# Patient Record
Sex: FEMALE | Race: WHITE | Employment: UNEMPLOYED | URBAN - METROPOLITAN AREA
[De-identification: names, ages, dates, MRNs, and addresses within clinical notes are randomized per-mention and may not be internally consistent; named-entity substitution may affect disease eponyms.]

---

## 2018-10-22 ENCOUNTER — APPOINTMENT (OUTPATIENT)
Dept: CT IMAGING | Age: 33
End: 2018-10-22
Payer: COMMERCIAL

## 2018-10-22 ENCOUNTER — HOSPITAL ENCOUNTER (EMERGENCY)
Age: 33
Discharge: HOME | End: 2018-10-22
Attending: EMERGENCY MEDICINE
Payer: COMMERCIAL

## 2018-10-22 VITALS
HEIGHT: 68 IN | BODY MASS INDEX: 27.28 KG/M2 | TEMPERATURE: 97.8 F | WEIGHT: 180 LBS | DIASTOLIC BLOOD PRESSURE: 88 MMHG | OXYGEN SATURATION: 97 % | HEART RATE: 78 BPM | SYSTOLIC BLOOD PRESSURE: 145 MMHG | RESPIRATION RATE: 20 BRPM

## 2018-10-22 DIAGNOSIS — S00.33XA HEMATOMA OF NASAL SEPTUM, INITIAL ENCOUNTER: ICD-10-CM

## 2018-10-22 DIAGNOSIS — S09.90XA CLOSED HEAD INJURY, INITIAL ENCOUNTER: Primary | ICD-10-CM

## 2018-10-22 PROCEDURE — 6370000000 HC RX 637 (ALT 250 FOR IP): Performed by: EMERGENCY MEDICINE

## 2018-10-22 PROCEDURE — 6370000000 HC RX 637 (ALT 250 FOR IP)

## 2018-10-22 PROCEDURE — 72125 CT NECK SPINE W/O DYE: CPT

## 2018-10-22 PROCEDURE — 99284 EMERGENCY DEPT VISIT MOD MDM: CPT

## 2018-10-22 PROCEDURE — 70486 CT MAXILLOFACIAL W/O DYE: CPT

## 2018-10-22 PROCEDURE — 70450 CT HEAD/BRAIN W/O DYE: CPT

## 2018-10-22 RX ORDER — OXYCODONE HYDROCHLORIDE AND ACETAMINOPHEN 5; 325 MG/1; MG/1
1 TABLET ORAL ONCE
Status: COMPLETED | OUTPATIENT
Start: 2018-10-22 | End: 2018-10-22

## 2018-10-22 RX ORDER — HYDROCODONE BITARTRATE AND ACETAMINOPHEN 5; 325 MG/1; MG/1
1 TABLET ORAL ONCE
Status: COMPLETED | OUTPATIENT
Start: 2018-10-22 | End: 2018-10-22

## 2018-10-22 RX ORDER — HYDROCODONE BITARTRATE AND ACETAMINOPHEN 5; 325 MG/1; MG/1
TABLET ORAL
Status: COMPLETED
Start: 2018-10-22 | End: 2018-10-22

## 2018-10-22 RX ORDER — HYDROCODONE BITARTRATE AND ACETAMINOPHEN 5; 325 MG/1; MG/1
1 TABLET ORAL EVERY 6 HOURS PRN
Qty: 8 TABLET | Refills: 0 | Status: SHIPPED | OUTPATIENT
Start: 2018-10-22 | End: 2018-10-24

## 2018-10-22 RX ADMIN — HYDROCODONE BITARTRATE AND ACETAMINOPHEN 1 TABLET: 5; 325 TABLET ORAL at 21:00

## 2018-10-22 RX ADMIN — OXYCODONE HYDROCHLORIDE AND ACETAMINOPHEN 1 TABLET: 5; 325 TABLET ORAL at 18:14

## 2018-10-22 ASSESSMENT — PAIN SCALES - GENERAL
PAINLEVEL_OUTOF10: 8
PAINLEVEL_OUTOF10: 8

## 2018-10-22 ASSESSMENT — PAIN DESCRIPTION - PAIN TYPE: TYPE: ACUTE PAIN

## 2018-10-22 ASSESSMENT — PAIN DESCRIPTION - LOCATION: LOCATION: FACE;HEAD

## 2018-10-22 NOTE — ED PROVIDER NOTES
Straightening of the normal cervical lordosis which may be   secondary to patient positioning versus muscle spasm. Recommend   clinical correlation. CT FACIAL BONES WO CONTRAST   Final Result      1. No definite evidence of acute facial fracture or related   complication. 2. Fluid levels and mucosal thickening in the maxillary sinuses and   less prominent mucosal thickening in ethmoid air cells may be related   to acute sinusitis. 3. There is a left-sided nasal septal spur in the mid nasal cavity. 4. The patient is edentulous      CT Head WO Contrast   Final Result   Negative for evidence of acute intracranial pathology or calvarial   fracture. There is soft tissue swelling over the nasal bones and midline for   head externally, and there is mucosal thickening or dependent fluid in   the maxillary sinuses. ------------------------- NURSING NOTES AND VITALS REVIEWED ---------------------------   The nursing notes within the ED encounter and vital signs as below have been reviewed. BP (!) 145/88   Pulse 78   Temp 97.8 °F (36.6 °C)   Resp 20   Ht 5' 8\" (1.727 m)   Wt 180 lb (81.6 kg)   SpO2 97%   BMI 27.37 kg/m²   Oxygen Saturation Interpretation: Normal      ---------------------------------------------------PHYSICAL EXAM--------------------------------------      Constitutional/General: Alert and oriented x3, well appearing, non toxic in NAD  Head: Normocephalic and atraumatic. No midface instability. Eyes: PERRL, EOMI  Mouth: Oropharynx clear, handling secretions, no trismus  Nose:  No nasal septal hematoma. Neck: Supple, midline ttp. Collar in place. Pulmonary: Lungs clear to auscultation bilaterally, no wheezes, rales, or rhonchi. Not in respiratory distress  Cardiovascular:  Regular rate and rhythm, no murmurs, gallops, or rubs. 2+ distal pulses  Abdomen: Soft, non tender, non distended,   Extremities: Moves all extremities x 4. Warm and well perfused.  Nontender  Skin: warm

## 2019-06-17 ENCOUNTER — APPOINTMENT (OUTPATIENT)
Dept: CT IMAGING | Age: 34
End: 2019-06-17
Payer: COMMERCIAL

## 2019-06-17 ENCOUNTER — HOSPITAL ENCOUNTER (EMERGENCY)
Age: 34
Discharge: HOME | End: 2019-06-18
Attending: EMERGENCY MEDICINE
Payer: COMMERCIAL

## 2019-06-17 DIAGNOSIS — K52.9 GASTROENTERITIS: Primary | ICD-10-CM

## 2019-06-17 DIAGNOSIS — K76.0 FATTY LIVER: ICD-10-CM

## 2019-06-17 DIAGNOSIS — K80.20 CALCULUS OF GALLBLADDER WITHOUT CHOLECYSTITIS WITHOUT OBSTRUCTION: ICD-10-CM

## 2019-06-17 LAB
ALBUMIN SERPL-MCNC: 4.5 G/DL (ref 3.5–5.2)
ALP BLD-CCNC: 73 U/L (ref 35–104)
ALT SERPL-CCNC: 13 U/L (ref 0–32)
ANION GAP SERPL CALCULATED.3IONS-SCNC: 15 MMOL/L (ref 7–16)
AST SERPL-CCNC: 11 U/L (ref 0–31)
BACTERIA: ABNORMAL /HPF
BASOPHILS ABSOLUTE: 0.04 E9/L (ref 0–0.2)
BASOPHILS RELATIVE PERCENT: 0.2 % (ref 0–2)
BILIRUB SERPL-MCNC: 0.4 MG/DL (ref 0–1.2)
BILIRUBIN URINE: ABNORMAL
BLOOD, URINE: ABNORMAL
BUN BLDV-MCNC: 12 MG/DL (ref 6–20)
CALCIUM SERPL-MCNC: 9.6 MG/DL (ref 8.6–10.2)
CHLORIDE BLD-SCNC: 111 MMOL/L (ref 98–107)
CLARITY: ABNORMAL
CO2: 17 MMOL/L (ref 22–29)
COLOR: ABNORMAL
CREAT SERPL-MCNC: 0.7 MG/DL (ref 0.5–1)
EOSINOPHILS ABSOLUTE: 0 E9/L (ref 0.05–0.5)
EOSINOPHILS RELATIVE PERCENT: 0 % (ref 0–6)
GFR AFRICAN AMERICAN: >60
GFR NON-AFRICAN AMERICAN: >60 ML/MIN/1.73
GLUCOSE BLD-MCNC: 116 MG/DL (ref 74–99)
GLUCOSE URINE: NEGATIVE MG/DL
HCG, URINE, POC: NEGATIVE
HCT VFR BLD CALC: 45 % (ref 34–48)
HEMOGLOBIN: 15.1 G/DL (ref 11.5–15.5)
IMMATURE GRANULOCYTES #: 0.13 E9/L
IMMATURE GRANULOCYTES %: 0.8 % (ref 0–5)
KETONES, URINE: 40 MG/DL
LACTIC ACID: 2.8 MMOL/L (ref 0.5–2.2)
LEUKOCYTE ESTERASE, URINE: NEGATIVE
LYMPHOCYTES ABSOLUTE: 1.54 E9/L (ref 1.5–4)
LYMPHOCYTES RELATIVE PERCENT: 9 % (ref 20–42)
Lab: NORMAL
MCH RBC QN AUTO: 30.3 PG (ref 26–35)
MCHC RBC AUTO-ENTMCNC: 33.6 % (ref 32–34.5)
MCV RBC AUTO: 90.4 FL (ref 80–99.9)
MONOCYTES ABSOLUTE: 0.73 E9/L (ref 0.1–0.95)
MONOCYTES RELATIVE PERCENT: 4.3 % (ref 2–12)
MUCUS: PRESENT
NEGATIVE QC PASS/FAIL: NORMAL
NEUTROPHILS ABSOLUTE: 14.68 E9/L (ref 1.8–7.3)
NEUTROPHILS RELATIVE PERCENT: 85.7 % (ref 43–80)
NITRITE, URINE: NEGATIVE
PDW BLD-RTO: 13.5 FL (ref 11.5–15)
PH UA: 8 (ref 5–9)
PLATELET # BLD: 238 E9/L (ref 130–450)
PMV BLD AUTO: 11.3 FL (ref 7–12)
POSITIVE QC PASS/FAIL: NORMAL
POTASSIUM SERPL-SCNC: 3.8 MMOL/L (ref 3.5–5)
PROTEIN UA: 100 MG/DL
RBC # BLD: 4.98 E12/L (ref 3.5–5.5)
RBC UA: ABNORMAL /HPF (ref 0–2)
REASON FOR REJECTION: NORMAL
REJECTED TEST: NORMAL
SODIUM BLD-SCNC: 143 MMOL/L (ref 132–146)
SPECIFIC GRAVITY UA: 1.01 (ref 1–1.03)
TOTAL PROTEIN: 7.2 G/DL (ref 6.4–8.3)
UROBILINOGEN, URINE: 2 E.U./DL
WBC # BLD: 17.1 E9/L (ref 4.5–11.5)
WBC UA: ABNORMAL /HPF (ref 0–5)

## 2019-06-17 PROCEDURE — 96372 THER/PROPH/DIAG INJ SC/IM: CPT

## 2019-06-17 PROCEDURE — 85025 COMPLETE CBC W/AUTO DIFF WBC: CPT

## 2019-06-17 PROCEDURE — 99284 EMERGENCY DEPT VISIT MOD MDM: CPT

## 2019-06-17 PROCEDURE — 6360000002 HC RX W HCPCS: Performed by: NURSE PRACTITIONER

## 2019-06-17 PROCEDURE — 93005 ELECTROCARDIOGRAM TRACING: CPT | Performed by: EMERGENCY MEDICINE

## 2019-06-17 PROCEDURE — 83605 ASSAY OF LACTIC ACID: CPT

## 2019-06-17 PROCEDURE — 96375 TX/PRO/DX INJ NEW DRUG ADDON: CPT

## 2019-06-17 PROCEDURE — 81001 URINALYSIS AUTO W/SCOPE: CPT

## 2019-06-17 PROCEDURE — 2580000003 HC RX 258: Performed by: RADIOLOGY

## 2019-06-17 PROCEDURE — 2580000003 HC RX 258: Performed by: NURSE PRACTITIONER

## 2019-06-17 PROCEDURE — 36415 COLL VENOUS BLD VENIPUNCTURE: CPT

## 2019-06-17 PROCEDURE — 74177 CT ABD & PELVIS W/CONTRAST: CPT

## 2019-06-17 PROCEDURE — 6360000004 HC RX CONTRAST MEDICATION: Performed by: RADIOLOGY

## 2019-06-17 PROCEDURE — 96374 THER/PROPH/DIAG INJ IV PUSH: CPT

## 2019-06-17 PROCEDURE — 80053 COMPREHEN METABOLIC PANEL: CPT

## 2019-06-17 RX ORDER — ONDANSETRON 2 MG/ML
4 INJECTION INTRAMUSCULAR; INTRAVENOUS ONCE
Status: COMPLETED | OUTPATIENT
Start: 2019-06-17 | End: 2019-06-17

## 2019-06-17 RX ORDER — 0.9 % SODIUM CHLORIDE 0.9 %
1000 INTRAVENOUS SOLUTION INTRAVENOUS ONCE
Status: COMPLETED | OUTPATIENT
Start: 2019-06-17 | End: 2019-06-18

## 2019-06-17 RX ORDER — PROMETHAZINE HYDROCHLORIDE 25 MG/ML
12.5 INJECTION, SOLUTION INTRAMUSCULAR; INTRAVENOUS ONCE
Status: COMPLETED | OUTPATIENT
Start: 2019-06-17 | End: 2019-06-17

## 2019-06-17 RX ORDER — SODIUM CHLORIDE 0.9 % (FLUSH) 0.9 %
10 SYRINGE (ML) INJECTION ONCE
Status: COMPLETED | OUTPATIENT
Start: 2019-06-17 | End: 2019-06-17

## 2019-06-17 RX ORDER — MORPHINE SULFATE 2 MG/ML
2 INJECTION, SOLUTION INTRAMUSCULAR; INTRAVENOUS ONCE
Status: COMPLETED | OUTPATIENT
Start: 2019-06-17 | End: 2019-06-17

## 2019-06-17 RX ORDER — KETOROLAC TROMETHAMINE 30 MG/ML
15 INJECTION, SOLUTION INTRAMUSCULAR; INTRAVENOUS ONCE
Status: COMPLETED | OUTPATIENT
Start: 2019-06-17 | End: 2019-06-17

## 2019-06-17 RX ORDER — 0.9 % SODIUM CHLORIDE 0.9 %
1000 INTRAVENOUS SOLUTION INTRAVENOUS ONCE
Status: COMPLETED | OUTPATIENT
Start: 2019-06-17 | End: 2019-06-17

## 2019-06-17 RX ORDER — PROCHLORPERAZINE EDISYLATE 5 MG/ML
10 INJECTION INTRAMUSCULAR; INTRAVENOUS ONCE
Status: COMPLETED | OUTPATIENT
Start: 2019-06-17 | End: 2019-06-17

## 2019-06-17 RX ADMIN — SODIUM CHLORIDE 1000 ML: 9 INJECTION, SOLUTION INTRAVENOUS at 23:00

## 2019-06-17 RX ADMIN — Medication 10 ML: at 21:52

## 2019-06-17 RX ADMIN — PROCHLORPERAZINE EDISYLATE 10 MG: 5 INJECTION INTRAMUSCULAR; INTRAVENOUS at 23:00

## 2019-06-17 RX ADMIN — ONDANSETRON 4 MG: 2 INJECTION INTRAMUSCULAR; INTRAVENOUS at 19:29

## 2019-06-17 RX ADMIN — IOPAMIDOL 110 ML: 755 INJECTION, SOLUTION INTRAVENOUS at 21:52

## 2019-06-17 RX ADMIN — KETOROLAC TROMETHAMINE 15 MG: 30 INJECTION, SOLUTION INTRAMUSCULAR at 19:29

## 2019-06-17 RX ADMIN — MORPHINE SULFATE 2 MG: 2 INJECTION, SOLUTION INTRAMUSCULAR; INTRAVENOUS at 20:16

## 2019-06-17 RX ADMIN — SODIUM CHLORIDE 1000 ML: 9 INJECTION, SOLUTION INTRAVENOUS at 19:29

## 2019-06-17 RX ADMIN — PROMETHAZINE HYDROCHLORIDE 12.5 MG: 25 INJECTION INTRAMUSCULAR; INTRAVENOUS at 21:10

## 2019-06-17 ASSESSMENT — PAIN SCALES - GENERAL
PAINLEVEL_OUTOF10: 8
PAINLEVEL_OUTOF10: 10
PAINLEVEL_OUTOF10: 8

## 2019-06-17 ASSESSMENT — PAIN DESCRIPTION - PAIN TYPE: TYPE: ACUTE PAIN

## 2019-06-17 ASSESSMENT — PAIN DESCRIPTION - LOCATION: LOCATION: ABDOMEN

## 2019-06-17 NOTE — ED PROVIDER NOTES
Color, UA SILVESTRE (A) Straw/Yellow    Clarity, UA SL CLOUDY Clear    Glucose, Ur Negative Negative mg/dL    Bilirubin Urine SMALL (A) Negative    Ketones, Urine 40 (A) Negative mg/dL    Specific Gravity, UA 1.015 1.005 - 1.030    Blood, Urine LARGE (A) Negative    pH, UA 8.0 5.0 - 9.0    Protein,  (A) Negative mg/dL    Urobilinogen, Urine 2.0 (A) <2.0 E.U./dL    Nitrite, Urine Negative Negative    Leukocyte Esterase, Urine Negative Negative   Lactic Acid, Plasma   Result Value Ref Range    Lactic Acid 2.8 (H) 0.5 - 2.2 mmol/L   Microscopic Urinalysis   Result Value Ref Range    Mucus, UA Present     WBC, UA 1-3 0 - 5 /HPF    RBC, UA 2-5 0 - 2 /HPF    Bacteria, UA MODERATE (A) /HPF   SPECIMEN REJECTION   Result Value Ref Range    Rejected Test CMP     Reason for Rejection see below    Comprehensive Metabolic Panel   Result Value Ref Range    Sodium 143 132 - 146 mmol/L    Potassium 3.8 3.5 - 5.0 mmol/L    Chloride 111 (H) 98 - 107 mmol/L    CO2 17 (L) 22 - 29 mmol/L    Anion Gap 15 7 - 16 mmol/L    Glucose 116 (H) 74 - 99 mg/dL    BUN 12 6 - 20 mg/dL    CREATININE 0.7 0.5 - 1.0 mg/dL    GFR Non-African American >60 >=60 mL/min/1.73    GFR African American >60     Calcium 9.6 8.6 - 10.2 mg/dL    Total Protein 7.2 6.4 - 8.3 g/dL    Alb 4.5 3.5 - 5.2 g/dL    Total Bilirubin 0.4 0.0 - 1.2 mg/dL    Alkaline Phosphatase 73 35 - 104 U/L    ALT 13 0 - 32 U/L    AST 11 0 - 31 U/L   POC Pregnancy Urine   Result Value Ref Range    HCG, Urine, POC Negative Negative    Lot Number [id number]     Positive QC Pass/Fail Pass     Negative QC Pass/Fail Pass    EKG 12 Lead   Result Value Ref Range    Ventricular Rate 51 BPM    Atrial Rate 51 BPM    P-R Interval 138 ms    QRS Duration 98 ms    Q-T Interval 498 ms    QTc Calculation (Bazett) 458 ms    P Axis 55 degrees    R Axis 80 degrees    T Axis 90 degrees     Imaging: All Radiology results interpreted by Radiologist unless otherwise noted.   CT ABDOMEN PELVIS W IV CONTRAST Additional Contrast? None   Final Result   Mild fatty liver. Cholelithiasis. See above. ED Course / Medical Decision Making     Medications   [facility]ansetron [facility]) injection 4 mg (4 mg Intravenous Given 6/17/19 1929)   ketorolac (TORADOL) injection 15 mg (15 mg Intravenous Given 6/17/19 1929)   0.9 % sodium chloride bolus (0 mLs Intravenous Stopped 6/17/19 2300)   morphine (PF) injection 2 mg (2 mg Intravenous Given 6/17/19 2016)   iopamidol (ISOVUE-370) 76 % injection 110 mL (110 mLs Intravenous Given 6/17/19 2152)   sodium chloride flush 0.9 % injection 10 mL (10 mLs Intravenous Given 6/17/19 2152)   promethazine (PHENERGAN) injection 12.5 mg (12.5 mg Intramuscular Given 6/17/19 2110)   0.9 % sodium chloride bolus (0 mLs Intravenous Stopped 6/18/19 0127)   prochlorperazine (COMPAZINE) injection 10 mg (10 mg Intravenous Given 6/17/19 2300)     ED Course as of Jun 18 1937   Mon Jun 17, 2019   2239 Patient appears uncomfortable but nontoxic. Mild diffuse abdominal tenderness but no rebound or guarding. No focal tenderness. [JA]   Tue Jun 18, 2019   0103 On re-evaluation, patient has benign abdomen without focal tenderness. She is now tolerating po. leukocytosis and mild lactic acidosis likely reactive from gastroenteritis. Discussed incidental findings of gallstones and fatty liver. Advised outpatient follow-up. Patient has no right upper quadrant tenderness to suggest acute cholecystitis. LFTs normal.  Will be treated symptomatically with Zofran. ED return precautions discussed. [JA]      ED Course User Index  [JA] Esther Lucas MD     Re-examination:  6/17/19       Time:     Patients symptoms are improving. Consults:   None    Procedures:   none    MDM:   Patient is a 36year old female who came to the ED with complaints of lower abdominal pain, and emesis since this am. Her WBC were 17.1, and lactic acid was 2.8.  The CT of her abdomen and pelvis showed a mildly fatty

## 2019-06-18 VITALS
TEMPERATURE: 97.2 F | HEIGHT: 65 IN | RESPIRATION RATE: 16 BRPM | OXYGEN SATURATION: 100 % | DIASTOLIC BLOOD PRESSURE: 80 MMHG | BODY MASS INDEX: 29.99 KG/M2 | WEIGHT: 180 LBS | HEART RATE: 70 BPM | SYSTOLIC BLOOD PRESSURE: 128 MMHG

## 2019-06-18 LAB
EKG ATRIAL RATE: 51 BPM
EKG P AXIS: 55 DEGREES
EKG P-R INTERVAL: 138 MS
EKG Q-T INTERVAL: 498 MS
EKG QRS DURATION: 98 MS
EKG QTC CALCULATION (BAZETT): 458 MS
EKG R AXIS: 80 DEGREES
EKG T AXIS: 90 DEGREES
EKG VENTRICULAR RATE: 51 BPM

## 2019-06-18 PROCEDURE — 93010 ELECTROCARDIOGRAM REPORT: CPT | Performed by: INTERNAL MEDICINE

## 2019-06-18 RX ORDER — ONDANSETRON 4 MG/1
4 TABLET, ORALLY DISINTEGRATING ORAL EVERY 8 HOURS PRN
Qty: 12 TABLET | Refills: 0 | Status: SHIPPED | OUTPATIENT
Start: 2019-06-18

## 2019-06-18 NOTE — ED NOTES
Pt alert/oriented x3. resp easy/nonlabored. skin warm/dry. Pt given discharge instructions. Pt verbalized understanding understanding of discharge instruciotns.       [id number] [id number], RN  06/18/19 7733

## 2019-08-16 ENCOUNTER — HOSPITAL ENCOUNTER (EMERGENCY)
Age: 34
Discharge: HOME | End: 2019-08-16
Payer: COMMERCIAL

## 2019-08-16 VITALS
OXYGEN SATURATION: 97 % | TEMPERATURE: 97 F | RESPIRATION RATE: 16 BRPM | HEART RATE: 87 BPM | DIASTOLIC BLOOD PRESSURE: 91 MMHG | SYSTOLIC BLOOD PRESSURE: 142 MMHG

## 2019-08-16 DIAGNOSIS — B07.0 PLANTAR WART: Primary | ICD-10-CM

## 2019-08-16 PROCEDURE — 99282 EMERGENCY DEPT VISIT SF MDM: CPT

## 2019-08-16 RX ORDER — IBUPROFEN 800 MG/1
800 TABLET ORAL EVERY 8 HOURS PRN
Qty: 21 TABLET | Refills: 0 | Status: SHIPPED | OUTPATIENT
Start: 2019-08-16

## 2019-08-16 RX ORDER — CEPHALEXIN 500 MG/1
500 CAPSULE ORAL 3 TIMES DAILY
Qty: 21 CAPSULE | Refills: 0 | Status: SHIPPED | OUTPATIENT
Start: 2019-08-16 | End: 2019-08-23

## 2019-08-19 NOTE — ED PROVIDER NOTES
Electronically signed by NAY Maddox CNP   DD: 8/19/19  **This report was transcribed using voice recognition software. Every effort was made to ensure accuracy; however, inadvertent computerized transcription errors may be present.   END OF ED PROVIDER NOTE     ANY Casillas CNP  08/19/19 [id number]

## 2019-09-03 ENCOUNTER — HOSPITAL ENCOUNTER (EMERGENCY)
Age: 34
Discharge: LEFT BEFORE DISCHARGE | End: 2019-09-03
Payer: COMMERCIAL

## 2019-09-03 VITALS — HEART RATE: 80 BPM | OXYGEN SATURATION: 94 %

## 2021-04-24 ENCOUNTER — APPOINTMENT (OUTPATIENT)
Dept: CT IMAGING | Age: 36
End: 2021-04-24
Payer: COMMERCIAL

## 2021-04-24 ENCOUNTER — HOSPITAL ENCOUNTER (EMERGENCY)
Age: 36
Discharge: LEFT BEFORE DISCHARGE | End: 2021-04-24
Attending: EMERGENCY MEDICINE
Payer: COMMERCIAL

## 2021-04-24 ENCOUNTER — APPOINTMENT (OUTPATIENT)
Dept: ULTRASOUND IMAGING | Age: 36
End: 2021-04-24
Payer: COMMERCIAL

## 2021-04-24 VITALS
OXYGEN SATURATION: 100 % | SYSTOLIC BLOOD PRESSURE: 144 MMHG | HEART RATE: 62 BPM | TEMPERATURE: 96.2 F | DIASTOLIC BLOOD PRESSURE: 91 MMHG | BODY MASS INDEX: 29.33 KG/M2 | RESPIRATION RATE: 16 BRPM | HEIGHT: 69 IN | WEIGHT: 198 LBS

## 2021-04-24 DIAGNOSIS — R10.9 ABDOMINAL PAIN, UNSPECIFIED ABDOMINAL LOCATION: Primary | ICD-10-CM

## 2021-04-24 LAB
ALBUMIN SERPL-MCNC: 4.6 G/DL (ref 3.5–5.2)
ALP BLD-CCNC: 73 U/L (ref 35–104)
ALT SERPL-CCNC: 19 U/L (ref 0–32)
ANION GAP SERPL CALCULATED.3IONS-SCNC: 14 MMOL/L (ref 7–16)
AST SERPL-CCNC: 14 U/L (ref 0–31)
BACTERIA: ABNORMAL /HPF
BASOPHILS ABSOLUTE: 0.04 E9/L (ref 0–0.2)
BASOPHILS RELATIVE PERCENT: 0.2 % (ref 0–2)
BILIRUB SERPL-MCNC: 0.4 MG/DL (ref 0–1.2)
BILIRUBIN URINE: NEGATIVE
BLOOD, URINE: ABNORMAL
BUN BLDV-MCNC: 14 MG/DL (ref 6–20)
CALCIUM SERPL-MCNC: 10 MG/DL (ref 8.6–10.2)
CHLORIDE BLD-SCNC: 102 MMOL/L (ref 98–107)
CLARITY: ABNORMAL
CO2: 21 MMOL/L (ref 22–29)
COLOR: YELLOW
CREAT SERPL-MCNC: 0.8 MG/DL (ref 0.5–1)
EOSINOPHILS ABSOLUTE: 0.02 E9/L (ref 0.05–0.5)
EOSINOPHILS RELATIVE PERCENT: 0.1 % (ref 0–6)
EPITHELIAL CELLS, UA: ABNORMAL /HPF
GFR AFRICAN AMERICAN: >60
GFR NON-AFRICAN AMERICAN: >60 ML/MIN/1.73
GLUCOSE BLD-MCNC: 132 MG/DL (ref 74–99)
GLUCOSE URINE: NEGATIVE MG/DL
HCG, URINE, POC: NEGATIVE
HCT VFR BLD CALC: 41.1 % (ref 34–48)
HEMOGLOBIN: 13.8 G/DL (ref 11.5–15.5)
IMMATURE GRANULOCYTES #: 0.09 E9/L
IMMATURE GRANULOCYTES %: 0.5 % (ref 0–5)
KETONES, URINE: 15 MG/DL
LACTIC ACID: 1.9 MMOL/L (ref 0.5–2.2)
LEUKOCYTE ESTERASE, URINE: ABNORMAL
LYMPHOCYTES ABSOLUTE: 1.32 E9/L (ref 1.5–4)
LYMPHOCYTES RELATIVE PERCENT: 7.5 % (ref 20–42)
Lab: NORMAL
MCH RBC QN AUTO: 30.2 PG (ref 26–35)
MCHC RBC AUTO-ENTMCNC: 33.6 % (ref 32–34.5)
MCV RBC AUTO: 89.9 FL (ref 80–99.9)
MONOCYTES ABSOLUTE: 0.49 E9/L (ref 0.1–0.95)
MONOCYTES RELATIVE PERCENT: 2.8 % (ref 2–12)
NEGATIVE QC PASS/FAIL: NORMAL
NEUTROPHILS ABSOLUTE: 15.73 E9/L (ref 1.8–7.3)
NEUTROPHILS RELATIVE PERCENT: 88.9 % (ref 43–80)
NITRITE, URINE: NEGATIVE
PDW BLD-RTO: 14 FL (ref 11.5–15)
PH UA: 6 (ref 5–9)
PLATELET # BLD: 217 E9/L (ref 130–450)
PMV BLD AUTO: 11 FL (ref 7–12)
POSITIVE QC PASS/FAIL: NORMAL
POTASSIUM SERPL-SCNC: 3.3 MMOL/L (ref 3.5–5)
PROTEIN UA: 30 MG/DL
RBC # BLD: 4.57 E12/L (ref 3.5–5.5)
RBC UA: ABNORMAL /HPF (ref 0–2)
REASON FOR REJECTION: NORMAL
REJECTED TEST: NORMAL
SODIUM BLD-SCNC: 137 MMOL/L (ref 132–146)
SPECIFIC GRAVITY UA: 1.02 (ref 1–1.03)
TOTAL PROTEIN: 7.5 G/DL (ref 6.4–8.3)
UROBILINOGEN, URINE: 0.2 E.U./DL
WBC # BLD: 17.7 E9/L (ref 4.5–11.5)
WBC UA: ABNORMAL /HPF (ref 0–5)

## 2021-04-24 PROCEDURE — 6360000002 HC RX W HCPCS: Performed by: EMERGENCY MEDICINE

## 2021-04-24 PROCEDURE — 80053 COMPREHEN METABOLIC PANEL: CPT

## 2021-04-24 PROCEDURE — 96375 TX/PRO/DX INJ NEW DRUG ADDON: CPT

## 2021-04-24 PROCEDURE — 2580000003 HC RX 258: Performed by: EMERGENCY MEDICINE

## 2021-04-24 PROCEDURE — 85025 COMPLETE CBC W/AUTO DIFF WBC: CPT

## 2021-04-24 PROCEDURE — 6360000004 HC RX CONTRAST MEDICATION: Performed by: RADIOLOGY

## 2021-04-24 PROCEDURE — 99284 EMERGENCY DEPT VISIT MOD MDM: CPT

## 2021-04-24 PROCEDURE — 76856 US EXAM PELVIC COMPLETE: CPT

## 2021-04-24 PROCEDURE — 96374 THER/PROPH/DIAG INJ IV PUSH: CPT

## 2021-04-24 PROCEDURE — 93975 VASCULAR STUDY: CPT

## 2021-04-24 PROCEDURE — 81001 URINALYSIS AUTO W/SCOPE: CPT

## 2021-04-24 PROCEDURE — 83605 ASSAY OF LACTIC ACID: CPT

## 2021-04-24 PROCEDURE — 36415 COLL VENOUS BLD VENIPUNCTURE: CPT

## 2021-04-24 PROCEDURE — 74177 CT ABD & PELVIS W/CONTRAST: CPT

## 2021-04-24 RX ORDER — ONDANSETRON 2 MG/ML
4 INJECTION INTRAMUSCULAR; INTRAVENOUS ONCE
Status: COMPLETED | OUTPATIENT
Start: 2021-04-24 | End: 2021-04-24

## 2021-04-24 RX ORDER — SODIUM CHLORIDE 9 MG/ML
INJECTION, SOLUTION INTRAVENOUS CONTINUOUS
Status: DISCONTINUED | OUTPATIENT
Start: 2021-04-24 | End: 2021-04-24 | Stop reason: HOSPADM

## 2021-04-24 RX ORDER — KETOROLAC TROMETHAMINE 30 MG/ML
15 INJECTION, SOLUTION INTRAMUSCULAR; INTRAVENOUS ONCE
Status: COMPLETED | OUTPATIENT
Start: 2021-04-24 | End: 2021-04-24

## 2021-04-24 RX ORDER — FENTANYL CITRATE 50 UG/ML
50 INJECTION, SOLUTION INTRAMUSCULAR; INTRAVENOUS ONCE
Status: COMPLETED | OUTPATIENT
Start: 2021-04-24 | End: 2021-04-24

## 2021-04-24 RX ORDER — MORPHINE SULFATE 4 MG/ML
4 INJECTION, SOLUTION INTRAMUSCULAR; INTRAVENOUS ONCE
Status: COMPLETED | OUTPATIENT
Start: 2021-04-24 | End: 2021-04-24

## 2021-04-24 RX ORDER — 0.9 % SODIUM CHLORIDE 0.9 %
1000 INTRAVENOUS SOLUTION INTRAVENOUS ONCE
Status: COMPLETED | OUTPATIENT
Start: 2021-04-24 | End: 2021-04-24

## 2021-04-24 RX ORDER — METOCLOPRAMIDE HYDROCHLORIDE 5 MG/ML
10 INJECTION INTRAMUSCULAR; INTRAVENOUS ONCE
Status: DISCONTINUED | OUTPATIENT
Start: 2021-04-24 | End: 2021-04-24 | Stop reason: HOSPADM

## 2021-04-24 RX ADMIN — IOPAMIDOL 90 ML: 755 INJECTION, SOLUTION INTRAVENOUS at 13:23

## 2021-04-24 RX ADMIN — ONDANSETRON 4 MG: 2 INJECTION INTRAMUSCULAR; INTRAVENOUS at 12:36

## 2021-04-24 RX ADMIN — FENTANYL CITRATE 50 MCG: 50 INJECTION, SOLUTION INTRAMUSCULAR; INTRAVENOUS at 12:36

## 2021-04-24 RX ADMIN — SODIUM CHLORIDE 1000 ML: 9 INJECTION, SOLUTION INTRAVENOUS at 10:51

## 2021-04-24 RX ADMIN — KETOROLAC TROMETHAMINE 15 MG: 30 INJECTION, SOLUTION INTRAMUSCULAR; INTRAVENOUS at 10:54

## 2021-04-24 RX ADMIN — MORPHINE SULFATE 4 MG: 4 INJECTION, SOLUTION INTRAMUSCULAR; INTRAVENOUS at 13:40

## 2021-04-24 ASSESSMENT — PAIN SCALES - GENERAL
PAINLEVEL_OUTOF10: 10
PAINLEVEL_OUTOF10: 8

## 2021-04-24 NOTE — ED PROVIDER NOTES
HPI:  21, Time: 12:25 PM EDT         Deanna Milligan is a 29 y.o. female presenting to the ED for abdominal cramping. Patient states she had severe abdominal cramps since last night. Came on suddenly, nothing makes it better or worse, suprapubic area, does not rating her. She says she is passing clots, but this is not abnormal for her. She says the pain is new, no new vaginal bleeding. She did started her period yesterday as well. She says she does have a history of of a tubal ligation. Denies any fever, chills, nausea, vomiting, change in bowel or bladder, neck pain or stiffness, lethargy, cough, sputum, or any other symptoms or complaints. Review of Systems:   A complete review of systems was performed and pertinent positives and negatives are stated within HPI, all other systems reviewed and are negative.          --------------------------------------------- PAST HISTORY ---------------------------------------------  Past Medical History:  has no past medical history on file. Past Surgical History:  has a past surgical history that includes  section and Tubal ligation. Social History:  reports that she has been smoking cigarettes. She has been smoking about 1.00 pack per day. She has never used smokeless tobacco. She reports that she does not drink alcohol or use drugs. Family History: family history is not on file. The patients home medications have been reviewed. Allergies: Patient has no known allergies.     -------------------------------------------------- RESULTS -------------------------------------------------  All laboratory and radiology results have been personally reviewed by myself   LABS:  Results for orders placed or performed during the hospital encounter of 21   CBC Auto Differential   Result Value Ref Range    WBC 17.7 (H) 4.5 - 11.5 E9/L    RBC 4.57 3.50 - 5.50 E12/L    Hemoglobin 13.8 11.5 - 15.5 g/dL    Hematocrit 41.1 34.0 - 48.0 %    MCV 89.9 80.0 - 99.9 fL    MCH 30.2 26.0 - 35.0 pg    MCHC 33.6 32.0 - 34.5 %    RDW 14.0 11.5 - 15.0 fL    Platelets 807 859 - 840 E9/L    MPV 11.0 7.0 - 12.0 fL    Neutrophils % 88.9 (H) 43.0 - 80.0 %    Immature Granulocytes % 0.5 0.0 - 5.0 %    Lymphocytes % 7.5 (L) 20.0 - 42.0 %    Monocytes % 2.8 2.0 - 12.0 %    Eosinophils % 0.1 0.0 - 6.0 %    Basophils % 0.2 0.0 - 2.0 %    Neutrophils Absolute 15.73 (H) 1.80 - 7.30 E9/L    Immature Granulocytes # 0.09 E9/L    Lymphocytes Absolute 1.32 (L) 1.50 - 4.00 E9/L    Monocytes Absolute 0.49 0.10 - 0.95 E9/L    Eosinophils Absolute 0.02 (L) 0.05 - 0.50 E9/L    Basophils Absolute 0.04 0.00 - 0.20 E9/L   Urinalysis   Result Value Ref Range    Color, UA Yellow Straw/Yellow    Clarity, UA SL CLOUDY Clear    Glucose, Ur Negative Negative mg/dL    Bilirubin Urine Negative Negative    Ketones, Urine 15 (A) Negative mg/dL    Specific Gravity, UA 1.025 1.005 - 1.030    Blood, Urine LARGE (A) Negative    pH, UA 6.0 5.0 - 9.0    Protein, UA 30 (A) Negative mg/dL    Urobilinogen, Urine 0.2 <2.0 E.U./dL    Nitrite, Urine Negative Negative    Leukocyte Esterase, Urine TRACE (A) Negative   Lactic Acid, Plasma   Result Value Ref Range    Lactic Acid 1.9 0.5 - 2.2 mmol/L   Microscopic Urinalysis   Result Value Ref Range    WBC, UA NONE 0 - 5 /HPF    RBC, UA 10-20 (A) 0 - 2 /HPF    Epithelial Cells, UA RARE /HPF    Bacteria, UA RARE (A) None Seen /HPF   Comprehensive metabolic panel   Result Value Ref Range    Sodium 137 132 - 146 mmol/L    Potassium 3.3 (L) 3.5 - 5.0 mmol/L    Chloride 102 98 - 107 mmol/L    CO2 21 (L) 22 - 29 mmol/L    Anion Gap 14 7 - 16 mmol/L    Glucose 132 (H) 74 - 99 mg/dL    BUN 14 6 - 20 mg/dL    CREATININE 0.8 0.5 - 1.0 mg/dL    GFR Non-African American >60 >=60 mL/min/1.73    GFR African American >60     Calcium 10.0 8.6 - 10.2 mg/dL    Total Protein 7.5 6.4 - 8.3 g/dL    Albumin 4.6 3.5 - 5.2 g/dL    Total Bilirubin 0.4 0.0 - 1.2 mg/dL    Alkaline Phosphatase 73 35 - 104 U/L    ALT 19 0 - 32 U/L    AST 14 0 - 31 U/L   SPECIMEN REJECTION   Result Value Ref Range    Rejected Test CMP     Reason for Rejection see below    POC Pregnancy Urine Qual   Result Value Ref Range    HCG, Urine, POC Negative Negative    Lot Number [id number]     Positive QC Pass/Fail Pass     Negative QC Pass/Fail Pass        RADIOLOGY:  Interpreted by Radiologist.  US PELVIS COMPLETE   Final Result   Unremarkable pelvic ultrasound. Nonvisualized right ovary. US DUP ABD PEL RETRO SCROT COMPLETE   Final Result   Nonvisualized right ovary. Normal Doppler flow signal to the left ovary with no evidence of left ovarian   torsion. CT ABDOMEN PELVIS W IV CONTRAST Additional Contrast? None    (Results Pending)       ------------------------- NURSING NOTES AND VITALS REVIEWED ---------------------------   The nursing notes within the ED encounter and vital signs as below have been reviewed. BP (!) 144/91   Pulse 62   Temp 96.2 °F (35.7 °C) (Infrared)   Resp 16   Ht 5' 9\" (1.753 m)   Wt 198 lb (89.8 kg)   LMP 04/24/2021 (Exact Date)   SpO2 100%   BMI 29.24 kg/m²   Oxygen Saturation Interpretation: Normal      ---------------------------------------------------PHYSICAL EXAM--------------------------------------      Constitutional/General: Alert and oriented x3, well appearing, non toxic in NAD  Head: Normocephalic and atraumatic  Eyes: PERRL, EOMI  Mouth: Oropharynx clear, handling secretions, no trismus  Neck: Supple, full ROM,   Pulmonary: Lungs clear to auscultation bilaterally, no wheezes, rales, or rhonchi. Not in respiratory distress  Cardiovascular:  Regular rate and rhythm, no murmurs, gallops, or rubs.  2+ distal pulses  Abdomen: Soft, with mild suprapubic tenderness, and left lower quadrant tenderness, no guarding or rebound, bowel sounds normal, no pain over the right lower quadrant, no pain of the right upper quadrant, no pain over McBurney's point, Rovsing sign negative,

## 2021-04-24 NOTE — ED NOTES
Bed: HB  Expected date:   Expected time:   Means of arrival:   Comments:  CHING Pierson, XANDER  04/24/21 [id number]

## 2022-01-01 ENCOUNTER — APPOINTMENT (OUTPATIENT)
Dept: GENERAL RADIOLOGY | Age: 37
DRG: 812 | End: 2022-01-01
Payer: COMMERCIAL

## 2022-01-01 ENCOUNTER — APPOINTMENT (OUTPATIENT)
Dept: CT IMAGING | Age: 37
DRG: 812 | End: 2022-01-01
Payer: COMMERCIAL

## 2022-01-01 ENCOUNTER — ANESTHESIA EVENT (OUTPATIENT)
Dept: OPERATING ROOM | Age: 37
DRG: 812 | End: 2022-01-01
Payer: COMMERCIAL

## 2022-01-01 ENCOUNTER — HOSPITAL ENCOUNTER (INPATIENT)
Age: 37
LOS: 7 days | DRG: 812 | End: 2022-05-19
Attending: EMERGENCY MEDICINE | Admitting: INTERNAL MEDICINE
Payer: COMMERCIAL

## 2022-01-01 ENCOUNTER — ANESTHESIA (OUTPATIENT)
Dept: OPERATING ROOM | Age: 37
DRG: 812 | End: 2022-01-01
Payer: COMMERCIAL

## 2022-01-01 ENCOUNTER — APPOINTMENT (OUTPATIENT)
Dept: MRI IMAGING | Age: 37
DRG: 812 | End: 2022-01-01
Payer: COMMERCIAL

## 2022-01-01 VITALS
RESPIRATION RATE: 6 BRPM | HEIGHT: 69 IN | BODY MASS INDEX: 24.73 KG/M2 | DIASTOLIC BLOOD PRESSURE: 73 MMHG | TEMPERATURE: 95.8 F | WEIGHT: 167 LBS | SYSTOLIC BLOOD PRESSURE: 150 MMHG | HEART RATE: 101 BPM | OXYGEN SATURATION: 98 %

## 2022-01-01 DIAGNOSIS — I46.9 CARDIAC ARREST (HCC): Primary | ICD-10-CM

## 2022-01-01 DIAGNOSIS — F19.10 SUBSTANCE ABUSE (HCC): ICD-10-CM

## 2022-01-01 LAB
AADO2: 100.3 MMHG
AADO2: 130.5 MMHG
AADO2: 136.9 MMHG
AADO2: 143.2 MMHG
AADO2: 147.2 MMHG
AADO2: 156.7 MMHG
AADO2: 156.7 MMHG
AADO2: 157.3 MMHG
AADO2: 163.5 MMHG
AADO2: 165.1 MMHG
AADO2: 165.9 MMHG
AADO2: 230.4 MMHG
AADO2: 240.1 MMHG
AADO2: 343 MMHG
AADO2: 375.9 MMHG
AADO2: 379.1 MMHG
AADO2: 40.6 MMHG
AADO2: 402.5 MMHG
AADO2: 43.7 MMHG
AADO2: 487.1 MMHG
AADO2: 489.3 MMHG
AADO2: 49 MMHG
AADO2: 55.7 MMHG
AADO2: 83.8 MMHG
AADO2: 96.4 MMHG
AADO2: 99.4 MMHG
ABO/RH: NORMAL
ACETAMINOPHEN LEVEL: <5 MCG/ML (ref 10–30)
ACETAMINOPHEN LEVEL: <5 MCG/ML (ref 10–30)
ACINETOBACTER CALCOAC BAUMANNII COMPLEX BY PCR: NOT DETECTED
ALBUMIN SERPL-MCNC: 2.8 G/DL (ref 3.5–5.2)
ALBUMIN SERPL-MCNC: 2.8 G/DL (ref 3.5–5.2)
ALBUMIN SERPL-MCNC: 2.9 G/DL (ref 3.5–5.2)
ALBUMIN SERPL-MCNC: 2.9 G/DL (ref 3.5–5.2)
ALBUMIN SERPL-MCNC: 3 G/DL (ref 3.5–5.2)
ALBUMIN SERPL-MCNC: 3.1 G/DL (ref 3.5–5.2)
ALBUMIN SERPL-MCNC: 3.2 G/DL (ref 3.5–5.2)
ALP BLD-CCNC: 69 U/L (ref 35–104)
ALP BLD-CCNC: 77 U/L (ref 35–104)
ALP BLD-CCNC: 79 U/L (ref 35–104)
ALP BLD-CCNC: 82 U/L (ref 35–104)
ALP BLD-CCNC: 84 U/L (ref 35–104)
ALP BLD-CCNC: 86 U/L (ref 35–104)
ALP BLD-CCNC: 88 U/L (ref 35–104)
ALP BLD-CCNC: 91 U/L (ref 35–104)
ALP BLD-CCNC: 92 U/L (ref 35–104)
ALP BLD-CCNC: 92 U/L (ref 35–104)
ALP BLD-CCNC: 94 U/L (ref 35–104)
ALT SERPL-CCNC: 104 U/L (ref 0–32)
ALT SERPL-CCNC: 31 U/L (ref 0–32)
ALT SERPL-CCNC: 31 U/L (ref 0–32)
ALT SERPL-CCNC: 32 U/L (ref 0–32)
ALT SERPL-CCNC: 37 U/L (ref 0–32)
ALT SERPL-CCNC: 41 U/L (ref 0–32)
ALT SERPL-CCNC: 41 U/L (ref 0–32)
ALT SERPL-CCNC: 49 U/L (ref 0–32)
ALT SERPL-CCNC: 50 U/L (ref 0–32)
ALT SERPL-CCNC: 51 U/L (ref 0–32)
ALT SERPL-CCNC: 52 U/L (ref 0–32)
AMORPHOUS: ABNORMAL
AMPHETAMINE SCREEN, URINE: NOT DETECTED
AMYLASE: 288 U/L (ref 20–100)
ANION GAP SERPL CALCULATED.3IONS-SCNC: 10 MMOL/L (ref 7–16)
ANION GAP SERPL CALCULATED.3IONS-SCNC: 11 MMOL/L (ref 7–16)
ANION GAP SERPL CALCULATED.3IONS-SCNC: 12 MMOL/L (ref 7–16)
ANION GAP SERPL CALCULATED.3IONS-SCNC: 13 MMOL/L (ref 7–16)
ANION GAP SERPL CALCULATED.3IONS-SCNC: 14 MMOL/L (ref 7–16)
ANION GAP SERPL CALCULATED.3IONS-SCNC: 15 MMOL/L (ref 7–16)
ANION GAP SERPL CALCULATED.3IONS-SCNC: 19 MMOL/L (ref 7–16)
ANION GAP SERPL CALCULATED.3IONS-SCNC: 20 MMOL/L (ref 7–16)
ANION GAP SERPL CALCULATED.3IONS-SCNC: 6 MMOL/L (ref 7–16)
ANION GAP SERPL CALCULATED.3IONS-SCNC: 7 MMOL/L (ref 7–16)
ANION GAP SERPL CALCULATED.3IONS-SCNC: 7 MMOL/L (ref 7–16)
ANION GAP SERPL CALCULATED.3IONS-SCNC: 8 MMOL/L (ref 7–16)
ANION GAP SERPL CALCULATED.3IONS-SCNC: 9 MMOL/L (ref 7–16)
ANION GAP: 12 MMOL/L (ref 7–16)
ANISOCYTOSIS: ABNORMAL
ANTIBODY SCREEN: NORMAL
APTT: 26.4 SEC (ref 24.5–35.1)
APTT: 26.4 SEC (ref 24.5–35.1)
APTT: 26.7 SEC (ref 24.5–35.1)
APTT: 26.8 SEC (ref 24.5–35.1)
APTT: 26.9 SEC (ref 24.5–35.1)
APTT: 26.9 SEC (ref 24.5–35.1)
APTT: 27 SEC (ref 24.5–35.1)
APTT: 27.2 SEC (ref 24.5–35.1)
APTT: 27.6 SEC (ref 24.5–35.1)
AST SERPL-CCNC: 11 U/L (ref 0–31)
AST SERPL-CCNC: 11 U/L (ref 0–31)
AST SERPL-CCNC: 117 U/L (ref 0–31)
AST SERPL-CCNC: 12 U/L (ref 0–31)
AST SERPL-CCNC: 13 U/L (ref 0–31)
AST SERPL-CCNC: 16 U/L (ref 0–31)
AST SERPL-CCNC: 17 U/L (ref 0–31)
AST SERPL-CCNC: 23 U/L (ref 0–31)
AST SERPL-CCNC: 30 U/L (ref 0–31)
AST SERPL-CCNC: 42 U/L (ref 0–31)
AST SERPL-CCNC: 44 U/L (ref 0–31)
B.E.: -0.1 MMOL/L (ref -3–3)
B.E.: -0.3 MMOL/L (ref -3–3)
B.E.: -0.4 MMOL/L (ref -3–3)
B.E.: -0.6 MMOL/L (ref -3–3)
B.E.: -0.6 MMOL/L (ref -3–3)
B.E.: -0.9 MMOL/L (ref -3–3)
B.E.: -0.9 MMOL/L (ref -3–3)
B.E.: -1.4 MMOL/L (ref -3–3)
B.E.: -1.9 MMOL/L (ref -3–3)
B.E.: -15.9 MMOL/L (ref -3–3)
B.E.: -2.1 MMOL/L (ref -3–3)
B.E.: -2.1 MMOL/L (ref -3–3)
B.E.: -2.5 MMOL/L (ref -3–3)
B.E.: -3.3 MMOL/L (ref -3–3)
B.E.: -3.6 MMOL/L (ref -3–3)
B.E.: -3.7 MMOL/L (ref -3–3)
B.E.: -4.1 MMOL/L (ref -3–3)
B.E.: 0 MMOL/L (ref -3–3)
B.E.: 0.2 MMOL/L (ref -3–3)
B.E.: 0.3 MMOL/L (ref -3–3)
B.E.: 0.8 MMOL/L (ref -3–3)
B.E.: 0.9 MMOL/L (ref -3–3)
B.E.: 1 MMOL/L (ref -3–3)
B.E.: 1.1 MMOL/L (ref -3–3)
B.E.: 1.3 MMOL/L (ref -3–3)
B.E.: 1.7 MMOL/L (ref -3–3)
B.E.: 2.4 MMOL/L (ref -3–3)
B.E.: 3.2 MMOL/L (ref -3–3)
BACTERIA: ABNORMAL /HPF
BACTERIA: NORMAL /HPF
BACTEROIDES FRAGILIS BY PCR: NOT DETECTED
BARBITURATE SCREEN URINE: NOT DETECTED
BASOPHILIC STIPPLING: ABNORMAL
BASOPHILS ABSOLUTE: 0 E9/L (ref 0–0.2)
BASOPHILS ABSOLUTE: 0.02 E9/L (ref 0–0.2)
BASOPHILS ABSOLUTE: 0.04 E9/L (ref 0–0.2)
BASOPHILS RELATIVE PERCENT: 0 % (ref 0–2)
BASOPHILS RELATIVE PERCENT: 0.1 % (ref 0–2)
BASOPHILS RELATIVE PERCENT: 0.3 % (ref 0–2)
BENZODIAZEPINE SCREEN, URINE: NOT DETECTED
BILIRUB SERPL-MCNC: 0.3 MG/DL (ref 0–1.2)
BILIRUB SERPL-MCNC: 0.5 MG/DL (ref 0–1.2)
BILIRUB SERPL-MCNC: 0.6 MG/DL (ref 0–1.2)
BILIRUB SERPL-MCNC: 0.7 MG/DL (ref 0–1.2)
BILIRUB SERPL-MCNC: 0.9 MG/DL (ref 0–1.2)
BILIRUB SERPL-MCNC: 1.1 MG/DL (ref 0–1.2)
BILIRUBIN DIRECT: 0.3 MG/DL (ref 0–0.3)
BILIRUBIN DIRECT: 0.4 MG/DL (ref 0–0.3)
BILIRUBIN DIRECT: 0.6 MG/DL (ref 0–0.3)
BILIRUBIN DIRECT: 0.9 MG/DL (ref 0–0.3)
BILIRUBIN URINE: ABNORMAL
BILIRUBIN URINE: NEGATIVE
BILIRUBIN, INDIRECT: 0.2 MG/DL (ref 0–1)
BILIRUBIN, INDIRECT: 0.3 MG/DL (ref 0–1)
BLOOD CULTURE, ROUTINE: ABNORMAL
BLOOD CULTURE, ROUTINE: NORMAL
BLOOD, URINE: ABNORMAL
BLOOD, URINE: NORMAL
BOTTLE TYPE: ABNORMAL
BUN BLDV-MCNC: 10 MG/DL (ref 6–20)
BUN BLDV-MCNC: 11 MG/DL (ref 6–20)
BUN BLDV-MCNC: 12 MG/DL (ref 6–20)
BUN BLDV-MCNC: 13 MG/DL (ref 6–20)
BUN BLDV-MCNC: 14 MG/DL (ref 6–20)
BUN BLDV-MCNC: 15 MG/DL (ref 6–20)
BUN BLDV-MCNC: 16 MG/DL (ref 6–20)
BUN BLDV-MCNC: 17 MG/DL (ref 6–20)
BUN BLDV-MCNC: 19 MG/DL (ref 6–20)
BUN BLDV-MCNC: 20 MG/DL (ref 6–20)
BUN BLDV-MCNC: 20 MG/DL (ref 6–20)
BUN BLDV-MCNC: 24 MG/DL (ref 6–20)
BUN BLDV-MCNC: 6 MG/DL (ref 6–20)
BUN BLDV-MCNC: 9 MG/DL (ref 6–20)
BURR CELLS: ABNORMAL
BURR CELLS: ABNORMAL
CALCIUM IONIZED: 1.18 MMOL/L (ref 1.15–1.33)
CALCIUM IONIZED: 1.18 MMOL/L (ref 1.15–1.33)
CALCIUM IONIZED: 1.21 MMOL/L (ref 1.15–1.33)
CALCIUM IONIZED: 1.22 MMOL/L (ref 1.15–1.33)
CALCIUM IONIZED: 1.24 MMOL/L (ref 1.15–1.33)
CALCIUM IONIZED: 1.28 MMOL/L (ref 1.15–1.33)
CALCIUM IONIZED: 1.28 MMOL/L (ref 1.15–1.33)
CALCIUM IONIZED: 1.29 MMOL/L (ref 1.15–1.33)
CALCIUM IONIZED: 1.3 MMOL/L (ref 1.15–1.33)
CALCIUM IONIZED: 1.33 MMOL/L (ref 1.15–1.33)
CALCIUM IONIZED: 1.34 MMOL/L (ref 1.15–1.33)
CALCIUM IONIZED: 1.38 MMOL/L (ref 1.15–1.33)
CALCIUM IONIZED: 1.4 MMOL/L (ref 1.15–1.33)
CALCIUM SERPL-MCNC: 7.3 MG/DL (ref 8.6–10.2)
CALCIUM SERPL-MCNC: 8.2 MG/DL (ref 8.6–10.2)
CALCIUM SERPL-MCNC: 8.3 MG/DL (ref 8.6–10.2)
CALCIUM SERPL-MCNC: 8.4 MG/DL (ref 8.6–10.2)
CALCIUM SERPL-MCNC: 8.5 MG/DL (ref 8.6–10.2)
CALCIUM SERPL-MCNC: 8.6 MG/DL (ref 8.6–10.2)
CALCIUM SERPL-MCNC: 8.8 MG/DL (ref 8.6–10.2)
CALCIUM SERPL-MCNC: 8.9 MG/DL (ref 8.6–10.2)
CALCIUM SERPL-MCNC: 9 MG/DL (ref 8.6–10.2)
CALCIUM SERPL-MCNC: 9.1 MG/DL (ref 8.6–10.2)
CALCIUM SERPL-MCNC: 9.1 MG/DL (ref 8.6–10.2)
CALCIUM SERPL-MCNC: 9.2 MG/DL (ref 8.6–10.2)
CALCIUM SERPL-MCNC: 9.3 MG/DL (ref 8.6–10.2)
CALCIUM SERPL-MCNC: 9.6 MG/DL (ref 8.6–10.2)
CALCIUM SERPL-MCNC: 9.7 MG/DL (ref 8.6–10.2)
CALCIUM SERPL-MCNC: 9.8 MG/DL (ref 8.6–10.2)
CANDIDA ALBICANS BY PCR: NOT DETECTED
CANDIDA AURIS BY PCR: NOT DETECTED
CANDIDA GLABRATA BY PCR: NOT DETECTED
CANDIDA KRUSEI BY PCR: NOT DETECTED
CANDIDA PARAPSILOSIS BY PCR: NOT DETECTED
CANDIDA TROPICALIS BY PCR: NOT DETECTED
CANNABINOID SCREEN URINE: POSITIVE
CARDIOPULMONARY BYPASS: NO
CHLORIDE BLD-SCNC: 108 MMOL/L (ref 98–107)
CHLORIDE BLD-SCNC: 108 MMOL/L (ref 98–107)
CHLORIDE BLD-SCNC: 109 MMOL/L (ref 98–107)
CHLORIDE BLD-SCNC: 109 MMOL/L (ref 98–107)
CHLORIDE BLD-SCNC: 111 MMOL/L (ref 98–107)
CHLORIDE BLD-SCNC: 112 MMOL/L (ref 98–107)
CHLORIDE BLD-SCNC: 113 MMOL/L (ref 98–107)
CHLORIDE BLD-SCNC: 113 MMOL/L (ref 98–107)
CHLORIDE BLD-SCNC: 114 MMOL/L (ref 98–107)
CHLORIDE BLD-SCNC: 115 MMOL/L (ref 98–107)
CHLORIDE BLD-SCNC: 117 MMOL/L (ref 98–107)
CHLORIDE BLD-SCNC: 118 MMOL/L (ref 98–107)
CHLORIDE BLD-SCNC: 119 MMOL/L (ref 98–107)
CHLORIDE BLD-SCNC: 119 MMOL/L (ref 98–107)
CHLORIDE BLD-SCNC: 120 MMOL/L (ref 98–107)
CHLORIDE BLD-SCNC: 121 MMOL/L (ref 98–107)
CHLORIDE BLD-SCNC: 122 MMOL/L (ref 98–107)
CHLORIDE BLD-SCNC: 123 MMOL/L (ref 98–107)
CHLORIDE BLD-SCNC: 123 MMOL/L (ref 98–107)
CHLORIDE BLD-SCNC: 124 MMOL/L (ref 98–107)
CHLORIDE BLD-SCNC: 126 MMOL/L (ref 98–107)
CHLORIDE BLD-SCNC: 126 MMOL/L (ref 98–107)
CHLORIDE BLD-SCNC: 128 MMOL/L (ref 98–107)
CHLORIDE BLD-SCNC: 129 MMOL/L (ref 98–107)
CHLORIDE BLD-SCNC: 135 MMOL/L (ref 98–107)
CHLORIDE URINE RANDOM: 25 MMOL/L
CHLORIDE URINE RANDOM: 90 MMOL/L
CLARITY: ABNORMAL
CLARITY: CLEAR
CLARITY: CLEAR
CO2: 17 MMOL/L (ref 22–29)
CO2: 18 MMOL/L (ref 22–29)
CO2: 19 MMOL/L (ref 22–29)
CO2: 20 MMOL/L (ref 22–29)
CO2: 20 MMOL/L (ref 22–29)
CO2: 21 MMOL/L (ref 22–29)
CO2: 22 MMOL/L (ref 22–29)
CO2: 23 MMOL/L (ref 22–29)
CO2: 24 MMOL/L (ref 22–29)
CO2: 25 MMOL/L (ref 22–29)
CO2: 26 MMOL/L (ref 22–29)
CO2: 27 MMOL/L (ref 22–29)
CO2: 28 MMOL/L (ref 22–29)
CO2: 29 MMOL/L (ref 22–29)
COCAINE METABOLITE SCREEN URINE: POSITIVE
COHB: 0.3 % (ref 0–1.5)
COHB: 0.5 % (ref 0–1.5)
COHB: 0.5 % (ref 0–1.5)
COHB: 0.6 % (ref 0–1.5)
COHB: 0.7 % (ref 0–1.5)
COHB: 0.7 % (ref 0–1.5)
COHB: 0.9 % (ref 0–1.5)
COHB: 5.1 % (ref 0–1.5)
COLOR: YELLOW
COMMENT: ABNORMAL
CREAT SERPL-MCNC: 0.8 MG/DL (ref 0.5–1)
CREAT SERPL-MCNC: 0.9 MG/DL (ref 0.5–1)
CREAT SERPL-MCNC: 1 MG/DL (ref 0.5–1)
CREAT SERPL-MCNC: 1.1 MG/DL (ref 0.5–1)
CREAT SERPL-MCNC: 1.2 MG/DL (ref 0.5–1)
CREAT SERPL-MCNC: 1.3 MG/DL (ref 0.5–1)
CREAT SERPL-MCNC: 1.4 MG/DL (ref 0.5–1)
CREATININE URINE: 84 MG/DL (ref 29–226)
CRITICAL: ABNORMAL
CRITICAL: NORMAL
CRYPTOCOCCUS NEOFORMANS/GATTII BY PCR: NOT DETECTED
CULTURE, BLOOD 2: NORMAL
CULTURE, BLOOD 2: NORMAL
CULTURE, RESPIRATORY: ABNORMAL
CULTURE, RESPIRATORY: ABNORMAL
DATE ANALYZED: ABNORMAL
DATE ANALYZED: NORMAL
DATE OF COLLECTION: ABNORMAL
DATE OF COLLECTION: NORMAL
DEVICE: ABNORMAL
EKG ATRIAL RATE: 101 BPM
EKG ATRIAL RATE: 120 BPM
EKG ATRIAL RATE: 86 BPM
EKG ATRIAL RATE: 99 BPM
EKG P AXIS: 11 DEGREES
EKG P AXIS: 14 DEGREES
EKG P AXIS: 55 DEGREES
EKG P AXIS: 6 DEGREES
EKG P-R INTERVAL: 112 MS
EKG P-R INTERVAL: 120 MS
EKG P-R INTERVAL: 136 MS
EKG P-R INTERVAL: 154 MS
EKG Q-T INTERVAL: 290 MS
EKG Q-T INTERVAL: 304 MS
EKG Q-T INTERVAL: 336 MS
EKG Q-T INTERVAL: 420 MS
EKG QRS DURATION: 102 MS
EKG QRS DURATION: 88 MS
EKG QRS DURATION: 92 MS
EKG QRS DURATION: 94 MS
EKG QTC CALCULATION (BAZETT): 363 MS
EKG QTC CALCULATION (BAZETT): 409 MS
EKG QTC CALCULATION (BAZETT): 431 MS
EKG QTC CALCULATION (BAZETT): 544 MS
EKG R AXIS: 70 DEGREES
EKG R AXIS: 70 DEGREES
EKG R AXIS: 76 DEGREES
EKG R AXIS: 90 DEGREES
EKG T AXIS: 124 DEGREES
EKG T AXIS: 139 DEGREES
EKG T AXIS: 61 DEGREES
EKG T AXIS: 67 DEGREES
EKG VENTRICULAR RATE: 101 BPM
EKG VENTRICULAR RATE: 120 BPM
EKG VENTRICULAR RATE: 86 BPM
EKG VENTRICULAR RATE: 99 BPM
ENTEROBACTER CLOACAE COMPLEX BY PCR: NOT DETECTED
ENTEROBACTERALES BY PCR: NOT DETECTED
ENTEROCOCCUS FAECALIS BY PCR: NOT DETECTED
ENTEROCOCCUS FAECIUM BY PCR: NOT DETECTED
EOSINOPHILS ABSOLUTE: 0 E9/L (ref 0.05–0.5)
EOSINOPHILS ABSOLUTE: 0.01 E9/L (ref 0.05–0.5)
EOSINOPHILS ABSOLUTE: 0.1 E9/L (ref 0.05–0.5)
EOSINOPHILS ABSOLUTE: 0.13 E9/L (ref 0.05–0.5)
EOSINOPHILS RELATIVE PERCENT: 0 % (ref 0–6)
EOSINOPHILS RELATIVE PERCENT: 0.1 % (ref 0–6)
EOSINOPHILS RELATIVE PERCENT: 0.1 % (ref 0–6)
EOSINOPHILS RELATIVE PERCENT: 0.8 % (ref 0–6)
EOSINOPHILS RELATIVE PERCENT: 0.9 % (ref 0–6)
ESCHERICHIA COLI BY PCR: NOT DETECTED
ETHANOL: <10 MG/DL (ref 0–0.08)
ETHANOL: <10 MG/DL (ref 0–0.08)
FENTANYL SCREEN, URINE: POSITIVE
FIO2: 100 %
FIO2: 30 %
FIO2: 40 %
FIO2: 50 %
FIO2: 50 %
GFR AFRICAN AMERICAN: 51
GFR AFRICAN AMERICAN: 56
GFR AFRICAN AMERICAN: >60
GFR NON-AFRICAN AMERICAN: 42 ML/MIN/1.73
GFR NON-AFRICAN AMERICAN: 46 ML/MIN/1.73
GFR NON-AFRICAN AMERICAN: 51 ML/MIN/1.73
GFR NON-AFRICAN AMERICAN: 56 ML/MIN/1.73
GFR NON-AFRICAN AMERICAN: >60 ML/MIN/1.73
GFR, ESTIMATED: >60 ML/MIN/1.73
GLUCOSE BLD-MCNC: 125 MG/DL (ref 74–99)
GLUCOSE BLD-MCNC: 133 MG/DL (ref 74–99)
GLUCOSE BLD-MCNC: 133 MG/DL (ref 74–99)
GLUCOSE BLD-MCNC: 137 MG/DL (ref 74–99)
GLUCOSE BLD-MCNC: 137 MG/DL (ref 74–99)
GLUCOSE BLD-MCNC: 138 MG/DL (ref 74–99)
GLUCOSE BLD-MCNC: 140 MG/DL (ref 74–99)
GLUCOSE BLD-MCNC: 141 MG/DL (ref 74–99)
GLUCOSE BLD-MCNC: 144 MG/DL (ref 74–99)
GLUCOSE BLD-MCNC: 145 MG/DL (ref 74–99)
GLUCOSE BLD-MCNC: 146 MG/DL (ref 74–99)
GLUCOSE BLD-MCNC: 150 MG/DL (ref 74–99)
GLUCOSE BLD-MCNC: 151 MG/DL (ref 74–99)
GLUCOSE BLD-MCNC: 151 MG/DL (ref 74–99)
GLUCOSE BLD-MCNC: 152 MG/DL (ref 74–99)
GLUCOSE BLD-MCNC: 154 MG/DL (ref 74–99)
GLUCOSE BLD-MCNC: 154 MG/DL (ref 74–99)
GLUCOSE BLD-MCNC: 156 MG/DL (ref 74–99)
GLUCOSE BLD-MCNC: 157 MG/DL (ref 74–99)
GLUCOSE BLD-MCNC: 158 MG/DL (ref 74–99)
GLUCOSE BLD-MCNC: 160 MG/DL (ref 74–99)
GLUCOSE BLD-MCNC: 161 MG/DL (ref 74–99)
GLUCOSE BLD-MCNC: 162 MG/DL (ref 74–99)
GLUCOSE BLD-MCNC: 163 MG/DL (ref 74–99)
GLUCOSE BLD-MCNC: 166 MG/DL (ref 74–99)
GLUCOSE BLD-MCNC: 168 MG/DL (ref 74–99)
GLUCOSE BLD-MCNC: 169 MG/DL (ref 74–99)
GLUCOSE BLD-MCNC: 172 MG/DL (ref 74–99)
GLUCOSE BLD-MCNC: 174 MG/DL (ref 74–99)
GLUCOSE BLD-MCNC: 177 MG/DL (ref 74–99)
GLUCOSE BLD-MCNC: 184 MG/DL (ref 74–99)
GLUCOSE BLD-MCNC: 186 MG/DL (ref 74–99)
GLUCOSE BLD-MCNC: 193 MG/DL (ref 74–99)
GLUCOSE BLD-MCNC: 194 MG/DL (ref 74–99)
GLUCOSE BLD-MCNC: 211 MG/DL (ref 74–99)
GLUCOSE BLD-MCNC: 213 MG/DL (ref 74–99)
GLUCOSE BLD-MCNC: 227 MG/DL (ref 74–99)
GLUCOSE BLD-MCNC: 269 MG/DL (ref 74–99)
GLUCOSE BLD-MCNC: 295 MG/DL (ref 74–99)
GLUCOSE URINE: 250 MG/DL
GLUCOSE URINE: NEGATIVE MG/DL
GRAM STAIN ORDERABLE: NORMAL
HAEMOPHILUS INFLUENZAE BY PCR: NOT DETECTED
HAV IGM SER IA-ACNC: NORMAL
HBA1C MFR BLD: 5.3 % (ref 4–5.6)
HBA1C MFR BLD: 5.7 % (ref 4–5.6)
HBV SURFACE AB TITR SER: NORMAL {TITER}
HCG QUALITATIVE: NEGATIVE
HCG, URINE, POC: NEGATIVE
HCO3: 14.7 MMOL/L (ref 22–26)
HCO3: 20.4 MMOL/L (ref 22–26)
HCO3: 21 MMOL/L (ref 22–26)
HCO3: 21.2 MMOL/L (ref 22–26)
HCO3: 21.4 MMOL/L (ref 22–26)
HCO3: 21.5 MMOL/L (ref 22–26)
HCO3: 21.6 MMOL/L (ref 22–26)
HCO3: 21.8 MMOL/L (ref 22–26)
HCO3: 22.7 MMOL/L (ref 22–26)
HCO3: 22.8 MMOL/L (ref 22–26)
HCO3: 22.9 MMOL/L (ref 22–26)
HCO3: 22.9 MMOL/L (ref 22–26)
HCO3: 23.1 MMOL/L (ref 22–26)
HCO3: 23.3 MMOL/L (ref 22–26)
HCO3: 23.7 MMOL/L (ref 22–26)
HCO3: 23.9 MMOL/L (ref 22–26)
HCO3: 24.3 MMOL/L (ref 22–26)
HCO3: 24.4 MMOL/L (ref 22–26)
HCO3: 24.5 MMOL/L (ref 22–26)
HCO3: 24.6 MMOL/L (ref 22–26)
HCO3: 24.6 MMOL/L (ref 22–26)
HCO3: 24.8 MMOL/L (ref 22–26)
HCO3: 24.8 MMOL/L (ref 22–26)
HCO3: 25.2 MMOL/L (ref 22–26)
HCO3: 26.3 MMOL/L (ref 22–26)
HCO3: 26.4 MMOL/L (ref 22–26)
HCO3: 27.2 MMOL/L (ref 22–26)
HCO3: 28.4 MMOL/L (ref 22–26)
HCT VFR BLD CALC: 23.6 % (ref 34–48)
HCT VFR BLD CALC: 24 % (ref 34–48)
HCT VFR BLD CALC: 24.6 % (ref 34–48)
HCT VFR BLD CALC: 25.9 % (ref 34–48)
HCT VFR BLD CALC: 26 % (ref 34–48)
HCT VFR BLD CALC: 26.3 % (ref 34–48)
HCT VFR BLD CALC: 26.7 % (ref 34–48)
HCT VFR BLD CALC: 27.7 % (ref 34–48)
HCT VFR BLD CALC: 28.6 % (ref 34–48)
HCT VFR BLD CALC: 33.8 % (ref 34–48)
HCT VFR BLD CALC: 33.9 % (ref 34–48)
HCT VFR BLD CALC: 34.5 % (ref 34–48)
HCT VFR BLD CALC: 34.7 % (ref 34–48)
HCT VFR BLD CALC: 38.5 % (ref 34–48)
HCT VFR BLD CALC: 38.7 % (ref 34–48)
HCT VFR BLD CALC: 40.5 % (ref 34–48)
HCT VFR BLD CALC: 41.9 % (ref 34–48)
HEMATOCRIT: 21 % (ref 34–48)
HEMOGLOBIN: 10.5 G/DL (ref 11.5–15.5)
HEMOGLOBIN: 10.8 G/DL (ref 11.5–15.5)
HEMOGLOBIN: 10.8 G/DL (ref 11.5–15.5)
HEMOGLOBIN: 10.9 G/DL (ref 11.5–15.5)
HEMOGLOBIN: 12.7 G/DL (ref 11.5–15.5)
HEMOGLOBIN: 12.8 G/DL (ref 11.5–15.5)
HEMOGLOBIN: 13.8 G/DL (ref 11.5–15.5)
HEMOGLOBIN: 13.9 G/DL (ref 11.5–15.5)
HEMOGLOBIN: 7.2 G/DL (ref 11.5–15.5)
HEMOGLOBIN: 7.8 G/DL (ref 11.5–15.5)
HEMOGLOBIN: 7.9 G/DL (ref 11.5–15.5)
HEMOGLOBIN: 8.1 G/DL (ref 11.5–15.5)
HEMOGLOBIN: 8.4 G/DL (ref 11.5–15.5)
HEMOGLOBIN: 8.4 G/DL (ref 11.5–15.5)
HEMOGLOBIN: 8.6 G/DL (ref 11.5–15.5)
HEMOGLOBIN: 8.7 G/DL (ref 11.5–15.5)
HEMOGLOBIN: 8.7 G/DL (ref 11.5–15.5)
HEMOGLOBIN: 9.1 G/DL (ref 11.5–15.5)
HEPATITIS B CORE IGM ANTIBODY: NORMAL
HEPATITIS B SURFACE ANTIGEN INTERPRETATION: NORMAL
HEPATITIS C ANTIBODY INTERPRETATION: NORMAL
HHB: 0.8 % (ref 0–5)
HHB: 0.9 % (ref 0–5)
HHB: 1 % (ref 0–5)
HHB: 1.1 % (ref 0–5)
HHB: 1.3 % (ref 0–5)
HHB: 1.3 % (ref 0–5)
HHB: 1.5 % (ref 0–5)
HHB: 1.7 % (ref 0–5)
HHB: 1.8 % (ref 0–5)
HHB: 2.1 % (ref 0–5)
HHB: 2.1 % (ref 0–5)
HHB: 2.2 % (ref 0–5)
HHB: 2.2 % (ref 0–5)
HHB: 2.5 % (ref 0–5)
HHB: 3.3 % (ref 0–5)
HHB: 4.3 % (ref 0–5)
HHB: 4.8 % (ref 0–5)
HHB: 4.9 % (ref 0–5)
HHB: 5.1 % (ref 0–5)
HHB: 5.5 % (ref 0–5)
HHB: 6.1 % (ref 0–5)
HHB: 6.3 % (ref 0–5)
HHB: 6.4 % (ref 0–5)
HHB: 7.2 % (ref 0–5)
HHB: 8 % (ref 0–5)
HIV-1 AND HIV-2 ANTIBODIES: NORMAL
HYPOCHROMIA: ABNORMAL
IMMATURE GRANULOCYTES #: 0.08 E9/L
IMMATURE GRANULOCYTES #: 0.08 E9/L
IMMATURE GRANULOCYTES #: 0.15 E9/L
IMMATURE GRANULOCYTES #: 0.18 E9/L
IMMATURE GRANULOCYTES #: 0.19 E9/L
IMMATURE GRANULOCYTES #: 0.23 E9/L
IMMATURE GRANULOCYTES %: 0.5 % (ref 0–5)
IMMATURE GRANULOCYTES %: 0.5 % (ref 0–5)
IMMATURE GRANULOCYTES %: 0.6 % (ref 0–5)
IMMATURE GRANULOCYTES %: 0.9 % (ref 0–5)
IMMATURE GRANULOCYTES %: 1.1 % (ref 0–5)
IMMATURE GRANULOCYTES %: 1.1 % (ref 0–5)
INR BLD: 1.1
INR BLD: 1.4
INR BLD: 1.5
INR BLD: 1.6
KETONES, URINE: 15 MG/DL
KETONES, URINE: 40 MG/DL
KETONES, URINE: ABNORMAL MG/DL
KETONES, URINE: NEGATIVE MG/DL
KETONES, URINE: NEGATIVE MG/DL
KLEBSIELLA AEROGENES BY PCR: NOT DETECTED
KLEBSIELLA OXYTOCA BY PCR: NOT DETECTED
KLEBSIELLA PNEUMONIAE GROUP BY PCR: NOT DETECTED
LAB: ABNORMAL
LAB: NORMAL
LACTIC ACID: 0.8 MMOL/L (ref 0.5–2.2)
LACTIC ACID: 0.8 MMOL/L (ref 0.5–2.2)
LACTIC ACID: 0.9 MMOL/L (ref 0.5–2.2)
LACTIC ACID: 1.1 MMOL/L (ref 0.5–2.2)
LACTIC ACID: 1.2 MMOL/L (ref 0.5–2.2)
LACTIC ACID: 1.3 MMOL/L (ref 0.5–2.2)
LACTIC ACID: 1.3 MMOL/L (ref 0.5–2.2)
LACTIC ACID: 1.4 MMOL/L (ref 0.5–2.2)
LACTIC ACID: 1.5 MMOL/L (ref 0.5–2.2)
LACTIC ACID: 1.6 MMOL/L (ref 0.5–2.2)
LACTIC ACID: 1.7 MMOL/L (ref 0.5–2.2)
LACTIC ACID: 2 MMOL/L (ref 0.5–2.2)
LACTIC ACID: 2 MMOL/L (ref 0.5–2.2)
LACTIC ACID: 2.1 MMOL/L (ref 0.5–2.2)
LACTIC ACID: 2.2 MMOL/L (ref 0.5–2.2)
LACTIC ACID: 2.2 MMOL/L (ref 0.5–2.2)
LACTIC ACID: 3 MMOL/L (ref 0.5–2.2)
LACTIC ACID: 3.4 MMOL/L (ref 0.5–2.2)
LACTIC ACID: 4.4 MMOL/L (ref 0.5–2.2)
LACTIC ACID: 5.2 MMOL/L (ref 0.5–2.2)
LACTIC ACID: 8.5 MMOL/L (ref 0.5–2.2)
LEUKOCYTE ESTERASE, URINE: NEGATIVE
LIPASE: 104 U/L (ref 13–60)
LIPASE: 127 U/L (ref 13–60)
LIPASE: 281 U/L (ref 13–60)
LIPASE: 455 U/L (ref 13–60)
LIPASE: 588 U/L (ref 13–60)
LISTERIA MONOCYTOGENES BY PCR: NOT DETECTED
LV EF: 63 %
LV EF: 63 %
LVEF MODALITY: NORMAL
LVEF MODALITY: NORMAL
LYMPHOCYTES ABSOLUTE: 0.25 E9/L (ref 1.5–4)
LYMPHOCYTES ABSOLUTE: 0.28 E9/L (ref 1.5–4)
LYMPHOCYTES ABSOLUTE: 0.34 E9/L (ref 1.5–4)
LYMPHOCYTES ABSOLUTE: 0.44 E9/L (ref 1.5–4)
LYMPHOCYTES ABSOLUTE: 0.45 E9/L (ref 1.5–4)
LYMPHOCYTES ABSOLUTE: 0.47 E9/L (ref 1.5–4)
LYMPHOCYTES ABSOLUTE: 0.6 E9/L (ref 1.5–4)
LYMPHOCYTES ABSOLUTE: 0.66 E9/L (ref 1.5–4)
LYMPHOCYTES ABSOLUTE: 0.8 E9/L (ref 1.5–4)
LYMPHOCYTES ABSOLUTE: 0.83 E9/L (ref 1.5–4)
LYMPHOCYTES ABSOLUTE: 0.86 E9/L (ref 1.5–4)
LYMPHOCYTES ABSOLUTE: 1.44 E9/L (ref 1.5–4)
LYMPHOCYTES ABSOLUTE: 1.5 E9/L (ref 1.5–4)
LYMPHOCYTES ABSOLUTE: 2.13 E9/L (ref 1.5–4)
LYMPHOCYTES ABSOLUTE: 4.2 E9/L (ref 1.5–4)
LYMPHOCYTES RELATIVE PERCENT: 0.9 % (ref 20–42)
LYMPHOCYTES RELATIVE PERCENT: 1.7 % (ref 20–42)
LYMPHOCYTES RELATIVE PERCENT: 1.7 % (ref 20–42)
LYMPHOCYTES RELATIVE PERCENT: 1.8 % (ref 20–42)
LYMPHOCYTES RELATIVE PERCENT: 14.4 % (ref 20–42)
LYMPHOCYTES RELATIVE PERCENT: 2 % (ref 20–42)
LYMPHOCYTES RELATIVE PERCENT: 2.6 % (ref 20–42)
LYMPHOCYTES RELATIVE PERCENT: 2.6 % (ref 20–42)
LYMPHOCYTES RELATIVE PERCENT: 2.9 % (ref 20–42)
LYMPHOCYTES RELATIVE PERCENT: 2.9 % (ref 20–42)
LYMPHOCYTES RELATIVE PERCENT: 3 % (ref 20–42)
LYMPHOCYTES RELATIVE PERCENT: 3.5 % (ref 20–42)
LYMPHOCYTES RELATIVE PERCENT: 34.8 % (ref 20–42)
LYMPHOCYTES RELATIVE PERCENT: 6.9 % (ref 20–42)
LYMPHOCYTES RELATIVE PERCENT: 8.7 % (ref 20–42)
Lab: ABNORMAL
Lab: NORMAL
Lab: NORMAL
MAGNESIUM: 1.4 MG/DL (ref 1.6–2.6)
MAGNESIUM: 1.7 MG/DL (ref 1.6–2.6)
MAGNESIUM: 1.9 MG/DL (ref 1.6–2.6)
MAGNESIUM: 1.9 MG/DL (ref 1.6–2.6)
MAGNESIUM: 2 MG/DL (ref 1.6–2.6)
MAGNESIUM: 2.1 MG/DL (ref 1.6–2.6)
MAGNESIUM: 2.2 MG/DL (ref 1.6–2.6)
MAGNESIUM: 2.2 MG/DL (ref 1.6–2.6)
MAGNESIUM: 2.3 MG/DL (ref 1.6–2.6)
MAGNESIUM: 2.4 MG/DL (ref 1.6–2.6)
MAGNESIUM: 2.4 MG/DL (ref 1.6–2.6)
MAGNESIUM: 2.5 MG/DL (ref 1.6–2.6)
MCH RBC QN AUTO: 29.7 PG (ref 26–35)
MCH RBC QN AUTO: 29.8 PG (ref 26–35)
MCH RBC QN AUTO: 29.8 PG (ref 26–35)
MCH RBC QN AUTO: 29.9 PG (ref 26–35)
MCH RBC QN AUTO: 30 PG (ref 26–35)
MCH RBC QN AUTO: 30.1 PG (ref 26–35)
MCH RBC QN AUTO: 30.2 PG (ref 26–35)
MCH RBC QN AUTO: 30.3 PG (ref 26–35)
MCH RBC QN AUTO: 30.5 PG (ref 26–35)
MCHC RBC AUTO-ENTMCNC: 31.1 % (ref 32–34.5)
MCHC RBC AUTO-ENTMCNC: 31.1 % (ref 32–34.5)
MCHC RBC AUTO-ENTMCNC: 31.3 % (ref 32–34.5)
MCHC RBC AUTO-ENTMCNC: 31.4 % (ref 32–34.5)
MCHC RBC AUTO-ENTMCNC: 31.8 % (ref 32–34.5)
MCHC RBC AUTO-ENTMCNC: 31.9 % (ref 32–34.5)
MCHC RBC AUTO-ENTMCNC: 32.4 % (ref 32–34.5)
MCHC RBC AUTO-ENTMCNC: 32.5 % (ref 32–34.5)
MCHC RBC AUTO-ENTMCNC: 32.6 % (ref 32–34.5)
MCHC RBC AUTO-ENTMCNC: 32.9 % (ref 32–34.5)
MCHC RBC AUTO-ENTMCNC: 33 % (ref 32–34.5)
MCHC RBC AUTO-ENTMCNC: 33.1 % (ref 32–34.5)
MCHC RBC AUTO-ENTMCNC: 33.1 % (ref 32–34.5)
MCHC RBC AUTO-ENTMCNC: 33.2 % (ref 32–34.5)
MCHC RBC AUTO-ENTMCNC: 33.5 % (ref 32–34.5)
MCV RBC AUTO: 89.7 FL (ref 80–99.9)
MCV RBC AUTO: 90.6 FL (ref 80–99.9)
MCV RBC AUTO: 90.8 FL (ref 80–99.9)
MCV RBC AUTO: 90.8 FL (ref 80–99.9)
MCV RBC AUTO: 90.9 FL (ref 80–99.9)
MCV RBC AUTO: 91.3 FL (ref 80–99.9)
MCV RBC AUTO: 91.6 FL (ref 80–99.9)
MCV RBC AUTO: 91.8 FL (ref 80–99.9)
MCV RBC AUTO: 92.7 FL (ref 80–99.9)
MCV RBC AUTO: 93.9 FL (ref 80–99.9)
MCV RBC AUTO: 94.5 FL (ref 80–99.9)
MCV RBC AUTO: 96 FL (ref 80–99.9)
MCV RBC AUTO: 96.4 FL (ref 80–99.9)
MCV RBC AUTO: 96.7 FL (ref 80–99.9)
MCV RBC AUTO: 97.4 FL (ref 80–99.9)
METAMYELOCYTES RELATIVE PERCENT: 0.9 % (ref 0–1)
METER GLUCOSE: 133 MG/DL (ref 74–99)
METER GLUCOSE: 134 MG/DL (ref 74–99)
METER GLUCOSE: 135 MG/DL (ref 74–99)
METER GLUCOSE: 136 MG/DL (ref 74–99)
METER GLUCOSE: 136 MG/DL (ref 74–99)
METER GLUCOSE: 137 MG/DL (ref 74–99)
METER GLUCOSE: 139 MG/DL (ref 74–99)
METER GLUCOSE: 140 MG/DL (ref 74–99)
METER GLUCOSE: 145 MG/DL (ref 74–99)
METER GLUCOSE: 146 MG/DL (ref 74–99)
METER GLUCOSE: 148 MG/DL (ref 74–99)
METER GLUCOSE: 149 MG/DL (ref 74–99)
METER GLUCOSE: 149 MG/DL (ref 74–99)
METER GLUCOSE: 150 MG/DL (ref 74–99)
METER GLUCOSE: 151 MG/DL (ref 74–99)
METER GLUCOSE: 151 MG/DL (ref 74–99)
METER GLUCOSE: 153 MG/DL (ref 74–99)
METER GLUCOSE: 153 MG/DL (ref 74–99)
METER GLUCOSE: 154 MG/DL (ref 74–99)
METER GLUCOSE: 157 MG/DL (ref 74–99)
METER GLUCOSE: 158 MG/DL (ref 74–99)
METER GLUCOSE: 160 MG/DL (ref 74–99)
METER GLUCOSE: 160 MG/DL (ref 74–99)
METER GLUCOSE: 161 MG/DL (ref 74–99)
METER GLUCOSE: 162 MG/DL (ref 74–99)
METER GLUCOSE: 164 MG/DL (ref 74–99)
METER GLUCOSE: 167 MG/DL (ref 74–99)
METER GLUCOSE: 168 MG/DL (ref 74–99)
METER GLUCOSE: 169 MG/DL (ref 74–99)
METER GLUCOSE: 170 MG/DL (ref 74–99)
METER GLUCOSE: 173 MG/DL (ref 74–99)
METER GLUCOSE: 173 MG/DL (ref 74–99)
METER GLUCOSE: 174 MG/DL (ref 74–99)
METER GLUCOSE: 175 MG/DL (ref 74–99)
METER GLUCOSE: 177 MG/DL (ref 74–99)
METER GLUCOSE: 188 MG/DL (ref 74–99)
METER GLUCOSE: 189 MG/DL (ref 74–99)
METER GLUCOSE: 191 MG/DL (ref 74–99)
METER GLUCOSE: 194 MG/DL (ref 74–99)
METER GLUCOSE: 199 MG/DL (ref 74–99)
METER GLUCOSE: 203 MG/DL (ref 74–99)
METER GLUCOSE: 207 MG/DL (ref 74–99)
METER GLUCOSE: 218 MG/DL (ref 74–99)
METER GLUCOSE: 231 MG/DL (ref 74–99)
METER GLUCOSE: 240 MG/DL (ref 74–99)
METER GLUCOSE: 247 MG/DL (ref 74–99)
METHADONE SCREEN, URINE: NOT DETECTED
METHB: 0.2 % (ref 0–1.5)
METHB: 0.3 % (ref 0–1.5)
METHB: 0.4 % (ref 0–1.5)
METHB: 0.5 % (ref 0–1.5)
METHB: 0.6 % (ref 0–1.5)
MODE: ABNORMAL
MODE: AC
MONOCYTES ABSOLUTE: 0.17 E9/L (ref 0.1–0.95)
MONOCYTES ABSOLUTE: 0.22 E9/L (ref 0.1–0.95)
MONOCYTES ABSOLUTE: 0.28 E9/L (ref 0.1–0.95)
MONOCYTES ABSOLUTE: 0.3 E9/L (ref 0.1–0.95)
MONOCYTES ABSOLUTE: 0.47 E9/L (ref 0.1–0.95)
MONOCYTES ABSOLUTE: 0.48 E9/L (ref 0.1–0.95)
MONOCYTES ABSOLUTE: 0.5 E9/L (ref 0.1–0.95)
MONOCYTES ABSOLUTE: 0.66 E9/L (ref 0.1–0.95)
MONOCYTES ABSOLUTE: 0.68 E9/L (ref 0.1–0.95)
MONOCYTES ABSOLUTE: 0.79 E9/L (ref 0.1–0.95)
MONOCYTES ABSOLUTE: 0.82 E9/L (ref 0.1–0.95)
MONOCYTES ABSOLUTE: 1.2 E9/L (ref 0.1–0.95)
MONOCYTES ABSOLUTE: 1.24 E9/L (ref 0.1–0.95)
MONOCYTES ABSOLUTE: 1.34 E9/L (ref 0.1–0.95)
MONOCYTES ABSOLUTE: 1.67 E9/L (ref 0.1–0.95)
MONOCYTES RELATIVE PERCENT: 0.9 % (ref 2–12)
MONOCYTES RELATIVE PERCENT: 0.9 % (ref 2–12)
MONOCYTES RELATIVE PERCENT: 1.7 % (ref 2–12)
MONOCYTES RELATIVE PERCENT: 1.7 % (ref 2–12)
MONOCYTES RELATIVE PERCENT: 2 % (ref 2–12)
MONOCYTES RELATIVE PERCENT: 2 % (ref 2–12)
MONOCYTES RELATIVE PERCENT: 2.6 % (ref 2–12)
MONOCYTES RELATIVE PERCENT: 2.6 % (ref 2–12)
MONOCYTES RELATIVE PERCENT: 2.7 % (ref 2–12)
MONOCYTES RELATIVE PERCENT: 3.5 % (ref 2–12)
MONOCYTES RELATIVE PERCENT: 3.8 % (ref 2–12)
MONOCYTES RELATIVE PERCENT: 5.9 % (ref 2–12)
MONOCYTES RELATIVE PERCENT: 6.3 % (ref 2–12)
MONOCYTES RELATIVE PERCENT: 6.9 % (ref 2–12)
MONOCYTES RELATIVE PERCENT: 9 % (ref 2–12)
MUCUS: PRESENT /LPF
MUCUS: PRESENT /LPF
NEGATIVE QC PASS/FAIL: NORMAL
NEISSERIA MENINGITIDIS BY PCR: NOT DETECTED
NEUTROPHILS ABSOLUTE: 11.14 E9/L (ref 1.8–7.3)
NEUTROPHILS ABSOLUTE: 13.63 E9/L (ref 1.8–7.3)
NEUTROPHILS ABSOLUTE: 14.35 E9/L (ref 1.8–7.3)
NEUTROPHILS ABSOLUTE: 14.44 E9/L (ref 1.8–7.3)
NEUTROPHILS ABSOLUTE: 16.59 E9/L (ref 1.8–7.3)
NEUTROPHILS ABSOLUTE: 18.01 E9/L (ref 1.8–7.3)
NEUTROPHILS ABSOLUTE: 19.28 E9/L (ref 1.8–7.3)
NEUTROPHILS ABSOLUTE: 20.74 E9/L (ref 1.8–7.3)
NEUTROPHILS ABSOLUTE: 20.9 E9/L (ref 1.8–7.3)
NEUTROPHILS ABSOLUTE: 21.6 E9/L (ref 1.8–7.3)
NEUTROPHILS ABSOLUTE: 22.6 E9/L (ref 1.8–7.3)
NEUTROPHILS ABSOLUTE: 24.04 E9/L (ref 1.8–7.3)
NEUTROPHILS ABSOLUTE: 24.35 E9/L (ref 1.8–7.3)
NEUTROPHILS ABSOLUTE: 26.13 E9/L (ref 1.8–7.3)
NEUTROPHILS ABSOLUTE: 7.32 E9/L (ref 1.8–7.3)
NEUTROPHILS RELATIVE PERCENT: 60 % (ref 43–80)
NEUTROPHILS RELATIVE PERCENT: 75.1 % (ref 43–80)
NEUTROPHILS RELATIVE PERCENT: 83.1 % (ref 43–80)
NEUTROPHILS RELATIVE PERCENT: 86 % (ref 43–80)
NEUTROPHILS RELATIVE PERCENT: 90 % (ref 43–80)
NEUTROPHILS RELATIVE PERCENT: 92.3 % (ref 43–80)
NEUTROPHILS RELATIVE PERCENT: 94.4 % (ref 43–80)
NEUTROPHILS RELATIVE PERCENT: 94.7 % (ref 43–80)
NEUTROPHILS RELATIVE PERCENT: 94.8 % (ref 43–80)
NEUTROPHILS RELATIVE PERCENT: 95.6 % (ref 43–80)
NEUTROPHILS RELATIVE PERCENT: 95.7 % (ref 43–80)
NEUTROPHILS RELATIVE PERCENT: 96 % (ref 43–80)
NEUTROPHILS RELATIVE PERCENT: 96.5 % (ref 43–80)
NEUTROPHILS RELATIVE PERCENT: 97.4 % (ref 43–80)
NEUTROPHILS RELATIVE PERCENT: 97.4 % (ref 43–80)
NITRITE, URINE: NEGATIVE
NITRITE, URINE: POSITIVE
NUCLEATED RED BLOOD CELLS: 1.8 /100 WBC
O2 CONTENT: 15.8 ML/DL
O2 SATURATION: 100 % (ref 92–98.5)
O2 SATURATION: 92.7 % (ref 92–98.5)
O2 SATURATION: 93.2 % (ref 92–98.5)
O2 SATURATION: 93.5 % (ref 92–98.5)
O2 SATURATION: 93.8 % (ref 92–98.5)
O2 SATURATION: 94.1 % (ref 92–98.5)
O2 SATURATION: 94.7 % (ref 92–98.5)
O2 SATURATION: 94.9 % (ref 92–98.5)
O2 SATURATION: 95.4 % (ref 92–98.5)
O2 SATURATION: 95.7 % (ref 92–98.5)
O2 SATURATION: 96 % (ref 92–98.5)
O2 SATURATION: 96.8 % (ref 92–98.5)
O2 SATURATION: 97.7 % (ref 92–98.5)
O2 SATURATION: 97.9 % (ref 92–98.5)
O2 SATURATION: 97.9 % (ref 92–98.5)
O2 SATURATION: 98.5 % (ref 92–98.5)
O2 SATURATION: 98.6 % (ref 92–98.5)
O2 SATURATION: 98.7 % (ref 92–98.5)
O2 SATURATION: 98.7 % (ref 92–98.5)
O2 SATURATION: 98.8 % (ref 92–98.5)
O2 SATURATION: 98.9 % (ref 92–98.5)
O2 SATURATION: 98.9 % (ref 92–98.5)
O2 SATURATION: 99 % (ref 92–98.5)
O2 SATURATION: 99 % (ref 92–98.5)
O2 SATURATION: 99.2 % (ref 92–98.5)
O2 SATURATION: 99.6 % (ref 92–98.5)
O2 SATURATION: 99.6 % (ref 92–98.5)
O2 SATURATION: 99.7 % (ref 92–98.5)
O2HB: 91.4 % (ref 94–97)
O2HB: 92.1 % (ref 94–97)
O2HB: 92.4 % (ref 94–97)
O2HB: 92.6 % (ref 94–97)
O2HB: 92.9 % (ref 94–97)
O2HB: 93.1 % (ref 94–97)
O2HB: 94 % (ref 94–97)
O2HB: 94.3 % (ref 94–97)
O2HB: 94.3 % (ref 94–97)
O2HB: 94.6 % (ref 94–97)
O2HB: 95.2 % (ref 94–97)
O2HB: 95.9 % (ref 94–97)
O2HB: 96.7 % (ref 94–97)
O2HB: 96.9 % (ref 94–97)
O2HB: 97.1 % (ref 94–97)
O2HB: 97.3 % (ref 94–97)
O2HB: 97.5 % (ref 94–97)
O2HB: 97.7 % (ref 94–97)
O2HB: 97.8 % (ref 94–97)
O2HB: 97.9 % (ref 94–97)
O2HB: 97.9 % (ref 94–97)
O2HB: 98.2 % (ref 94–97)
O2HB: 98.2 % (ref 94–97)
O2HB: 98.5 % (ref 94–97)
OPERATOR ID: 1210
OPERATOR ID: 1721
OPERATOR ID: 1721
OPERATOR ID: 2067
OPERATOR ID: 2067
OPERATOR ID: 2593
OPERATOR ID: 2856
OPERATOR ID: 2863
OPERATOR ID: 2863
OPERATOR ID: 359
OPERATOR ID: 359
OPERATOR ID: 366
OPERATOR ID: 459
OPERATOR ID: 7221
OPERATOR ID: 7490
OPERATOR ID: 7490
OPERATOR ID: 882
OPERATOR ID: ABNORMAL
OPIATE SCREEN URINE: NOT DETECTED
ORDER NUMBER: ABNORMAL
ORGANISM: ABNORMAL
OSMOLALITY URINE: 124 MOSM/KG (ref 300–900)
OSMOLALITY URINE: 715 MOSM/KG (ref 300–900)
OSMOLALITY: 295 MOSM/KG (ref 285–310)
OSMOLALITY: 317 MOSM/KG (ref 285–310)
OSMOLALITY: 339 MOSM/KG (ref 285–310)
OSMOLALITY: 350 MOSM/KG (ref 285–310)
OVALOCYTES: ABNORMAL
OXYCODONE URINE: NOT DETECTED
PATIENT TEMP: 37 C
PCO2 37: 37.3 MMHG (ref 35–45)
PCO2: 26.8 MMHG (ref 35–45)
PCO2: 26.8 MMHG (ref 35–45)
PCO2: 29.4 MMHG (ref 35–45)
PCO2: 29.6 MMHG (ref 35–45)
PCO2: 31.9 MMHG (ref 35–45)
PCO2: 32 MMHG (ref 35–45)
PCO2: 33 MMHG (ref 35–45)
PCO2: 34.3 MMHG (ref 35–45)
PCO2: 34.5 MMHG (ref 35–45)
PCO2: 35.3 MMHG (ref 35–45)
PCO2: 36.1 MMHG (ref 35–45)
PCO2: 36.1 MMHG (ref 35–45)
PCO2: 36.4 MMHG (ref 35–45)
PCO2: 37 MMHG (ref 35–45)
PCO2: 37.2 MMHG (ref 35–45)
PCO2: 37.4 MMHG (ref 35–45)
PCO2: 38 MMHG (ref 35–45)
PCO2: 38.7 MMHG (ref 35–45)
PCO2: 39 MMHG (ref 35–45)
PCO2: 39.1 MMHG (ref 35–45)
PCO2: 40.3 MMHG (ref 35–45)
PCO2: 43.1 MMHG (ref 35–45)
PCO2: 45.2 MMHG (ref 35–45)
PCO2: 48.1 MMHG (ref 35–45)
PCO2: 51 MMHG (ref 35–45)
PCO2: 56.4 MMHG (ref 35–45)
PCO2: 70.6 MMHG (ref 35–45)
PDW BLD-RTO: 14.1 FL (ref 11.5–15)
PDW BLD-RTO: 14.2 FL (ref 11.5–15)
PDW BLD-RTO: 14.3 FL (ref 11.5–15)
PDW BLD-RTO: 14.3 FL (ref 11.5–15)
PDW BLD-RTO: 14.4 FL (ref 11.5–15)
PDW BLD-RTO: 14.4 FL (ref 11.5–15)
PDW BLD-RTO: 14.5 FL (ref 11.5–15)
PDW BLD-RTO: 14.6 FL (ref 11.5–15)
PDW BLD-RTO: 14.7 FL (ref 11.5–15)
PDW BLD-RTO: 15 FL (ref 11.5–15)
PDW BLD-RTO: 15.4 FL (ref 11.5–15)
PDW BLD-RTO: 15.4 FL (ref 11.5–15)
PDW BLD-RTO: 15.7 FL (ref 11.5–15)
PEEP/CPAP: 0 CMH2O
PEEP/CPAP: 12 CMH2O
PEEP/CPAP: 5 CMH2O
PEEP/CPAP: 8 CMH2O
PFO2: 1.27 MMHG/%
PFO2: 1.3 MMHG/%
PFO2: 1.4 MMHG/%
PFO2: 1.51 MMHG/%
PFO2: 1.51 MMHG/%
PFO2: 1.63 MMHG/%
PFO2: 1.69 MMHG/%
PFO2: 1.75 MMHG/%
PFO2: 1.84 MMHG/%
PFO2: 1.84 MMHG/%
PFO2: 1.9 MMHG/%
PFO2: 1.9 MMHG/%
PFO2: 1.93 MMHG/%
PFO2: 2.22 MMHG/%
PFO2: 2.53 MMHG/%
PFO2: 2.63 MMHG/%
PFO2: 2.69 MMHG/%
PFO2: 2.75 MMHG/%
PFO2: 3.07 MMHG/%
PFO2: 3.47 MMHG/%
PFO2: 3.52 MMHG/%
PFO2: 3.55 MMHG/%
PFO2: 3.94 MMHG/%
PFO2: 3.97 MMHG/%
PFO2: 4.06 MMHG/%
PFO2: 4.08 MMHG/%
PFO2: 4.2 MMHG/%
PH 37: 7.39 (ref 7.35–7.45)
PH BLOOD GAS: 7.04 (ref 7.35–7.45)
PH BLOOD GAS: 7.22 (ref 7.35–7.45)
PH BLOOD GAS: 7.29 (ref 7.35–7.45)
PH BLOOD GAS: 7.3 (ref 7.35–7.45)
PH BLOOD GAS: 7.35 (ref 7.35–7.45)
PH BLOOD GAS: 7.36 (ref 7.35–7.45)
PH BLOOD GAS: 7.38 (ref 7.35–7.45)
PH BLOOD GAS: 7.4 (ref 7.35–7.45)
PH BLOOD GAS: 7.4 (ref 7.35–7.45)
PH BLOOD GAS: 7.41 (ref 7.35–7.45)
PH BLOOD GAS: 7.41 (ref 7.35–7.45)
PH BLOOD GAS: 7.42 (ref 7.35–7.45)
PH BLOOD GAS: 7.43 (ref 7.35–7.45)
PH BLOOD GAS: 7.44 (ref 7.35–7.45)
PH BLOOD GAS: 7.44 (ref 7.35–7.45)
PH BLOOD GAS: 7.45 (ref 7.35–7.45)
PH BLOOD GAS: 7.46 (ref 7.35–7.45)
PH BLOOD GAS: 7.49 (ref 7.35–7.45)
PH BLOOD GAS: 7.5 (ref 7.35–7.45)
PH BLOOD GAS: 7.52 (ref 7.35–7.45)
PH BLOOD GAS: 7.52 (ref 7.35–7.45)
PH BLOOD GAS: 7.55 (ref 7.35–7.45)
PH UA: 5.5 (ref 5–9)
PH UA: 5.5 (ref 5–9)
PH UA: 6 (ref 5–9)
PH UA: 6 (ref 5–9)
PH UA: 7 (ref 5–9)
PHENCYCLIDINE SCREEN URINE: NOT DETECTED
PHOSPHORUS: 1.4 MG/DL (ref 2.5–4.5)
PHOSPHORUS: 2 MG/DL (ref 2.5–4.5)
PHOSPHORUS: 2.2 MG/DL (ref 2.5–4.5)
PHOSPHORUS: 2.2 MG/DL (ref 2.5–4.5)
PHOSPHORUS: 2.3 MG/DL (ref 2.5–4.5)
PHOSPHORUS: 2.5 MG/DL (ref 2.5–4.5)
PHOSPHORUS: 2.5 MG/DL (ref 2.5–4.5)
PHOSPHORUS: 2.6 MG/DL (ref 2.5–4.5)
PHOSPHORUS: 2.8 MG/DL (ref 2.5–4.5)
PHOSPHORUS: 2.9 MG/DL (ref 2.5–4.5)
PHOSPHORUS: 3 MG/DL (ref 2.5–4.5)
PHOSPHORUS: 3.1 MG/DL (ref 2.5–4.5)
PHOSPHORUS: 3.4 MG/DL (ref 2.5–4.5)
PHOSPHORUS: 3.4 MG/DL (ref 2.5–4.5)
PHOSPHORUS: 3.7 MG/DL (ref 2.5–4.5)
PHOSPHORUS: 4.1 MG/DL (ref 2.5–4.5)
PHOSPHORUS: 4.6 MG/DL (ref 2.5–4.5)
PHOSPHORUS: 5.1 MG/DL (ref 2.5–4.5)
PHOSPHORUS: 5.2 MG/DL (ref 2.5–4.5)
PHOSPHORUS: 5.2 MG/DL (ref 2.5–4.5)
PIP: 15 CMH2O
PIP: 17 CMH2O
PIP: 9 CMH2O
PLATELET # BLD: 104 E9/L (ref 130–450)
PLATELET # BLD: 119 E9/L (ref 130–450)
PLATELET # BLD: 121 E9/L (ref 130–450)
PLATELET # BLD: 125 E9/L (ref 130–450)
PLATELET # BLD: 127 E9/L (ref 130–450)
PLATELET # BLD: 135 E9/L (ref 130–450)
PLATELET # BLD: 139 E9/L (ref 130–450)
PLATELET # BLD: 146 E9/L (ref 130–450)
PLATELET # BLD: 148 E9/L (ref 130–450)
PLATELET # BLD: 150 E9/L (ref 130–450)
PLATELET # BLD: 151 E9/L (ref 130–450)
PLATELET # BLD: 153 E9/L (ref 130–450)
PLATELET # BLD: 189 E9/L (ref 130–450)
PLATELET # BLD: 196 E9/L (ref 130–450)
PLATELET # BLD: 252 E9/L (ref 130–450)
PMV BLD AUTO: 10.5 FL (ref 7–12)
PMV BLD AUTO: 10.6 FL (ref 7–12)
PMV BLD AUTO: 11.1 FL (ref 7–12)
PMV BLD AUTO: 11.3 FL (ref 7–12)
PMV BLD AUTO: 11.4 FL (ref 7–12)
PMV BLD AUTO: 11.5 FL (ref 7–12)
PMV BLD AUTO: 11.6 FL (ref 7–12)
PMV BLD AUTO: 11.6 FL (ref 7–12)
PMV BLD AUTO: 11.7 FL (ref 7–12)
PMV BLD AUTO: 11.8 FL (ref 7–12)
PMV BLD AUTO: 11.9 FL (ref 7–12)
PMV BLD AUTO: 11.9 FL (ref 7–12)
PMV BLD AUTO: 12.1 FL (ref 7–12)
PMV BLD AUTO: 12.1 FL (ref 7–12)
PMV BLD AUTO: 12.5 FL (ref 7–12)
PO2 37: 441 MMHG (ref 80–100)
PO2: 105 MMHG (ref 75–100)
PO2: 119.2 MMHG (ref 75–100)
PO2: 121.9 MMHG (ref 75–100)
PO2: 122.4 MMHG (ref 75–100)
PO2: 125.9 MMHG (ref 75–100)
PO2: 130.3 MMHG (ref 75–100)
PO2: 139 MMHG (ref 75–100)
PO2: 140.9 MMHG (ref 75–100)
PO2: 142.1 MMHG (ref 75–100)
PO2: 150.6 MMHG (ref 75–100)
PO2: 151.1 MMHG (ref 75–100)
PO2: 157.6 MMHG (ref 75–100)
PO2: 252.5 MMHG (ref 75–100)
PO2: 268.6 MMHG (ref 75–100)
PO2: 274.9 MMHG (ref 75–100)
PO2: 307 MMHG (ref 75–100)
PO2: 63.3 MMHG (ref 75–100)
PO2: 65.1 MMHG (ref 75–100)
PO2: 67.4 MMHG (ref 75–100)
PO2: 70.1 MMHG (ref 75–100)
PO2: 70.2 MMHG (ref 75–100)
PO2: 73.4 MMHG (ref 75–100)
PO2: 73.7 MMHG (ref 75–100)
PO2: 76 MMHG (ref 75–100)
PO2: 76 MMHG (ref 75–100)
PO2: 77.3 MMHG (ref 75–100)
PO2: 88.7 MMHG (ref 75–100)
POC BUN: 20 MG/DL (ref 8–23)
POC CHLORIDE: 113 MMOL/L (ref 100–108)
POC CO2: 22.9 MMOL/L (ref 22–29)
POC CREATININE: 0.9 MG/DL (ref 0.5–1)
POC IONIZED CALCIUM: 1.3 (ref 1.1–1.3)
POC LACTIC ACID: 1.4 (ref 0.5–2.2)
POC SODIUM: 148 MMOL/L (ref 132–146)
POC SOURCE: ABNORMAL
POIKILOCYTES: ABNORMAL
POLYCHROMASIA: ABNORMAL
POSITIVE QC PASS/FAIL: NORMAL
POTASSIUM SERPL-SCNC: 2.8 MMOL/L (ref 3.5–5)
POTASSIUM SERPL-SCNC: 2.9 MMOL/L (ref 3.5–5)
POTASSIUM SERPL-SCNC: 2.94 MMOL/L (ref 3.5–5)
POTASSIUM SERPL-SCNC: 3.1 MMOL/L (ref 3.5–5)
POTASSIUM SERPL-SCNC: 3.2 MMOL/L (ref 3.5–5)
POTASSIUM SERPL-SCNC: 3.3 MMOL/L (ref 3.5–5)
POTASSIUM SERPL-SCNC: 3.3 MMOL/L (ref 3.5–5.5)
POTASSIUM SERPL-SCNC: 3.4 MMOL/L (ref 3.5–5)
POTASSIUM SERPL-SCNC: 3.5 MMOL/L (ref 3.5–5)
POTASSIUM SERPL-SCNC: 3.6 MMOL/L (ref 3.5–5)
POTASSIUM SERPL-SCNC: 3.6 MMOL/L (ref 3.5–5)
POTASSIUM SERPL-SCNC: 3.7 MMOL/L (ref 3.5–5)
POTASSIUM SERPL-SCNC: 3.8 MMOL/L (ref 3.5–5)
POTASSIUM SERPL-SCNC: 3.9 MMOL/L (ref 3.5–5)
POTASSIUM SERPL-SCNC: 4 MMOL/L (ref 3.5–5)
POTASSIUM SERPL-SCNC: 4.1 MMOL/L (ref 3.5–5)
POTASSIUM SERPL-SCNC: 4.7 MMOL/L (ref 3.5–5)
POTASSIUM, UR: 19.1 MMOL/L
POTASSIUM, UR: >100 MMOL/L
PROCALCITONIN: 12.15 NG/ML (ref 0–0.08)
PROCALCITONIN: 3.63 NG/ML (ref 0–0.08)
PROTEIN UA: 30 MG/DL
PROTEIN UA: 30 MG/DL
PROTEIN UA: >=300 MG/DL
PROTEIN UA: ABNORMAL MG/DL
PROTEIN UA: NEGATIVE MG/DL
PROTEUS SPECIES BY PCR: NOT DETECTED
PROTHROMBIN TIME: 11.6 SEC (ref 9.3–12.4)
PROTHROMBIN TIME: 14.9 SEC (ref 9.3–12.4)
PROTHROMBIN TIME: 15 SEC (ref 9.3–12.4)
PROTHROMBIN TIME: 15.1 SEC (ref 9.3–12.4)
PROTHROMBIN TIME: 15.2 SEC (ref 9.3–12.4)
PROTHROMBIN TIME: 15.2 SEC (ref 9.3–12.4)
PROTHROMBIN TIME: 15.3 SEC (ref 9.3–12.4)
PROTHROMBIN TIME: 15.5 SEC (ref 9.3–12.4)
PROTHROMBIN TIME: 17.3 SEC (ref 9.3–12.4)
PROTHROMBIN TIME: 17.4 SEC (ref 9.3–12.4)
PS: 0 CMH20
PS: 15 CMH20
PS: 18 CMH20
PS: 28 CMH20
PSEUDOMONAS AERUGINOSA BY PCR: NOT DETECTED
RBC # BLD: 2.62 E12/L (ref 3.5–5.5)
RBC # BLD: 2.63 E12/L (ref 3.5–5.5)
RBC # BLD: 2.71 E12/L (ref 3.5–5.5)
RBC # BLD: 2.8 E12/L (ref 3.5–5.5)
RBC # BLD: 2.82 E12/L (ref 3.5–5.5)
RBC # BLD: 2.87 E12/L (ref 3.5–5.5)
RBC # BLD: 2.88 E12/L (ref 3.5–5.5)
RBC # BLD: 2.93 E12/L (ref 3.5–5.5)
RBC # BLD: 2.98 E12/L (ref 3.5–5.5)
RBC # BLD: 3.47 E12/L (ref 3.5–5.5)
RBC # BLD: 3.58 E12/L (ref 3.5–5.5)
RBC # BLD: 3.59 E12/L (ref 3.5–5.5)
RBC # BLD: 4.24 E12/L (ref 3.5–5.5)
RBC # BLD: 4.24 E12/L (ref 3.5–5.5)
RBC # BLD: 4.61 E12/L (ref 3.5–5.5)
RBC UA: ABNORMAL /HPF (ref 0–2)
RBC UA: NORMAL /HPF (ref 0–2)
RI(T): 0.33
RI(T): 0.36
RI(T): 0.39
RI(T): 0.47
RI(T): 0.53
RI(T): 0.68
RI(T): 0.71
RI(T): 0.72
RI(T): 1.12
RI(T): 1.24
RI(T): 1.37
RI(T): 1.41
RI(T): 1.54
RI(T): 1.59
RI(T): 1.94
RI(T): 1.95
RI(T): 2.04
RI(T): 2.06
RI(T): 2.13
RI(T): 2.33
RI(T): 2.46
RI(T): 2.54
RI(T): 3.25
RI(T): 3.29
RI(T): 3.74
RI(T): 3.79
RR MECHANICAL: 10 B/MIN
RR MECHANICAL: 12 B/MIN
RR MECHANICAL: 14 B/MIN
RR MECHANICAL: 15 B/MIN
RR MECHANICAL: 16 B/MIN
RR MECHANICAL: 18 B/MIN
RR MECHANICAL: 20 B/MIN
RR MECHANICAL: 6 B/MIN
SALICYLATE, SERUM: <0.3 MG/DL (ref 0–30)
SALICYLATE, SERUM: <0.3 MG/DL (ref 0–30)
SALMONELLA SPECIES BY PCR: NOT DETECTED
SARS-COV-2, PCR: NOT DETECTED
SCHISTOCYTES: ABNORMAL
SCHISTOCYTES: ABNORMAL
SERRATIA MARCESCENS BY PCR: NOT DETECTED
SMEAR, RESPIRATORY: ABNORMAL
SODIUM BLD-SCNC: 142 MMOL/L (ref 132–146)
SODIUM BLD-SCNC: 143 MMOL/L (ref 132–146)
SODIUM BLD-SCNC: 144 MMOL/L (ref 132–146)
SODIUM BLD-SCNC: 145 MMOL/L (ref 132–146)
SODIUM BLD-SCNC: 146 MMOL/L (ref 132–146)
SODIUM BLD-SCNC: 147 MMOL/L (ref 132–146)
SODIUM BLD-SCNC: 148 MMOL/L (ref 132–146)
SODIUM BLD-SCNC: 149 MMOL/L (ref 132–146)
SODIUM BLD-SCNC: 150 MMOL/L (ref 132–146)
SODIUM BLD-SCNC: 150 MMOL/L (ref 132–146)
SODIUM BLD-SCNC: 151 MMOL/L (ref 132–146)
SODIUM BLD-SCNC: 152 MMOL/L (ref 132–146)
SODIUM BLD-SCNC: 152 MMOL/L (ref 132–146)
SODIUM BLD-SCNC: 153 MMOL/L (ref 132–146)
SODIUM BLD-SCNC: 154 MMOL/L (ref 132–146)
SODIUM BLD-SCNC: 155 MMOL/L (ref 132–146)
SODIUM BLD-SCNC: 156 MMOL/L (ref 132–146)
SODIUM BLD-SCNC: 157 MMOL/L (ref 132–146)
SODIUM BLD-SCNC: 158 MMOL/L (ref 132–146)
SODIUM BLD-SCNC: 160 MMOL/L (ref 132–146)
SODIUM BLD-SCNC: 161 MMOL/L (ref 132–146)
SODIUM BLD-SCNC: 162 MMOL/L (ref 132–146)
SODIUM BLD-SCNC: 162 MMOL/L (ref 132–146)
SODIUM BLD-SCNC: 166 MMOL/L (ref 132–146)
SODIUM BLD-SCNC: 169 MMOL/L (ref 132–146)
SODIUM BLD-SCNC: 169 MMOL/L (ref 132–146)
SODIUM URINE: 45 MMOL/L
SODIUM URINE: <20 MMOL/L
SODIUM URINE: <20 MMOL/L
SOURCE OF BLOOD CULTURE: ABNORMAL
SOURCE, BLOOD GAS: ABNORMAL
SOURCE, BLOOD GAS: NORMAL
SPECIFIC GRAVITY UA: 1.02 (ref 1–1.03)
SPECIFIC GRAVITY UA: 1.02 (ref 1–1.03)
SPECIFIC GRAVITY UA: <=1.005 (ref 1–1.03)
SPECIFIC GRAVITY UA: >=1.03 (ref 1–1.03)
SPECIFIC GRAVITY UA: >=1.03 (ref 1–1.03)
STAPHYLOCOCCUS AUREUS BY PCR: NOT DETECTED
STAPHYLOCOCCUS EPIDERMIDIS BY PCR: NOT DETECTED
STAPHYLOCOCCUS LUGDUNENSIS BY PCR: NOT DETECTED
STAPHYLOCOCCUS SPECIES BY PCR: DETECTED
STENOTROPHOMONAS MALTOPHILIA BY PCR: NOT DETECTED
STREPTOCOCCUS AGALACTIAE BY PCR: NOT DETECTED
STREPTOCOCCUS PNEUMONIAE BY PCR: NOT DETECTED
STREPTOCOCCUS PYOGENES  BY PCR: NOT DETECTED
STREPTOCOCCUS SPECIES BY PCR: NOT DETECTED
TARGET CELLS: ABNORMAL
TARGET CELLS: ABNORMAL
THB: 10 G/DL (ref 11.5–16.5)
THB: 10.1 G/DL (ref 11.5–16.5)
THB: 11.1 G/DL (ref 11.5–16.5)
THB: 11.3 G/DL (ref 11.5–16.5)
THB: 11.8 G/DL (ref 11.5–16.5)
THB: 11.9 G/DL (ref 11.5–16.5)
THB: 12.1 G/DL (ref 11.5–16.5)
THB: 12.2 G/DL (ref 11.5–16.5)
THB: 12.8 G/DL (ref 11.5–16.5)
THB: 12.9 G/DL (ref 11.5–16.5)
THB: 13.2 G/DL (ref 11.5–16.5)
THB: 13.5 G/DL (ref 11.5–16.5)
THB: 14.7 G/DL (ref 11.5–16.5)
THB: 8.7 G/DL (ref 11.5–16.5)
THB: 8.7 G/DL (ref 11.5–16.5)
THB: 8.8 G/DL (ref 11.5–16.5)
THB: 8.8 G/DL (ref 11.5–16.5)
THB: 8.9 G/DL (ref 11.5–16.5)
THB: 9 G/DL (ref 11.5–16.5)
THB: 9.1 G/DL (ref 11.5–16.5)
THB: 9.2 G/DL (ref 11.5–16.5)
THB: 9.2 G/DL (ref 11.5–16.5)
THB: 9.4 G/DL (ref 11.5–16.5)
THB: 9.5 G/DL (ref 11.5–16.5)
THB: 9.6 G/DL (ref 11.5–16.5)
THB: 9.7 G/DL (ref 11.5–16.5)
THB: 9.8 G/DL (ref 11.5–16.5)
TIME ANALYZED: 1
TIME ANALYZED: 1037
TIME ANALYZED: 1042
TIME ANALYZED: 1106
TIME ANALYZED: 1133
TIME ANALYZED: 118
TIME ANALYZED: 1319
TIME ANALYZED: 1511
TIME ANALYZED: 1515
TIME ANALYZED: 1556
TIME ANALYZED: 1632
TIME ANALYZED: 1923
TIME ANALYZED: 1951
TIME ANALYZED: 2026
TIME ANALYZED: 210
TIME ANALYZED: 2218
TIME ANALYZED: 2324
TIME ANALYZED: 2336
TIME ANALYZED: 319
TIME ANALYZED: 414
TIME ANALYZED: 425
TIME ANALYZED: 543
TIME ANALYZED: 602
TIME ANALYZED: 637
TIME ANALYZED: 654
TIME ANALYZED: 742
TIME ANALYZED: 820
TOTAL CK: 41 U/L (ref 20–180)
TOTAL CK: 42 U/L (ref 20–180)
TOTAL CK: 46 U/L (ref 20–180)
TOTAL CK: 51 U/L (ref 20–180)
TOTAL CK: 59 U/L (ref 20–180)
TOTAL CK: 61 U/L (ref 20–180)
TOTAL CK: 63 U/L (ref 20–180)
TOTAL PROTEIN: 4.9 G/DL (ref 6.4–8.3)
TOTAL PROTEIN: 5.2 G/DL (ref 6.4–8.3)
TOTAL PROTEIN: 5.3 G/DL (ref 6.4–8.3)
TOTAL PROTEIN: 5.4 G/DL (ref 6.4–8.3)
TOTAL PROTEIN: 5.5 G/DL (ref 6.4–8.3)
TOTAL PROTEIN: 5.6 G/DL (ref 6.4–8.3)
TOTAL PROTEIN: 5.7 G/DL (ref 6.4–8.3)
TOTAL PROTEIN: 5.9 G/DL (ref 6.4–8.3)
TOTAL PROTEIN: 6.1 G/DL (ref 6.4–8.3)
TRICYCLIC ANTIDEPRESSANTS SCREEN SERUM: NEGATIVE NG/ML
TRICYCLIC ANTIDEPRESSANTS SCREEN SERUM: NEGATIVE NG/ML
TROPONIN, HIGH SENSITIVITY: 10 NG/L (ref 0–9)
TROPONIN, HIGH SENSITIVITY: 13 NG/L (ref 0–9)
TROPONIN, HIGH SENSITIVITY: 14 NG/L (ref 0–9)
TROPONIN, HIGH SENSITIVITY: 14 NG/L (ref 0–9)
TROPONIN, HIGH SENSITIVITY: 15 NG/L (ref 0–9)
TROPONIN, HIGH SENSITIVITY: 17 NG/L (ref 0–9)
TROPONIN, HIGH SENSITIVITY: 19 NG/L (ref 0–9)
TROPONIN, HIGH SENSITIVITY: 33 NG/L (ref 0–9)
URINE CULTURE, ROUTINE: ABNORMAL
UROBILINOGEN, URINE: 0.2 E.U./DL
UROBILINOGEN, URINE: 1 E.U./DL
UROBILINOGEN, URINE: 1 E.U./DL
VT MECHANICAL: 350 ML
VT MECHANICAL: 500 ML
VT MECHANICAL: 500 ML
WBC # BLD: 12 E9/L (ref 4.5–11.5)
WBC # BLD: 14.2 E9/L (ref 4.5–11.5)
WBC # BLD: 14.8 E9/L (ref 4.5–11.5)
WBC # BLD: 15.3 E9/L (ref 4.5–11.5)
WBC # BLD: 17.1 E9/L (ref 4.5–11.5)
WBC # BLD: 17.3 E9/L (ref 4.5–11.5)
WBC # BLD: 20.9 E9/L (ref 4.5–11.5)
WBC # BLD: 21 E9/L (ref 4.5–11.5)
WBC # BLD: 21.6 E9/L (ref 4.5–11.5)
WBC # BLD: 22 E9/L (ref 4.5–11.5)
WBC # BLD: 22.5 E9/L (ref 4.5–11.5)
WBC # BLD: 23.3 E9/L (ref 4.5–11.5)
WBC # BLD: 25.1 E9/L (ref 4.5–11.5)
WBC # BLD: 26.7 E9/L (ref 4.5–11.5)
WBC # BLD: 27.5 E9/L (ref 4.5–11.5)
WBC UA: ABNORMAL /HPF (ref 0–5)
WBC UA: NORMAL /HPF (ref 0–5)

## 2022-01-01 PROCEDURE — 84484 ASSAY OF TROPONIN QUANT: CPT

## 2022-01-01 PROCEDURE — C9113 INJ PANTOPRAZOLE SODIUM, VIA: HCPCS | Performed by: INTERNAL MEDICINE

## 2022-01-01 PROCEDURE — 87206 SMEAR FLUORESCENT/ACID STAI: CPT

## 2022-01-01 PROCEDURE — 85018 HEMOGLOBIN: CPT

## 2022-01-01 PROCEDURE — 93005 ELECTROCARDIOGRAM TRACING: CPT | Performed by: INTERNAL MEDICINE

## 2022-01-01 PROCEDURE — 82330 ASSAY OF CALCIUM: CPT

## 2022-01-01 PROCEDURE — 87186 SC STD MICRODIL/AGAR DIL: CPT

## 2022-01-01 PROCEDURE — 2580000003 HC RX 258: Performed by: STUDENT IN AN ORGANIZED HEALTH CARE EDUCATION/TRAINING PROGRAM

## 2022-01-01 PROCEDURE — 2580000003 HC RX 258: Performed by: INTERNAL MEDICINE

## 2022-01-01 PROCEDURE — 94003 VENT MGMT INPAT SUBQ DAY: CPT

## 2022-01-01 PROCEDURE — 83930 ASSAY OF BLOOD OSMOLALITY: CPT

## 2022-01-01 PROCEDURE — 93312 ECHO TRANSESOPHAGEAL: CPT

## 2022-01-01 PROCEDURE — 2500000003 HC RX 250 WO HCPCS

## 2022-01-01 PROCEDURE — 6360000002 HC RX W HCPCS

## 2022-01-01 PROCEDURE — 6370000000 HC RX 637 (ALT 250 FOR IP): Performed by: INTERNAL MEDICINE

## 2022-01-01 PROCEDURE — 83605 ASSAY OF LACTIC ACID: CPT

## 2022-01-01 PROCEDURE — 36556 INSERT NON-TUNNEL CV CATH: CPT

## 2022-01-01 PROCEDURE — 82962 GLUCOSE BLOOD TEST: CPT

## 2022-01-01 PROCEDURE — 0BH17EZ INSERTION OF ENDOTRACHEAL AIRWAY INTO TRACHEA, VIA NATURAL OR ARTIFICIAL OPENING: ICD-10-PCS | Performed by: INTERNAL MEDICINE

## 2022-01-01 PROCEDURE — 36415 COLL VENOUS BLD VENIPUNCTURE: CPT

## 2022-01-01 PROCEDURE — 82550 ASSAY OF CK (CPK): CPT

## 2022-01-01 PROCEDURE — 99232 SBSQ HOSP IP/OBS MODERATE 35: CPT | Performed by: CLINICAL NURSE SPECIALIST

## 2022-01-01 PROCEDURE — 84295 ASSAY OF SERUM SODIUM: CPT

## 2022-01-01 PROCEDURE — 84100 ASSAY OF PHOSPHORUS: CPT

## 2022-01-01 PROCEDURE — 82805 BLOOD GASES W/O2 SATURATION: CPT

## 2022-01-01 PROCEDURE — 2500000003 HC RX 250 WO HCPCS: Performed by: INTERNAL MEDICINE

## 2022-01-01 PROCEDURE — 36556 INSERT NON-TUNNEL CV CATH: CPT | Performed by: INTERNAL MEDICINE

## 2022-01-01 PROCEDURE — 99233 SBSQ HOSP IP/OBS HIGH 50: CPT | Performed by: CLINICAL NURSE SPECIALIST

## 2022-01-01 PROCEDURE — 80179 DRUG ASSAY SALICYLATE: CPT

## 2022-01-01 PROCEDURE — 6360000002 HC RX W HCPCS: Performed by: INTERNAL MEDICINE

## 2022-01-01 PROCEDURE — 96365 THER/PROPH/DIAG IV INF INIT: CPT

## 2022-01-01 PROCEDURE — 85610 PROTHROMBIN TIME: CPT

## 2022-01-01 PROCEDURE — 99223 1ST HOSP IP/OBS HIGH 75: CPT | Performed by: INTERNAL MEDICINE

## 2022-01-01 PROCEDURE — 80076 HEPATIC FUNCTION PANEL: CPT

## 2022-01-01 PROCEDURE — 85730 THROMBOPLASTIN TIME PARTIAL: CPT

## 2022-01-01 PROCEDURE — 3700000000 HC ANESTHESIA ATTENDED CARE

## 2022-01-01 PROCEDURE — 85025 COMPLETE CBC W/AUTO DIFF WBC: CPT

## 2022-01-01 PROCEDURE — 6370000000 HC RX 637 (ALT 250 FOR IP)

## 2022-01-01 PROCEDURE — 6360000002 HC RX W HCPCS: Performed by: EMERGENCY MEDICINE

## 2022-01-01 PROCEDURE — 71045 X-RAY EXAM CHEST 1 VIEW: CPT

## 2022-01-01 PROCEDURE — 93321 DOPPLER ECHO F-UP/LMTD STD: CPT

## 2022-01-01 PROCEDURE — 87116 MYCOBACTERIA CULTURE: CPT

## 2022-01-01 PROCEDURE — 80307 DRUG TEST PRSMV CHEM ANLYZR: CPT

## 2022-01-01 PROCEDURE — 87077 CULTURE AEROBIC IDENTIFY: CPT

## 2022-01-01 PROCEDURE — 80053 COMPREHEN METABOLIC PANEL: CPT

## 2022-01-01 PROCEDURE — 37799 UNLISTED PX VASCULAR SURGERY: CPT

## 2022-01-01 PROCEDURE — 81001 URINALYSIS AUTO W/SCOPE: CPT

## 2022-01-01 PROCEDURE — 93005 ELECTROCARDIOGRAM TRACING: CPT

## 2022-01-01 PROCEDURE — 2000000000 HC ICU R&B

## 2022-01-01 PROCEDURE — 84300 ASSAY OF URINE SODIUM: CPT

## 2022-01-01 PROCEDURE — 83690 ASSAY OF LIPASE: CPT

## 2022-01-01 PROCEDURE — 94640 AIRWAY INHALATION TREATMENT: CPT

## 2022-01-01 PROCEDURE — 86706 HEP B SURFACE ANTIBODY: CPT

## 2022-01-01 PROCEDURE — 86905 BLOOD TYPING RBC ANTIGENS: CPT

## 2022-01-01 PROCEDURE — 02HV33Z INSERTION OF INFUSION DEVICE INTO SUPERIOR VENA CAVA, PERCUTANEOUS APPROACH: ICD-10-PCS | Performed by: INTERNAL MEDICINE

## 2022-01-01 PROCEDURE — 87070 CULTURE OTHR SPECIMN AEROBIC: CPT

## 2022-01-01 PROCEDURE — 96376 TX/PRO/DX INJ SAME DRUG ADON: CPT

## 2022-01-01 PROCEDURE — 99222 1ST HOSP IP/OBS MODERATE 55: CPT | Performed by: NURSE PRACTITIONER

## 2022-01-01 PROCEDURE — 2500000003 HC RX 250 WO HCPCS: Performed by: STUDENT IN AN ORGANIZED HEALTH CARE EDUCATION/TRAINING PROGRAM

## 2022-01-01 PROCEDURE — 83735 ASSAY OF MAGNESIUM: CPT

## 2022-01-01 PROCEDURE — 99232 SBSQ HOSP IP/OBS MODERATE 35: CPT | Performed by: INTERNAL MEDICINE

## 2022-01-01 PROCEDURE — 80143 DRUG ASSAY ACETAMINOPHEN: CPT

## 2022-01-01 PROCEDURE — 87205 SMEAR GRAM STAIN: CPT

## 2022-01-01 PROCEDURE — 80048 BASIC METABOLIC PNL TOTAL CA: CPT

## 2022-01-01 PROCEDURE — 99233 SBSQ HOSP IP/OBS HIGH 50: CPT | Performed by: INTERNAL MEDICINE

## 2022-01-01 PROCEDURE — 83036 HEMOGLOBIN GLYCOSYLATED A1C: CPT

## 2022-01-01 PROCEDURE — 86850 RBC ANTIBODY SCREEN: CPT

## 2022-01-01 PROCEDURE — 99231 SBSQ HOSP IP/OBS SF/LOW 25: CPT | Performed by: NURSE PRACTITIONER

## 2022-01-01 PROCEDURE — 87150 DNA/RNA AMPLIFIED PROBE: CPT

## 2022-01-01 PROCEDURE — 2580000003 HC RX 258

## 2022-01-01 PROCEDURE — 82803 BLOOD GASES ANY COMBINATION: CPT

## 2022-01-01 PROCEDURE — 86901 BLOOD TYPING SEROLOGIC RH(D): CPT

## 2022-01-01 PROCEDURE — 82077 ASSAY SPEC XCP UR&BREATH IA: CPT

## 2022-01-01 PROCEDURE — 71275 CT ANGIOGRAPHY CHEST: CPT

## 2022-01-01 PROCEDURE — 6360000004 HC RX CONTRAST MEDICATION: Performed by: RADIOLOGY

## 2022-01-01 PROCEDURE — 6370000000 HC RX 637 (ALT 250 FOR IP): Performed by: STUDENT IN AN ORGANIZED HEALTH CARE EDUCATION/TRAINING PROGRAM

## 2022-01-01 PROCEDURE — 70450 CT HEAD/BRAIN W/O DYE: CPT

## 2022-01-01 PROCEDURE — 2500000003 HC RX 250 WO HCPCS: Performed by: EMERGENCY MEDICINE

## 2022-01-01 PROCEDURE — 87015 SPECIMEN INFECT AGNT CONCNTJ: CPT

## 2022-01-01 PROCEDURE — 5A1955Z RESPIRATORY VENTILATION, GREATER THAN 96 CONSECUTIVE HOURS: ICD-10-PCS | Performed by: INTERNAL MEDICINE

## 2022-01-01 PROCEDURE — 86900 BLOOD TYPING SEROLOGIC ABO: CPT

## 2022-01-01 PROCEDURE — 86703 HIV-1/HIV-2 1 RESULT ANTBDY: CPT

## 2022-01-01 PROCEDURE — U0003 INFECTIOUS AGENT DETECTION BY NUCLEIC ACID (DNA OR RNA); SEVERE ACUTE RESPIRATORY SYNDROME CORONAVIRUS 2 (SARS-COV-2) (CORONAVIRUS DISEASE [COVID-19]), AMPLIFIED PROBE TECHNIQUE, MAKING USE OF HIGH THROUGHPUT TECHNOLOGIES AS DESCRIBED BY CMS-2020-01-R: HCPCS

## 2022-01-01 PROCEDURE — 2709999900 HC NON-CHARGEABLE SUPPLY: Performed by: INTERNAL MEDICINE

## 2022-01-01 PROCEDURE — 93325 DOPPLER ECHO COLOR FLOW MAPG: CPT

## 2022-01-01 PROCEDURE — 84133 ASSAY OF URINE POTASSIUM: CPT

## 2022-01-01 PROCEDURE — 84132 ASSAY OF SERUM POTASSIUM: CPT

## 2022-01-01 PROCEDURE — U0005 INFEC AGEN DETEC AMPLI PROBE: HCPCS

## 2022-01-01 PROCEDURE — 3600000014 HC SURGERY LEVEL 4 ADDTL 15MIN

## 2022-01-01 PROCEDURE — 6360000002 HC RX W HCPCS: Performed by: STUDENT IN AN ORGANIZED HEALTH CARE EDUCATION/TRAINING PROGRAM

## 2022-01-01 PROCEDURE — 3600000004 HC SURGERY LEVEL 4 BASE

## 2022-01-01 PROCEDURE — 83935 ASSAY OF URINE OSMOLALITY: CPT

## 2022-01-01 PROCEDURE — 3609027000 HC BRONCHOSCOPY: Performed by: INTERNAL MEDICINE

## 2022-01-01 PROCEDURE — 82436 ASSAY OF URINE CHLORIDE: CPT

## 2022-01-01 PROCEDURE — 94667 MNPJ CHEST WALL 1ST: CPT

## 2022-01-01 PROCEDURE — 85014 HEMATOCRIT: CPT

## 2022-01-01 PROCEDURE — 96375 TX/PRO/DX INJ NEW DRUG ADDON: CPT

## 2022-01-01 PROCEDURE — 84703 CHORIONIC GONADOTROPIN ASSAY: CPT

## 2022-01-01 PROCEDURE — 99285 EMERGENCY DEPT VISIT HI MDM: CPT

## 2022-01-01 PROCEDURE — 94669 MECHANICAL CHEST WALL OSCILL: CPT

## 2022-01-01 PROCEDURE — 87081 CULTURE SCREEN ONLY: CPT

## 2022-01-01 PROCEDURE — 82150 ASSAY OF AMYLASE: CPT

## 2022-01-01 PROCEDURE — 84145 PROCALCITONIN (PCT): CPT

## 2022-01-01 PROCEDURE — 94002 VENT MGMT INPAT INIT DAY: CPT

## 2022-01-01 PROCEDURE — 87040 BLOOD CULTURE FOR BACTERIA: CPT

## 2022-01-01 PROCEDURE — 94668 MNPJ CHEST WALL SBSQ: CPT

## 2022-01-01 PROCEDURE — 3700000001 HC ADD 15 MINUTES (ANESTHESIA)

## 2022-01-01 PROCEDURE — 93312 ECHO TRANSESOPHAGEAL: CPT | Performed by: INTERNAL MEDICINE

## 2022-01-01 PROCEDURE — 70551 MRI BRAIN STEM W/O DYE: CPT

## 2022-01-01 PROCEDURE — 82570 ASSAY OF URINE CREATININE: CPT

## 2022-01-01 PROCEDURE — 94664 DEMO&/EVAL PT USE INHALER: CPT

## 2022-01-01 PROCEDURE — 93325 DOPPLER ECHO COLOR FLOW MAPG: CPT | Performed by: INTERNAL MEDICINE

## 2022-01-01 PROCEDURE — 80074 ACUTE HEPATITIS PANEL: CPT

## 2022-01-01 PROCEDURE — 51702 INSERT TEMP BLADDER CATH: CPT

## 2022-01-01 PROCEDURE — 2709999900 HC NON-CHARGEABLE SUPPLY

## 2022-01-01 PROCEDURE — 87088 URINE BACTERIA CULTURE: CPT

## 2022-01-01 PROCEDURE — 74018 RADEX ABDOMEN 1 VIEW: CPT

## 2022-01-01 PROCEDURE — 87102 FUNGUS ISOLATION CULTURE: CPT

## 2022-01-01 PROCEDURE — 93320 DOPPLER ECHO COMPLETE: CPT | Performed by: INTERNAL MEDICINE

## 2022-01-01 PROCEDURE — 2580000003 HC RX 258: Performed by: EMERGENCY MEDICINE

## 2022-01-01 PROCEDURE — 99999 PR OFFICE/OUTPT VISIT,PROCEDURE ONLY: CPT | Performed by: INTERNAL MEDICINE

## 2022-01-01 RX ORDER — SODIUM CHLORIDE 0.9 % (FLUSH) 0.9 %
5-40 SYRINGE (ML) INJECTION EVERY 12 HOURS SCHEDULED
Status: DISCONTINUED | OUTPATIENT
Start: 2022-01-01 | End: 2022-01-01 | Stop reason: ALTCHOICE

## 2022-01-01 RX ORDER — HEPARIN SODIUM 1000 [USP'U]/ML
INJECTION, SOLUTION INTRAVENOUS; SUBCUTANEOUS PRN
Status: DISCONTINUED | OUTPATIENT
Start: 2022-01-01 | End: 2022-01-01 | Stop reason: SDUPTHER

## 2022-01-01 RX ORDER — SODIUM CHLORIDE 0.9 % (FLUSH) 0.9 %
10 SYRINGE (ML) INJECTION PRN
Status: DISCONTINUED | OUTPATIENT
Start: 2022-01-01 | End: 2022-01-01 | Stop reason: HOSPADM

## 2022-01-01 RX ORDER — 3% SODIUM CHLORIDE 3 G/100ML
25 INJECTION, SOLUTION INTRAVENOUS CONTINUOUS
Status: DISCONTINUED | OUTPATIENT
Start: 2022-01-01 | End: 2022-01-01

## 2022-01-01 RX ORDER — MAGNESIUM SULFATE IN WATER 40 MG/ML
2000 INJECTION, SOLUTION INTRAVENOUS ONCE
Status: COMPLETED | OUTPATIENT
Start: 2022-01-01 | End: 2022-01-01

## 2022-01-01 RX ORDER — ENOXAPARIN SODIUM 100 MG/ML
40 INJECTION SUBCUTANEOUS DAILY
Status: DISCONTINUED | OUTPATIENT
Start: 2022-01-01 | End: 2022-01-01

## 2022-01-01 RX ORDER — SODIUM CHLORIDE 450 MG/100ML
INJECTION, SOLUTION INTRAVENOUS ONCE
Status: COMPLETED | OUTPATIENT
Start: 2022-01-01 | End: 2022-01-01

## 2022-01-01 RX ORDER — ENOXAPARIN SODIUM 100 MG/ML
30 INJECTION SUBCUTANEOUS DAILY
Status: DISCONTINUED | OUTPATIENT
Start: 2022-01-01 | End: 2022-01-01

## 2022-01-01 RX ORDER — SODIUM CHLORIDE 0.9 % (FLUSH) 0.9 %
5-40 SYRINGE (ML) INJECTION PRN
Status: DISCONTINUED | OUTPATIENT
Start: 2022-01-01 | End: 2022-01-01 | Stop reason: ALTCHOICE

## 2022-01-01 RX ORDER — ACETAMINOPHEN 325 MG/1
650 TABLET ORAL EVERY 6 HOURS PRN
Status: DISCONTINUED | OUTPATIENT
Start: 2022-01-01 | End: 2022-01-01 | Stop reason: ALTCHOICE

## 2022-01-01 RX ORDER — DEXTROSE MONOHYDRATE 25 G/50ML
12.5 INJECTION, SOLUTION INTRAVENOUS PRN
Status: DISCONTINUED | OUTPATIENT
Start: 2022-01-01 | End: 2022-01-01 | Stop reason: ALTCHOICE

## 2022-01-01 RX ORDER — ACETAMINOPHEN 650 MG/1
650 SUPPOSITORY RECTAL EVERY 6 HOURS PRN
Status: DISCONTINUED | OUTPATIENT
Start: 2022-01-01 | End: 2022-01-01 | Stop reason: ALTCHOICE

## 2022-01-01 RX ORDER — DESMOPRESSIN ACETATE 4 UG/ML
1 INJECTION, SOLUTION INTRAVENOUS; SUBCUTANEOUS ONCE
Status: COMPLETED | OUTPATIENT
Start: 2022-01-01 | End: 2022-01-01

## 2022-01-01 RX ORDER — THIAMINE HYDROCHLORIDE 100 MG/ML
200 INJECTION, SOLUTION INTRAMUSCULAR; INTRAVENOUS DAILY
Status: COMPLETED | OUTPATIENT
Start: 2022-01-01 | End: 2022-01-01

## 2022-01-01 RX ORDER — MANNITOL 20 G/100ML
1 INJECTION, SOLUTION INTRAVENOUS ONCE
Status: COMPLETED | OUTPATIENT
Start: 2022-01-01 | End: 2022-01-01

## 2022-01-01 RX ORDER — ONDANSETRON 2 MG/ML
4 INJECTION INTRAMUSCULAR; INTRAVENOUS EVERY 6 HOURS PRN
Status: DISCONTINUED | OUTPATIENT
Start: 2022-01-01 | End: 2022-01-01

## 2022-01-01 RX ORDER — POTASSIUM CHLORIDE 29.8 MG/ML
40 INJECTION INTRAVENOUS ONCE
Status: COMPLETED | OUTPATIENT
Start: 2022-01-01 | End: 2022-01-01

## 2022-01-01 RX ORDER — FUROSEMIDE 10 MG/ML
INJECTION INTRAMUSCULAR; INTRAVENOUS
Status: COMPLETED
Start: 2022-01-01 | End: 2022-01-01

## 2022-01-01 RX ORDER — POLYETHYLENE GLYCOL 3350 17 G/17G
17 POWDER, FOR SOLUTION ORAL DAILY PRN
Status: DISCONTINUED | OUTPATIENT
Start: 2022-01-01 | End: 2022-01-01 | Stop reason: ALTCHOICE

## 2022-01-01 RX ORDER — DEXTROSE MONOHYDRATE 50 MG/ML
INJECTION, SOLUTION INTRAVENOUS CONTINUOUS
Status: DISCONTINUED | OUTPATIENT
Start: 2022-01-01 | End: 2022-01-01 | Stop reason: ALTCHOICE

## 2022-01-01 RX ORDER — 0.9 % SODIUM CHLORIDE 0.9 %
1000 INTRAVENOUS SOLUTION INTRAVENOUS ONCE
Status: COMPLETED | OUTPATIENT
Start: 2022-01-01 | End: 2022-01-01

## 2022-01-01 RX ORDER — VECURONIUM BROMIDE 1 MG/ML
5 INJECTION, POWDER, LYOPHILIZED, FOR SOLUTION INTRAVENOUS ONCE
Status: COMPLETED | OUTPATIENT
Start: 2022-01-01 | End: 2022-01-01

## 2022-01-01 RX ORDER — HYDRALAZINE HYDROCHLORIDE 20 MG/ML
10 INJECTION INTRAMUSCULAR; INTRAVENOUS ONCE
Status: COMPLETED | OUTPATIENT
Start: 2022-01-01 | End: 2022-01-01

## 2022-01-01 RX ORDER — DEXTROSE MONOHYDRATE 25 G/50ML
25 INJECTION, SOLUTION INTRAVENOUS PRN
Status: DISCONTINUED | OUTPATIENT
Start: 2022-01-01 | End: 2022-01-01 | Stop reason: HOSPADM

## 2022-01-01 RX ORDER — SODIUM CHLORIDE 9 MG/ML
INJECTION, SOLUTION INTRAVENOUS PRN
Status: DISCONTINUED | OUTPATIENT
Start: 2022-01-01 | End: 2022-01-01 | Stop reason: ALTCHOICE

## 2022-01-01 RX ORDER — FENTANYL CITRATE 50 UG/ML
100 INJECTION, SOLUTION INTRAMUSCULAR; INTRAVENOUS
Status: DISCONTINUED | OUTPATIENT
Start: 2022-01-01 | End: 2022-01-01 | Stop reason: ALTCHOICE

## 2022-01-01 RX ORDER — ROCURONIUM BROMIDE 10 MG/ML
0.6 INJECTION, SOLUTION INTRAVENOUS ONCE
Status: COMPLETED | OUTPATIENT
Start: 2022-01-01 | End: 2022-01-01

## 2022-01-01 RX ORDER — ONDANSETRON 4 MG/1
4 TABLET, ORALLY DISINTEGRATING ORAL EVERY 8 HOURS PRN
Status: DISCONTINUED | OUTPATIENT
Start: 2022-01-01 | End: 2022-01-01

## 2022-01-01 RX ORDER — POTASSIUM CHLORIDE 29.8 MG/ML
40 INJECTION INTRAVENOUS EVERY 4 HOURS
Status: DISCONTINUED | OUTPATIENT
Start: 2022-01-01 | End: 2022-01-01

## 2022-01-01 RX ORDER — VECURONIUM BROMIDE 1 MG/ML
INJECTION, POWDER, LYOPHILIZED, FOR SOLUTION INTRAVENOUS PRN
Status: DISCONTINUED | OUTPATIENT
Start: 2022-01-01 | End: 2022-01-01 | Stop reason: SDUPTHER

## 2022-01-01 RX ORDER — DEXTROSE MONOHYDRATE 50 MG/ML
100 INJECTION, SOLUTION INTRAVENOUS PRN
Status: DISCONTINUED | OUTPATIENT
Start: 2022-01-01 | End: 2022-01-01 | Stop reason: ALTCHOICE

## 2022-01-01 RX ORDER — THIAMINE HYDROCHLORIDE 100 MG/ML
250 INJECTION, SOLUTION INTRAMUSCULAR; INTRAVENOUS DAILY
Status: DISCONTINUED | OUTPATIENT
Start: 2022-01-01 | End: 2022-01-01

## 2022-01-01 RX ORDER — HYDRALAZINE HYDROCHLORIDE 20 MG/ML
10 INJECTION INTRAMUSCULAR; INTRAVENOUS EVERY 4 HOURS PRN
Status: DISCONTINUED | OUTPATIENT
Start: 2022-01-01 | End: 2022-01-01 | Stop reason: ALTCHOICE

## 2022-01-01 RX ORDER — CHLORHEXIDINE GLUCONATE 0.12 MG/ML
15 RINSE ORAL 2 TIMES DAILY
Status: DISCONTINUED | OUTPATIENT
Start: 2022-01-01 | End: 2022-01-01 | Stop reason: ALTCHOICE

## 2022-01-01 RX ORDER — POTASSIUM CHLORIDE 7.45 MG/ML
10 INJECTION INTRAVENOUS
Status: COMPLETED | OUTPATIENT
Start: 2022-01-01 | End: 2022-01-01

## 2022-01-01 RX ORDER — MIDAZOLAM HYDROCHLORIDE 2 MG/2ML
2 INJECTION, SOLUTION INTRAMUSCULAR; INTRAVENOUS ONCE
Status: COMPLETED | OUTPATIENT
Start: 2022-01-01 | End: 2022-01-01

## 2022-01-01 RX ORDER — SODIUM CHLORIDE 9 MG/ML
INJECTION, SOLUTION INTRAVENOUS CONTINUOUS PRN
Status: DISCONTINUED | OUTPATIENT
Start: 2022-01-01 | End: 2022-01-01 | Stop reason: SDUPTHER

## 2022-01-01 RX ORDER — SODIUM CHLORIDE, SODIUM LACTATE, POTASSIUM CHLORIDE, AND CALCIUM CHLORIDE .6; .31; .03; .02 G/100ML; G/100ML; G/100ML; G/100ML
250 INJECTION, SOLUTION INTRAVENOUS ONCE
Status: COMPLETED | OUTPATIENT
Start: 2022-01-01 | End: 2022-01-01

## 2022-01-01 RX ORDER — FUROSEMIDE 10 MG/ML
20 INJECTION INTRAMUSCULAR; INTRAVENOUS ONCE
Status: COMPLETED | OUTPATIENT
Start: 2022-01-01 | End: 2022-01-01

## 2022-01-01 RX ORDER — PANTOPRAZOLE SODIUM 40 MG/10ML
40 INJECTION, POWDER, LYOPHILIZED, FOR SOLUTION INTRAVENOUS 2 TIMES DAILY
Status: DISCONTINUED | OUTPATIENT
Start: 2022-01-01 | End: 2022-01-01 | Stop reason: ALTCHOICE

## 2022-01-01 RX ORDER — ROCURONIUM BROMIDE 10 MG/ML
0.6 INJECTION, SOLUTION INTRAVENOUS ONCE
Status: DISCONTINUED | OUTPATIENT
Start: 2022-01-01 | End: 2022-01-01

## 2022-01-01 RX ORDER — PHENYLEPHRINE HCL IN 0.9% NACL 1 MG/10 ML
SYRINGE (ML) INTRAVENOUS PRN
Status: DISCONTINUED | OUTPATIENT
Start: 2022-01-01 | End: 2022-01-01 | Stop reason: SDUPTHER

## 2022-01-01 RX ORDER — SODIUM CHLORIDE 0.9 % (FLUSH) 0.9 %
10 SYRINGE (ML) INJECTION EVERY 12 HOURS SCHEDULED
Status: DISCONTINUED | OUTPATIENT
Start: 2022-01-01 | End: 2022-01-01 | Stop reason: HOSPADM

## 2022-01-01 RX ORDER — PANTOPRAZOLE SODIUM 40 MG/10ML
40 INJECTION, POWDER, LYOPHILIZED, FOR SOLUTION INTRAVENOUS
Status: DISCONTINUED | OUTPATIENT
Start: 2022-01-01 | End: 2022-01-01

## 2022-01-01 RX ORDER — DESMOPRESSIN ACETATE 4 UG/ML
1 INJECTION, SOLUTION INTRAVENOUS; SUBCUTANEOUS 3 TIMES DAILY
Status: DISCONTINUED | OUTPATIENT
Start: 2022-01-01 | End: 2022-01-01 | Stop reason: ALTCHOICE

## 2022-01-01 RX ORDER — SODIUM CHLORIDE 450 MG/100ML
INJECTION, SOLUTION INTRAVENOUS CONTINUOUS
Status: DISCONTINUED | OUTPATIENT
Start: 2022-01-01 | End: 2022-01-01 | Stop reason: HOSPADM

## 2022-01-01 RX ORDER — POTASSIUM CHLORIDE 29.8 MG/ML
20 INJECTION INTRAVENOUS ONCE
Status: COMPLETED | OUTPATIENT
Start: 2022-01-01 | End: 2022-01-01

## 2022-01-01 RX ORDER — ALBUTEROL SULFATE 2.5 MG/3ML
2.5 SOLUTION RESPIRATORY (INHALATION) EVERY 4 HOURS
Status: DISCONTINUED | OUTPATIENT
Start: 2022-01-01 | End: 2022-01-01 | Stop reason: HOSPADM

## 2022-01-01 RX ORDER — DESMOPRESSIN ACETATE 4 UG/ML
1 INJECTION, SOLUTION INTRAVENOUS; SUBCUTANEOUS 2 TIMES DAILY
Status: DISCONTINUED | OUTPATIENT
Start: 2022-01-01 | End: 2022-01-01

## 2022-01-01 RX ORDER — 0.9 % SODIUM CHLORIDE 0.9 %
250 INTRAVENOUS SOLUTION INTRAVENOUS ONCE
Status: DISCONTINUED | OUTPATIENT
Start: 2022-01-01 | End: 2022-01-01

## 2022-01-01 RX ORDER — LEVETIRACETAM 5 MG/ML
500 INJECTION INTRAVASCULAR EVERY 12 HOURS
Status: DISCONTINUED | OUTPATIENT
Start: 2022-01-01 | End: 2022-01-01 | Stop reason: ALTCHOICE

## 2022-01-01 RX ORDER — 0.9 % SODIUM CHLORIDE 0.9 %
500 INTRAVENOUS SOLUTION INTRAVENOUS ONCE
Status: DISCONTINUED | OUTPATIENT
Start: 2022-01-01 | End: 2022-01-01

## 2022-01-01 RX ORDER — SODIUM CHLORIDE 9 MG/ML
INJECTION, SOLUTION INTRAVENOUS PRN
Status: DISCONTINUED | OUTPATIENT
Start: 2022-01-01 | End: 2022-01-01 | Stop reason: HOSPADM

## 2022-01-01 RX ORDER — DEXTROSE MONOHYDRATE 50 MG/ML
100 INJECTION, SOLUTION INTRAVENOUS PRN
Status: DISCONTINUED | OUTPATIENT
Start: 2022-01-01 | End: 2022-01-01 | Stop reason: HOSPADM

## 2022-01-01 RX ADMIN — POTASSIUM CHLORIDE 40 MEQ: 400 INJECTION, SOLUTION INTRAVENOUS at 21:01

## 2022-01-01 RX ADMIN — 0.12% CHLORHEXIDINE GLUCONATE 15 ML: 1.2 RINSE ORAL at 08:55

## 2022-01-01 RX ADMIN — 0.12% CHLORHEXIDINE GLUCONATE 15 ML: 1.2 RINSE ORAL at 09:41

## 2022-01-01 RX ADMIN — Medication 10 MEQ: at 03:25

## 2022-01-01 RX ADMIN — SODIUM CHLORIDE: 4.5 INJECTION, SOLUTION INTRAVENOUS at 20:41

## 2022-01-01 RX ADMIN — ALBUTEROL SULFATE 2.5 MG: 2.5 SOLUTION RESPIRATORY (INHALATION) at 16:43

## 2022-01-01 RX ADMIN — ALBUTEROL SULFATE 2.5 MG: 2.5 SOLUTION RESPIRATORY (INHALATION) at 13:34

## 2022-01-01 RX ADMIN — METHYLPREDNISOLONE SODIUM SUCCINATE 1000 MG: 1 INJECTION, POWDER, LYOPHILIZED, FOR SOLUTION INTRAMUSCULAR; INTRAVENOUS at 03:40

## 2022-01-01 RX ADMIN — THIAMINE HYDROCHLORIDE 200 MG: 100 INJECTION, SOLUTION INTRAMUSCULAR; INTRAVENOUS at 09:10

## 2022-01-01 RX ADMIN — IOPAMIDOL 75 ML: 755 INJECTION, SOLUTION INTRAVENOUS at 23:19

## 2022-01-01 RX ADMIN — LEVETIRACETAM 500 MG: 5 INJECTION INTRAVENOUS at 09:18

## 2022-01-01 RX ADMIN — 0.12% CHLORHEXIDINE GLUCONATE 15 ML: 1.2 RINSE ORAL at 09:10

## 2022-01-01 RX ADMIN — SODIUM CHLORIDE 25 ML/HR: 3 INJECTION, SOLUTION INTRAVENOUS at 10:10

## 2022-01-01 RX ADMIN — ALBUTEROL SULFATE 2.5 MG: 2.5 SOLUTION RESPIRATORY (INHALATION) at 04:49

## 2022-01-01 RX ADMIN — PIPERACILLIN AND TAZOBACTAM 4500 MG: 4; .5 INJECTION, POWDER, LYOPHILIZED, FOR SOLUTION INTRAVENOUS at 09:28

## 2022-01-01 RX ADMIN — PIPERACILLIN AND TAZOBACTAM 4500 MG: 4; .5 INJECTION, POWDER, LYOPHILIZED, FOR SOLUTION INTRAVENOUS at 01:08

## 2022-01-01 RX ADMIN — MUPIROCIN: 20 OINTMENT TOPICAL at 09:10

## 2022-01-01 RX ADMIN — ACETAMINOPHEN 650 MG: 650 SUPPOSITORY RECTAL at 00:54

## 2022-01-01 RX ADMIN — PANTOPRAZOLE SODIUM 40 MG: 40 INJECTION, POWDER, FOR SOLUTION INTRAVENOUS at 09:39

## 2022-01-01 RX ADMIN — THIAMINE HYDROCHLORIDE 200 MG: 100 INJECTION, SOLUTION INTRAMUSCULAR; INTRAVENOUS at 09:40

## 2022-01-01 RX ADMIN — SODIUM CHLORIDE, PRESERVATIVE FREE 10 ML: 5 INJECTION INTRAVENOUS at 08:41

## 2022-01-01 RX ADMIN — MUPIROCIN: 20 OINTMENT TOPICAL at 20:31

## 2022-01-01 RX ADMIN — METHYLPREDNISOLONE SODIUM SUCCINATE 1000 MG: 1 INJECTION INTRAMUSCULAR; INTRAVENOUS at 21:01

## 2022-01-01 RX ADMIN — Medication 1500 MG: at 09:20

## 2022-01-01 RX ADMIN — ROCURONIUM BROMIDE 45 MG: 10 INJECTION, SOLUTION INTRAVENOUS at 04:59

## 2022-01-01 RX ADMIN — CEFEPIME HYDROCHLORIDE 2000 MG: 2 INJECTION, POWDER, FOR SOLUTION INTRAVENOUS at 20:47

## 2022-01-01 RX ADMIN — Medication 100 MCG/HR: at 05:08

## 2022-01-01 RX ADMIN — VECURONIUM BROMIDE 5 MG: 1 INJECTION, POWDER, LYOPHILIZED, FOR SOLUTION INTRAVENOUS at 01:34

## 2022-01-01 RX ADMIN — HYDRALAZINE HYDROCHLORIDE 10 MG: 20 INJECTION INTRAMUSCULAR; INTRAVENOUS at 14:35

## 2022-01-01 RX ADMIN — METHYLPREDNISOLONE SODIUM SUCCINATE 1000 MG: 1 INJECTION, POWDER, LYOPHILIZED, FOR SOLUTION INTRAMUSCULAR; INTRAVENOUS at 05:00

## 2022-01-01 RX ADMIN — ACETAMINOPHEN 650 MG: 650 SUPPOSITORY RECTAL at 06:39

## 2022-01-01 RX ADMIN — ALBUTEROL SULFATE 2.5 MG: 2.5 SOLUTION RESPIRATORY (INHALATION) at 00:03

## 2022-01-01 RX ADMIN — MAGNESIUM SULFATE HEPTAHYDRATE 2000 MG: 2 INJECTION, SOLUTION INTRAVENOUS at 00:53

## 2022-01-01 RX ADMIN — METHYLPREDNISOLONE SODIUM SUCCINATE 1000 MG: 1 INJECTION, POWDER, LYOPHILIZED, FOR SOLUTION INTRAMUSCULAR; INTRAVENOUS at 04:16

## 2022-01-01 RX ADMIN — CEFEPIME HYDROCHLORIDE 2000 MG: 2 INJECTION, POWDER, FOR SOLUTION INTRAVENOUS at 08:06

## 2022-01-01 RX ADMIN — SODIUM CHLORIDE 0.6 UNITS/HR: 9 INJECTION, SOLUTION INTRAVENOUS at 18:12

## 2022-01-01 RX ADMIN — POTASSIUM CHLORIDE 20 MEQ: 29.8 INJECTION, SOLUTION INTRAVENOUS at 19:01

## 2022-01-01 RX ADMIN — SODIUM CHLORIDE: 4.5 INJECTION, SOLUTION INTRAVENOUS at 21:21

## 2022-01-01 RX ADMIN — SODIUM CHLORIDE, PRESERVATIVE FREE 10 ML: 5 INJECTION INTRAVENOUS at 21:27

## 2022-01-01 RX ADMIN — CEFEPIME HYDROCHLORIDE 2000 MG: 2 INJECTION, POWDER, FOR SOLUTION INTRAVENOUS at 20:04

## 2022-01-01 RX ADMIN — VASOPRESSIN 0 UNITS/MIN: 20 INJECTION INTRAVENOUS at 18:14

## 2022-01-01 RX ADMIN — LEVETIRACETAM 500 MG: 5 INJECTION INTRAVENOUS at 20:27

## 2022-01-01 RX ADMIN — ALBUTEROL SULFATE 2.5 MG: 2.5 SOLUTION RESPIRATORY (INHALATION) at 00:41

## 2022-01-01 RX ADMIN — THIAMINE HYDROCHLORIDE 250 MG: 100 INJECTION, SOLUTION INTRAMUSCULAR; INTRAVENOUS at 13:17

## 2022-01-01 RX ADMIN — VASOPRESSIN 100 ML/HR: 20 INJECTION, SOLUTION INTRAVENOUS at 12:51

## 2022-01-01 RX ADMIN — 0.12% CHLORHEXIDINE GLUCONATE 15 ML: 1.2 RINSE ORAL at 21:47

## 2022-01-01 RX ADMIN — DESMOPRESSIN ACETATE 1 MCG: 4 SOLUTION INTRAVENOUS at 20:06

## 2022-01-01 RX ADMIN — WATER 5 ML: 1 INJECTION, SOLUTION INTRAMUSCULAR; INTRAVENOUS; SUBCUTANEOUS at 23:46

## 2022-01-01 RX ADMIN — ALBUTEROL SULFATE 2.5 MG: 2.5 SOLUTION RESPIRATORY (INHALATION) at 12:59

## 2022-01-01 RX ADMIN — SODIUM CHLORIDE: 9 INJECTION, SOLUTION INTRAVENOUS at 09:47

## 2022-01-01 RX ADMIN — PIPERACILLIN AND TAZOBACTAM 4500 MG: 4; .5 INJECTION, POWDER, LYOPHILIZED, FOR SOLUTION INTRAVENOUS at 00:58

## 2022-01-01 RX ADMIN — SODIUM CHLORIDE 1000 ML: 9 INJECTION, SOLUTION INTRAVENOUS at 22:11

## 2022-01-01 RX ADMIN — ALBUTEROL SULFATE 2.5 MG: 2.5 SOLUTION RESPIRATORY (INHALATION) at 21:31

## 2022-01-01 RX ADMIN — LEVETIRACETAM 500 MG: 5 INJECTION INTRAVENOUS at 21:26

## 2022-01-01 RX ADMIN — 0.12% CHLORHEXIDINE GLUCONATE 15 ML: 1.2 RINSE ORAL at 21:24

## 2022-01-01 RX ADMIN — CEFEPIME HYDROCHLORIDE 2000 MG: 2 INJECTION, POWDER, FOR SOLUTION INTRAVENOUS at 21:17

## 2022-01-01 RX ADMIN — DEXTROSE MONOHYDRATE: 50 INJECTION, SOLUTION INTRAVENOUS at 15:38

## 2022-01-01 RX ADMIN — HYDRALAZINE HYDROCHLORIDE 10 MG: 20 INJECTION INTRAMUSCULAR; INTRAVENOUS at 13:23

## 2022-01-01 RX ADMIN — 0.12% CHLORHEXIDINE GLUCONATE 15 ML: 1.2 RINSE ORAL at 10:43

## 2022-01-01 RX ADMIN — METHYLPREDNISOLONE SODIUM SUCCINATE 1000 MG: 1 INJECTION, POWDER, LYOPHILIZED, FOR SOLUTION INTRAMUSCULAR; INTRAVENOUS at 20:08

## 2022-01-01 RX ADMIN — VASOPRESSIN 100 ML/HR: 20 INJECTION, SOLUTION INTRAVENOUS at 01:18

## 2022-01-01 RX ADMIN — ALBUTEROL SULFATE 2.5 MG: 2.5 SOLUTION RESPIRATORY (INHALATION) at 08:47

## 2022-01-01 RX ADMIN — Medication 100 MCG: at 11:43

## 2022-01-01 RX ADMIN — HEPARIN SODIUM 30000 UNITS: 1000 INJECTION INTRAVENOUS; SUBCUTANEOUS at 12:17

## 2022-01-01 RX ADMIN — PANTOPRAZOLE SODIUM 40 MG: 40 INJECTION, POWDER, FOR SOLUTION INTRAVENOUS at 21:24

## 2022-01-01 RX ADMIN — SODIUM CHLORIDE 25 ML/HR: 3 INJECTION, SOLUTION INTRAVENOUS at 08:05

## 2022-01-01 RX ADMIN — HYDRALAZINE HYDROCHLORIDE 10 MG: 20 INJECTION INTRAMUSCULAR; INTRAVENOUS at 13:44

## 2022-01-01 RX ADMIN — SODIUM CHLORIDE 6.1 UNITS/HR: 9 INJECTION, SOLUTION INTRAVENOUS at 20:37

## 2022-01-01 RX ADMIN — PIPERACILLIN AND TAZOBACTAM 4500 MG: 4; .5 INJECTION, POWDER, LYOPHILIZED, FOR SOLUTION INTRAVENOUS at 00:16

## 2022-01-01 RX ADMIN — POTASSIUM CHLORIDE 20 MEQ: 29.8 INJECTION, SOLUTION INTRAVENOUS at 05:16

## 2022-01-01 RX ADMIN — MUPIROCIN: 20 OINTMENT TOPICAL at 09:02

## 2022-01-01 RX ADMIN — LEVOTHYROXINE SODIUM ANHYDROUS 10 MCG/HR: 100 INJECTION, POWDER, LYOPHILIZED, FOR SOLUTION INTRAVENOUS at 21:57

## 2022-01-01 RX ADMIN — SODIUM CHLORIDE, PRESERVATIVE FREE 10 ML: 5 INJECTION INTRAVENOUS at 11:10

## 2022-01-01 RX ADMIN — SODIUM CHLORIDE 5.6 UNITS/HR: 9 INJECTION, SOLUTION INTRAVENOUS at 05:50

## 2022-01-01 RX ADMIN — PIPERACILLIN AND TAZOBACTAM 4500 MG: 4; .5 INJECTION, POWDER, LYOPHILIZED, FOR SOLUTION INTRAVENOUS at 17:15

## 2022-01-01 RX ADMIN — PIPERACILLIN AND TAZOBACTAM 4500 MG: 4; .5 INJECTION, POWDER, LYOPHILIZED, FOR SOLUTION INTRAVENOUS at 16:00

## 2022-01-01 RX ADMIN — SODIUM CHLORIDE: 9 INJECTION, SOLUTION INTRAVENOUS at 20:26

## 2022-01-01 RX ADMIN — MIDAZOLAM 2 MG: 1 INJECTION, SOLUTION INTRAMUSCULAR; INTRAVENOUS at 23:45

## 2022-01-01 RX ADMIN — SODIUM CHLORIDE, POTASSIUM CHLORIDE, SODIUM LACTATE AND CALCIUM CHLORIDE 250 ML: 600; 310; 30; 20 INJECTION, SOLUTION INTRAVENOUS at 19:58

## 2022-01-01 RX ADMIN — POTASSIUM CHLORIDE 40 MEQ: 400 INJECTION, SOLUTION INTRAVENOUS at 08:50

## 2022-01-01 RX ADMIN — Medication 10 MEQ: at 00:00

## 2022-01-01 RX ADMIN — ALBUTEROL SULFATE 2.5 MG: 2.5 SOLUTION RESPIRATORY (INHALATION) at 21:13

## 2022-01-01 RX ADMIN — PIPERACILLIN AND TAZOBACTAM 4500 MG: 4; .5 INJECTION, POWDER, LYOPHILIZED, FOR SOLUTION INTRAVENOUS at 09:27

## 2022-01-01 RX ADMIN — ACETAMINOPHEN 650 MG: 650 SUPPOSITORY RECTAL at 13:17

## 2022-01-01 RX ADMIN — SODIUM CHLORIDE 9.03 UNITS/HR: 9 INJECTION, SOLUTION INTRAVENOUS at 20:49

## 2022-01-01 RX ADMIN — SODIUM CHLORIDE, PRESERVATIVE FREE 10 ML: 5 INJECTION INTRAVENOUS at 08:10

## 2022-01-01 RX ADMIN — VECURONIUM BROMIDE 10 MG: 10 INJECTION, POWDER, LYOPHILIZED, FOR SOLUTION INTRAVENOUS at 09:46

## 2022-01-01 RX ADMIN — DESMOPRESSIN ACETATE 1 MCG: 4 SOLUTION INTRAVENOUS at 08:55

## 2022-01-01 RX ADMIN — MAGNESIUM SULFATE HEPTAHYDRATE 2000 MG: 40 INJECTION, SOLUTION INTRAVENOUS at 16:15

## 2022-01-01 RX ADMIN — Medication 10 MEQ: at 02:00

## 2022-01-01 RX ADMIN — LEVETIRACETAM 500 MG: 5 INJECTION INTRAVENOUS at 09:09

## 2022-01-01 RX ADMIN — METHYLPREDNISOLONE SODIUM SUCCINATE 1000 MG: 1 INJECTION, POWDER, LYOPHILIZED, FOR SOLUTION INTRAMUSCULAR; INTRAVENOUS at 12:45

## 2022-01-01 RX ADMIN — METHYLPREDNISOLONE SODIUM SUCCINATE 1000 MG: 1 INJECTION, POWDER, LYOPHILIZED, FOR SOLUTION INTRAMUSCULAR; INTRAVENOUS at 13:12

## 2022-01-01 RX ADMIN — POTASSIUM CHLORIDE 40 MEQ: 400 INJECTION, SOLUTION INTRAVENOUS at 01:14

## 2022-01-01 RX ADMIN — PIPERACILLIN AND TAZOBACTAM 4500 MG: 4; .5 INJECTION, POWDER, LYOPHILIZED, FOR SOLUTION INTRAVENOUS at 10:02

## 2022-01-01 RX ADMIN — FUROSEMIDE 20 MG: 10 INJECTION, SOLUTION INTRAMUSCULAR; INTRAVENOUS at 08:50

## 2022-01-01 RX ADMIN — SODIUM CHLORIDE, PRESERVATIVE FREE 10 ML: 5 INJECTION INTRAVENOUS at 08:55

## 2022-01-01 RX ADMIN — SODIUM CHLORIDE, PRESERVATIVE FREE 10 ML: 5 INJECTION INTRAVENOUS at 20:37

## 2022-01-01 RX ADMIN — Medication 100 MCG/HR: at 15:21

## 2022-01-01 RX ADMIN — WATER 5 ML: 1 INJECTION INTRAMUSCULAR; INTRAVENOUS; SUBCUTANEOUS at 01:34

## 2022-01-01 RX ADMIN — POTASSIUM PHOSPHATE, MONOBASIC AND POTASSIUM PHOSPHATE, DIBASIC 20 MMOL: 224; 236 INJECTION, SOLUTION, CONCENTRATE INTRAVENOUS at 06:33

## 2022-01-01 RX ADMIN — FUROSEMIDE 20 MG: 10 INJECTION INTRAMUSCULAR; INTRAVENOUS at 08:50

## 2022-01-01 RX ADMIN — ALBUTEROL SULFATE 2.5 MG: 2.5 SOLUTION RESPIRATORY (INHALATION) at 04:28

## 2022-01-01 RX ADMIN — POTASSIUM CHLORIDE 20 MEQ: 400 INJECTION, SOLUTION INTRAVENOUS at 01:47

## 2022-01-01 RX ADMIN — Medication 1500 MG: at 09:46

## 2022-01-01 RX ADMIN — ALBUTEROL SULFATE 2.5 MG: 2.5 SOLUTION RESPIRATORY (INHALATION) at 04:58

## 2022-01-01 RX ADMIN — CEFEPIME HYDROCHLORIDE 2000 MG: 2 INJECTION, POWDER, FOR SOLUTION INTRAVENOUS at 08:10

## 2022-01-01 RX ADMIN — SODIUM BICARBONATE 50 MEQ: 84 INJECTION, SOLUTION INTRAVENOUS at 22:27

## 2022-01-01 RX ADMIN — DESMOPRESSIN ACETATE 1 MCG: 4 SOLUTION INTRAVENOUS at 14:03

## 2022-01-01 RX ADMIN — ALBUTEROL SULFATE 2.5 MG: 2.5 SOLUTION RESPIRATORY (INHALATION) at 16:53

## 2022-01-01 RX ADMIN — POTASSIUM CHLORIDE 20 MEQ: 400 INJECTION, SOLUTION INTRAVENOUS at 13:41

## 2022-01-01 RX ADMIN — ALBUTEROL SULFATE 2.5 MG: 2.5 SOLUTION RESPIRATORY (INHALATION) at 09:09

## 2022-01-01 RX ADMIN — LEVETIRACETAM 500 MG: 5 INJECTION INTRAVENOUS at 09:45

## 2022-01-01 RX ADMIN — LEVETIRACETAM 4000 MG: 100 INJECTION INTRAVENOUS at 11:39

## 2022-01-01 RX ADMIN — Medication 10 MEQ: at 01:00

## 2022-01-01 RX ADMIN — DEXTROSE MONOHYDRATE: 50 INJECTION, SOLUTION INTRAVENOUS at 23:44

## 2022-01-01 RX ADMIN — Medication 5 MCG/MIN: at 15:37

## 2022-01-01 RX ADMIN — PIPERACILLIN AND TAZOBACTAM 4500 MG: 4; .5 INJECTION, POWDER, LYOPHILIZED, FOR SOLUTION INTRAVENOUS at 16:21

## 2022-01-01 RX ADMIN — SODIUM CHLORIDE, PRESERVATIVE FREE 10 ML: 5 INJECTION INTRAVENOUS at 09:10

## 2022-01-01 RX ADMIN — SODIUM CHLORIDE, PRESERVATIVE FREE 10 ML: 5 INJECTION INTRAVENOUS at 21:01

## 2022-01-01 RX ADMIN — MIDAZOLAM 2 MG: 1 INJECTION INTRAMUSCULAR; INTRAVENOUS at 01:34

## 2022-01-01 RX ADMIN — PIPERACILLIN AND TAZOBACTAM 4500 MG: 4; .5 INJECTION, POWDER, LYOPHILIZED, FOR SOLUTION INTRAVENOUS at 11:50

## 2022-01-01 RX ADMIN — MUPIROCIN: 20 OINTMENT TOPICAL at 21:46

## 2022-01-01 RX ADMIN — SODIUM CHLORIDE, PRESERVATIVE FREE 10 ML: 5 INJECTION INTRAVENOUS at 21:24

## 2022-01-01 RX ADMIN — VECURONIUM BROMIDE FOR INJECTION 5 MG: 1 INJECTION, POWDER, LYOPHILIZED, FOR SOLUTION INTRAVENOUS at 23:45

## 2022-01-01 RX ADMIN — Medication 2 MG/HR: at 10:41

## 2022-01-01 RX ADMIN — DESMOPRESSIN ACETATE 1 MCG: 4 SOLUTION INTRAVENOUS at 15:00

## 2022-01-01 RX ADMIN — VASOPRESSIN 100 ML/HR: 20 INJECTION, SOLUTION INTRAVENOUS at 02:08

## 2022-01-01 RX ADMIN — VASOPRESSIN 100 ML/HR: 20 INJECTION, SOLUTION INTRAVENOUS at 05:42

## 2022-01-01 RX ADMIN — PANTOPRAZOLE SODIUM 40 MG: 40 INJECTION, POWDER, FOR SOLUTION INTRAVENOUS at 09:10

## 2022-01-01 RX ADMIN — PIPERACILLIN AND TAZOBACTAM 4500 MG: 4; .5 INJECTION, POWDER, LYOPHILIZED, FOR SOLUTION INTRAVENOUS at 17:23

## 2022-01-01 RX ADMIN — METHYLPREDNISOLONE SODIUM SUCCINATE 1000 MG: 1 INJECTION, POWDER, LYOPHILIZED, FOR SOLUTION INTRAMUSCULAR; INTRAVENOUS at 21:12

## 2022-01-01 RX ADMIN — 0.12% CHLORHEXIDINE GLUCONATE 15 ML: 1.2 RINSE ORAL at 20:14

## 2022-01-01 RX ADMIN — PIPERACILLIN AND TAZOBACTAM 4500 MG: 4; .5 INJECTION, POWDER, LYOPHILIZED, FOR SOLUTION INTRAVENOUS at 01:22

## 2022-01-01 RX ADMIN — POTASSIUM CHLORIDE 40 MEQ: 400 INJECTION, SOLUTION INTRAVENOUS at 15:04

## 2022-01-01 RX ADMIN — Medication 1500 MG: at 09:38

## 2022-01-01 RX ADMIN — POTASSIUM CHLORIDE 20 MEQ: 400 INJECTION, SOLUTION INTRAVENOUS at 08:30

## 2022-01-01 RX ADMIN — Medication 50 MCG/HR: at 03:39

## 2022-01-01 RX ADMIN — PANTOPRAZOLE SODIUM 40 MG: 40 INJECTION, POWDER, FOR SOLUTION INTRAVENOUS at 08:55

## 2022-01-01 RX ADMIN — SODIUM CHLORIDE: 4.5 INJECTION, SOLUTION INTRAVENOUS at 01:44

## 2022-01-01 RX ADMIN — PANTOPRAZOLE SODIUM 40 MG: 40 INJECTION, POWDER, FOR SOLUTION INTRAVENOUS at 20:14

## 2022-01-01 RX ADMIN — CEFEPIME HYDROCHLORIDE 2000 MG: 2 INJECTION, POWDER, FOR SOLUTION INTRAVENOUS at 08:32

## 2022-01-01 RX ADMIN — Medication 2 MG/HR: at 10:06

## 2022-01-01 RX ADMIN — MANNITOL 75.8 G: 20 INJECTION, SOLUTION INTRAVENOUS at 17:30

## 2022-01-01 RX ADMIN — Medication 1 MCG/MIN: at 06:18

## 2022-01-01 RX ADMIN — LEVETIRACETAM 500 MG: 5 INJECTION INTRAVENOUS at 21:13

## 2022-01-01 RX ADMIN — SODIUM CHLORIDE, PRESERVATIVE FREE 10 ML: 5 INJECTION INTRAVENOUS at 09:41

## 2022-01-01 RX ADMIN — ALBUTEROL SULFATE 2.5 MG: 2.5 SOLUTION RESPIRATORY (INHALATION) at 00:34

## 2022-01-01 ASSESSMENT — PULMONARY FUNCTION TESTS
PIF_VALUE: 20
PIF_VALUE: 20
PIF_VALUE: 30
PIF_VALUE: 27
PIF_VALUE: 0
PIF_VALUE: 20
PIF_VALUE: 22
PIF_VALUE: 30
PIF_VALUE: 23
PIF_VALUE: 22
PIF_VALUE: 23
PIF_VALUE: 24
PIF_VALUE: 18
PIF_VALUE: 20
PIF_VALUE: 25
PIF_VALUE: 24
PIF_VALUE: 22
PIF_VALUE: 24
PIF_VALUE: 30
PIF_VALUE: 20
PIF_VALUE: 23
PIF_VALUE: 21
PIF_VALUE: 22
PIF_VALUE: 20
PIF_VALUE: 24
PIF_VALUE: 22
PIF_VALUE: 22
PIF_VALUE: 30
PIF_VALUE: 22
PIF_VALUE: 30
PIF_VALUE: 20
PIF_VALUE: 22
PIF_VALUE: 29
PIF_VALUE: 20
PIF_VALUE: 27
PIF_VALUE: 24
PIF_VALUE: 22
PIF_VALUE: 24
PIF_VALUE: 24
PIF_VALUE: 30
PIF_VALUE: 26
PIF_VALUE: 24

## 2022-01-01 ASSESSMENT — LIFESTYLE VARIABLES: SMOKING_STATUS: 1

## 2022-05-12 PROBLEM — I46.9 CARDIAC ARREST (HCC): Status: ACTIVE | Noted: 2022-01-01

## 2022-05-12 NOTE — Clinical Note
Discharge Plan[de-identified] Other/[facility])   Telemetry/Cardiac Monitoring Required?: Yes

## 2022-05-13 PROBLEM — N17.9 AKI (ACUTE KIDNEY INJURY) (HCC): Status: ACTIVE | Noted: 2022-01-01

## 2022-05-13 PROBLEM — E87.29 HIGH ANION GAP METABOLIC ACIDOSIS: Status: ACTIVE | Noted: 2022-01-01

## 2022-05-13 PROBLEM — D72.9 NEUTROPHILIC LEUKOCYTOSIS: Status: ACTIVE | Noted: 2022-01-01

## 2022-05-13 PROBLEM — F14.10 COCAINE ABUSE (HCC): Status: ACTIVE | Noted: 2022-01-01

## 2022-05-13 PROBLEM — T40.414A: Status: ACTIVE | Noted: 2022-01-01

## 2022-05-13 PROBLEM — F19.10 POLYSUBSTANCE ABUSE (HCC): Status: ACTIVE | Noted: 2022-01-01

## 2022-05-13 PROBLEM — R74.01 TRANSAMINITIS: Status: ACTIVE | Noted: 2022-01-01

## 2022-05-13 PROBLEM — E87.6 HYPOKALEMIA: Status: ACTIVE | Noted: 2022-01-01

## 2022-05-13 NOTE — CARE COORDINATION
5/13:  Transition of care: Pt presented to the ER for cardiac arrest.  Pt was reportedly in asystole for  15 minutes. PMH substance abuse. UDS + cocaine, fentanyl & cannabis. Ct Head showed diffuse cerebral edema concerning for hypoxic brain injury. Neurology consulted. Pt was intubated & transferred to [facility]. Pt remains intubated on Iv thiamine, Iv Vanco, Iv Keppra, Iv Protonix, Iv Zosyn, Iv 3% NS  &  Iv Fentanyl. Needs are unclear. Will need PT/OT/Speech evals. MRI brain pending. ROLANDO left message for Yohan Ayala to obtain some information for our records. Sw/ROLANDO will continue to follow.  Electronically signed by Av Dolan RN on 5/13/2022 at 1:52 PM

## 2022-05-13 NOTE — ED PROVIDER NOTES
HPI:  22, Time: 10:18 PM EDT         Hetal Mcleod is a 40 y.o. female presenting to the ED for cardiac arrest, beginning over 30 minutes ago. The complaint has been constant, severe in severity, and worsened by nothing. Patient was brought in by EMS. Patient was found in cardiac arrest.  Patient was reportedly in asystole for least 15 minutes. They did have return of pulses after 15 minutes. Patient then lost pulses while being brought here to the emergency department. Per report from EMS she had a pulse for about 10 minutes then lost them several minutes ago. Patient was given Narcan there is history of substance abuse. Patient unable to give history. Patient unresponsive. Patient arrived here with no pulses. CPR continued here in the emergency department. ROS:   Pertinent positives and negatives are stated within HPI, all other systems reviewed and are negative.  --------------------------------------------- PAST HISTORY ---------------------------------------------  Past Medical History:  has no past medical history on file. Past Surgical History:  has a past surgical history that includes  section and Tubal ligation. Social History:  reports that she has been smoking cigarettes. She has been smoking about 1.00 pack per day. She has never used smokeless tobacco. She reports that she does not drink alcohol and does not use drugs. Family History: family history is not on file. The patients home medications have been reviewed. Allergies: Patient has no known allergies.     ---------------------------------------------------PHYSICAL EXAM--------------------------------------    Constitutional/General: Unresponsive  Head: Normocephalic and atraumatic  Eyes: Pupils fixed nonreactive  Mouth: Oropharynx clear, handling secretions, no trismus  Neck: Supple, full ROM, non tender to palpation in the midline, no stridor, no crepitus, no meningeal signs  Pulmonary: Lungs clear to mg/dL    GFR Non-African American 56 >=60 mL/min/1.73    GFR African American >60     Calcium 7.3 (L) 8.6 - 10.2 mg/dL    Total Protein 4.9 (L) 6.4 - 8.3 g/dL    Albumin 3.2 (L) 3.5 - 5.2 g/dL    Total Bilirubin 0.3 0.0 - 1.2 mg/dL    Alkaline Phosphatase 69 35 - 104 U/L     (H) 0 - 32 U/L     (H) 0 - 31 U/L   Troponin   Result Value Ref Range    Troponin, High Sensitivity 10 (H) 0 - 9 ng/L   Serum Drug Screen   Result Value Ref Range    Ethanol Lvl <10 mg/dL    Acetaminophen Level <5.0 (L) 10.0 - 38.2 mcg/mL    Salicylate, Serum <8.3 0.0 - 30.0 mg/dL    TCA Scrn NEGATIVE Cutoff:300 ng/mL   URINE DRUG SCREEN   Result Value Ref Range    Amphetamine Screen, Urine NOT DETECTED Negative <1000 ng/mL    Barbiturate Screen, Ur NOT DETECTED Negative < 200 ng/mL    Benzodiazepine Screen, Urine NOT DETECTED Negative < 200 ng/mL    Cannabinoid Scrn, Ur POSITIVE (A) Negative < 50ng/mL    Cocaine Metabolite Screen, Urine POSITIVE (A) Negative < 300 ng/mL    Opiate Scrn, Ur NOT DETECTED Negative < 300ng/mL    PCP Screen, Urine NOT DETECTED Negative < 25 ng/mL    Methadone Screen, Urine NOT DETECTED Negative <300 ng/mL    Oxycodone Urine NOT DETECTED Negative <100 ng/mL    FENTANYL SCREEN, URINE POSITIVE (A) Negative <1 ng/mL    Drug Screen Comment: see below    Urinalysis   Result Value Ref Range    Color, UA Yellow Straw/Yellow    Clarity, UA SL CLOUDY Clear    Glucose, Ur 250 (A) Negative mg/dL    Bilirubin Urine Negative Negative    Ketones, Urine Negative Negative mg/dL    Specific Gravity, UA 1.020 1.005 - 1.030    Blood, Urine MODERATE (A) Negative    pH, UA 7.0 5.0 - 9.0    Protein, UA >=300 (A) Negative mg/dL    Urobilinogen, Urine 1.0 <2.0 E.U./dL    Nitrite, Urine POSITIVE (A) Negative    Leukocyte Esterase, Urine Negative Negative   Lactic Acid   Result Value Ref Range    Lactic Acid 8.5 (HH) 0.5 - 2.2 mmol/L   Blood Gas, Arterial   Result Value Ref Range    Date Analyzed [id number]     Time Analyzed 2218 Source: Blood Arterial     pH, Blood Gas 7.035 (LL) 7.350 - 7.450    PCO2 56.4 (H) 35.0 - 45.0 mmHg    PO2 151.1 (H) 75.0 - 100.0 mmHg    HCO3 14.7 (L) 22.0 - 26.0 mmol/L    B.E. -15.9 (L) -3.0 - 3.0 mmol/L    O2 Sat 97.7 92.0 - 98.5 %    PO2/FIO2 1.51 mmHg/%    O2Hb 92.4 (L) 94.0 - 97.0 %    COHb 5.1 (H) 0.0 - 1.5 %    MetHb 0.3 0.0 - 1.5 %    O2 Content 15.8 mL/dL    HHb 2.2 0.0 - 5.0 %    tHb (est) 11.9 11.5 - 16.5 g/dL    Potassium 2.94 (L) 3.50 - 5.00 mmol/L    Mode AC     FIO2 100.0 %    Rr Mechanical 18.0 b/min    Vt Mechanical 350.0 mL    Peep/Cpap 8.0 cmH2O    Date Of Collection      Time Collected      Pt Temp 37.0 C     ID [id number]     Lab [id number]     Critical(s) Notified Handed report to Dr/RN    Microscopic Urinalysis   Result Value Ref Range    WBC, UA 1-3 0 - 5 /HPF    RBC, UA 10-20 (A) 0 - 2 /HPF    Bacteria, UA MANY (A) None Seen /HPF   Magnesium   Result Value Ref Range    Magnesium 1.9 1.6 - 2.6 mg/dL   POC Pregnancy Urine Qual   Result Value Ref Range    HCG, Urine, POC Negative Negative    Lot Number [id number]     Positive QC Pass/Fail Pass     Negative QC Pass/Fail Pass        RADIOLOGY:  Interpreted by Radiologist.  CT HEAD WO CONTRAST   Final Result   Diffuse cerebral and cerebellar edema highly concerning for hypoxic-ischemic   encephalopathy, particularly given patient's clinical history of cardiac   arrest with prolonged resuscitation. Critical results were called by Dr. Israel Gregory to [place] on   5/12/2022 at 21:46. CTA CHEST W CONTRAST   Final Result   No evidence of pulmonary embolism or acute pulmonary abnormality. No evidence of aortic dissection or aneurysm. Endotracheal tube tip is in the right mainstem bronchus and should be   retracted 5 cm         XR CHEST PORTABLE   Final Result   1. Endotracheal tube extends into the right mainstem bronchus. Recommend   retraction as above. 2.  Satisfactory position of the enteric tube.       3. Faint interstitial opacities identified bilaterally. Prominence of the   cardiac silhouette. XR ABDOMEN FOR NG/OG/NE TUBE PLACEMENT   Final Result   1. Endotracheal tube extends into the right mainstem bronchus. Recommend   retraction as above. 2.  Satisfactory position of the enteric tube. 3.  Faint interstitial opacities identified bilaterally. Prominence of the   cardiac silhouette. XR CHEST PORTABLE    (Results Pending)       EKG: This EKG is signed and interpreted by me. Rate: 101  Rhythm: Sinus tachycardia  Interpretation: non-specific EKG  Comparison: Compared to previous        ------------------------- NURSING NOTES AND VITALS REVIEWED ---------------------------   The nursing notes within the ED encounter and vital signs as below have been reviewed by myself. BP (!) 175/109   Pulse 97   Resp 21   SpO2 100%   Oxygen Saturation Interpretation: Normal    The patients available past medical records and past encounters were reviewed.         ------------------------------ ED COURSE/MEDICAL DECISION MAKING----------------------  Medications   fentaNYL 10 mcg/ml in 0.9%  ml infusion (has no administration in time range)   0.9 % sodium chloride bolus (0 mLs IntraVENous Stopped 5/12/22 2243)   sodium bicarbonate 8.4 % injection 50 mEq (50 mEq IntraVENous Given 5/12/22 2227)   sodium bicarbonate 8.4 % injection 50 mEq (50 mEq IntraVENous Given 5/12/22 2227)   iopamidol (ISOVUE-370) 76 % injection 75 mL (75 mLs IntraVENous Given 5/12/22 2319)   midazolam PF (VERSED) injection 2 mg (2 mg IntraVENous Given 5/12/22 2345)   vecuronium (NORCURON) injection 5 mg (5 mg IntraVENous Given 5/12/22 2345)   sterile water injection (5 mLs  Given 5/12/22 2346)   potassium chloride 10 mEq/100 mL IVPB (Peripheral Line) (10 mEq IntraVENous New Bag 5/13/22 2407)   vecuronium (NORCURON) injection 5 mg (5 mg IntraVENous Given 5/13/22 0134)   midazolam PF (VERSED) injection 2 mg (2 mg IntraVENous Given 5/13/22 0134)   sterile water injection (5 mLs  Given 5/13/22 0134)         PROCEDURE  5/12/22       Time: 1013 pm    CENTRAL LINE INSERTION  Risks, benefits and alternatives (for applicable procedures below) described. Performed By: Curtis Kruse MD.    Indication: vascular access and inability to gain peripheral access. Informed consent: Consent unable to be obtained due to the emergent nature of this procedure. .  Procedure: After routine sterile preparation, local anesthesia not performed due to emergent nature of this procedure. A right 3-Lumen 7F Central Venous Catheter was placed by femoral vein approach and secured by standard fashion. Ultrasound Guidance:   not used. Number of Attempts: 2  Post-procedure Findings: A post procedural chest x-ray  was not indicated. Patient tolerated the procedure well. Medical Decision Making:      Presenting here because of being in cardiac arrest.  Patient reportedly was was down for at least 15 minutes before they were able to obtain pulse. Patient was intubated by EMS. Patient did have CPR was medicated. Patient reportedly did have return of pulses and when the patient arrived here lost pulses. Patient had CPR that was continued by EMS and then here in the emergency department. Patient did have return of pulses without medication. Patient vital signs noted. Patient pupils are fixed and dilated. Patient has no upper or lower extremity movement. Labs and EKG noted reviewed patient was given IV bicarb. Patient still unresponsive. Blood pressure did not improve. Patient actually came hypertensive here. Patient will be admitted to the ICU. I did speak to internal medicine as well as intensivist.  Re-Evaluations:             Patient CPR was initiated here in the emergency room. Patient did have return of pulse several minutes after arrival.  Patient blood pressure noted. Patient given IV fluids. Patient reeval multiple times. Patient unchanged. Consultations:             Internal medicine as well as intensivist    Critical Care:     Please note that the withdrawal or failure to initiate urgent interventions for this patient would likely result in a life threatening deterioration or permanent disability. Accordingly this patient received 30 minutes of critical care time, excluding separately billable procedures. This patient's ED course included: a personal history and physicial eaxmination    This patient has been closely monitored during their ED course. Counseling: The emergency provider has spoken with the  and discussed todays results, in addition to providing specific details for the plan of care and counseling regarding the diagnosis and prognosis. Questions are answered at this time and they are agreeable with the plan.       --------------------------------- IMPRESSION AND DISPOSITION ---------------------------------    IMPRESSION  1. Cardiac arrest ([facility]    2. [facility] ([facility]        DISPOSITION  Disposition: Admit to ICU  Patient condition is critical        NOTE: This report was transcribed using voice recognition software.  Every effort was made to ensure accuracy; however, inadvertent computerized transcription errors may be present          Elizabeth Benitez MD  05/12/22 8493       Elizabeth Benitez MD  05/13/22 9099

## 2022-05-13 NOTE — OP NOTE
Pulmonary [facility]    Department of Internal Medicine  Division of Pulmonary, Critical Care & Sleep Medicine    Procedure Note    Procedure: Insertion of Central Line     Indications:   Hemodynamic/CVP monitoring and IV access    TLC placed in artery from ER    Anesthesia: Local infiltration of 1% lidocaine. Consent:  Informed written consent obtained. Surgeon: Jerrell Haines DO    Technique:  Procedure was done using strict aseptic technique. The patient's neck was prepped and draped in the usual sterile fashion. This area of the neck overlying the R SC area. Then using the Seldinger technique, a large-bore needle was placed into the jugular vein with good backflow. A guidewire was placed. Then using an 11 blade, a small incision was made in the patient's skin. The large-bore needle was removed. A dilator was passed over the guidewire. Once completed, a triple lumen catheter was placed over the guidewire with the guidewire then subsequently removed. All three ports were drawn and flushed with good flow. Then the catheter was sutured in place, and a sterile dressing was put in place. Complications: No immediate complication. Estimated blood loss: Minimal.     Comment: Patient tolerated the procedure well.      Placement:  CXR was requested to confirm placement    Jerrell Haines DO, DO, [facility], [facility], [address]  5/13/2022  11:16 AM

## 2022-05-13 NOTE — CONSULTS
[facility] [address]  Neurology Consult    Date:  2022  Patient Name:  Megan Reyes  YOB: 1985  MRN: [id number]     PCP:  No primary care provider on file. Referring:  No ref. provider found      Chief Complaint: Cardiac Arrest     History obtained from: Chart Review and Partner    Assessment  Megan Reyes is a 40 y.o. female presenting with anoxic brain injury post cardiac arrest.      Plan  · Continue keppra  · Continue current sedation  · Start EEG        History of Present Illness:  Megan Reyes is a 40 y.o.  female presenting for evaluation of hypoxic brain injury secondary to out of hospital cardiac arrest. Patient was found unresponsive at 9:35 on  in her home. EMS found her in asystole, was able to bring her back to ROCS in 15 minutes but lost the pulse a few minutes later. She was brought to the ED where CPR continued, but was eventually intubated when ROCS could not occur. This morning, she is intubated and sedated and unresponsive. Review of Systems:      Unable to obtain review of symptoms due to Intubation  Medical History:   No past medical history on file. Surgical History:   Past Surgical History:   Procedure Laterality Date     SECTION      TUBAL LIGATION          Family History:  No family history on file.     Social History:  Social History     Tobacco Use    Smoking status: Current Every Day Smoker     Packs/day: 1.00     Types: Cigarettes    Smokeless tobacco: Never Used   Vaping Use    Vaping Use: Never used   Substance Use Topics    Alcohol use: No    Drug use: No        Current Medications:      Current Facility-Administered Medications   Medication Dose Route Frequency Provider Last Rate Last Admin    sodium chloride flush 0.9 % injection 5-40 mL  5-40 mL IntraVENous 2 times per day Fan Gibbons MD        sodium chloride flush 0.9 % injection 5-40 mL  5-40 mL IntraVENous PRN Fan Gibbons MD        0.9 % sodium chloride infusion   IntraVENous PRN Debbie Connolly MD        polyethylene glycol [facility]) packet 17 g  17 g Oral Daily PRN Debbie Connolly MD        acetaminophen (TYLENOL) tablet 650 mg  650 mg Oral Q6H PRN Debbie Connolly MD        Or    acetaminophen (TYLENOL) suppository 650 mg  650 mg Rectal Q6H PRN Debbie Connolly MD        fentaNYL 10 mcg/ml in 0.9%  ml infusion   mcg/hr IntraVENous Continuous Debbie Connolly MD 10 mL/hr at 05/13/22 0632 100 mcg/hr at 05/13/22 2709    midazolam (VERSED) 1 mg/mL in D5W infusion  1-10 mg/hr IntraVENous Continuous Sarai Gaxiola MD        levETIRAcetam (KEPPRA) 4,000 mg in sodium chloride 0.9 % 100 mL IVPB  4,000 mg IntraVENous Once Wayne Murray DO        chlorhexidine (PERIDEX) 0.12 % solution 15 mL  15 mL Mouth/Throat BID Wayne Murray DO        sodium chloride 3 % solution  25 mL/hr IntraVENous Continuous Sarai Gaxiola MD        piperacillin-tazobactam (ZOSYN) 4,500 mg in dextrose 5 % 100 mL IVPB ([id number])  4,500 mg IntraVENous Q8H Sarai Gaxiola MD        pantoprazole (PROTONIX) injection 40 mg  40 mg IntraVENous BID Wayne Murray DO        glucose chewable tablet 16 g  4 tablet Oral PRN Sarai Gaxiola MD        glucagon (rDNA) injection 1 mg  1 mg IntraMUSCular PRN Sarai Gaxiola MD        dextrose 5 % solution  100 mL/hr IntraVENous PRN Sarai Gaxiola MD        dextrose 50 % IV solution  12.5 g IntraVENous PRN Giselle Conrad DO        vancomycin (VANCOCIN) 1,250 mg in dextrose 5 % 250 mL IVPB  15 mg/kg IntraVENous Q24H Sarai Gaxiola MD        levETIRAcetam (KEPPRA) 500 mg/100 mL IVPB  500 mg IntraVENous Q12H Sarai Gaxiola MD            Allergies:      No Known Allergies     Physical Examination  Vitals   Vitals:    05/13/22 0834 05/13/22 0836 05/13/22 0845 05/13/22 0852   BP:       Pulse: 110 109  124   Resp: 18   19   Temp:       TempSrc:       SpO2: 100%      Weight:   166 lb 3.6 oz (75.4 kg) General: Patient appears in no acute distress. Awake and oriented x 0. HEENT: Normocephalic, atraumatic  Chest: Clear to auscultation bilaterally  Heart: No murmurs appreciated  Extremities/Peripheral vascular: No edema/swelling noted. No cold limbs noted. Neurologic Examination    Mental Status  Sedated and intubated    Cranial Nerves  II. Unable to perform due to lack of patient cooperation. III, IV, VI: Pupils Constricted EOMs: unable to assess  V.  Facial sensation -- unresponsive  VII: Facial movements -- unable to assess  VIII: Hearing -- unresponsive  IX,X: Palate -- unable to assess  XI: Sternocleidomastoid -- unable to assess   XII: Tongue is midline -- unable to assess    Motor     Right Left   Right Left   Deltoid 0 0  Hip Flexion 0 0   Biceps 0 0  Knee Extension 0 0   Triceps 0 0  Knee Flexion 0 0   Handgrip 0 0  Ankle Dorsiflexion 0 0       Ankle Plantarflexion 0 0     Tone: Normal in all four limbs    Bulk: Normal in all four limbs with no evidence of atrophy    Pronator drift: unable to assess    Sensation  · Light Touch: Intact distally in all four limbs  · Pinprick: Intact distally in all four limbs  · Vibration: Intact distally in all four limbs  · Proprioception: Intact distally in all four limbs    Reflexes     Right Left   Biceps 0 0   Brachioradialis 0 0   Triceps 0 0   Patellar 0 0   Achilles 0 0   ankle clonus present present   Babinski absent absent     Coordination  Unable to assess    Gait   Deferred for safety/fall consideration      Labs  Recent Labs     05/12/22 2218 05/12/22 2219 05/12/22 2230 05/12/22 2230 05/13/22  0423   NA  --   --  145   < > 148*   K 2.94*   < > 2.8*   < > 3.1*   CL  --   --  108*   < > 111*   CO2  --   --  17*   < > 18*   BUN  --   --  6   < > 9   CREATININE  --   --  1.1*   < > 1.1*   GLUCOSE  --   --  295*   < > 213*   CALCIUM  --   --  7.3*   < > 8.3*   PROT  --   --  4.9*  --   --    LABALBU  --   --  3.2*  --   --    BILITOT  --   --  0.3  -- --    ALKPHOS  --   --  69  --   --    AST  --   --  117*  --   --    ALT  --   --  104*  --   --    WBC  --   --  12.0*   < > 26.7*   RBC  --   --  3.59   < > 4.61   HGB  --   --  10.8*   < > 13.9   HCT  --   --  34.7   < > 41.9   MCV  --   --  96.7   < > 90.9   MCH  --   --  30.1   < > 30.2   MCHC  --   --  31.1*   < > 33.2   RDW  --   --  14.1   < > 14.3   PLT  --   --  189   < > 252   MPV  --   --  10.6   < > 10.5   PH 7.035*  --   --   --   --    PO2 151.1*  --   --   --   --    PCO2 56.4*  --   --   --   --    HCO3 14.7*  --   --   --   --    BE -15.9*  --   --   --   --    O2SAT 97.7  --   --   --   --    LACTA  --   --  8.5*  --   --     < > = values in this interval not displayed. Imaging  CT HEAD WO CONTRAST   Final Result   Diffuse cerebral and cerebellar edema highly concerning for hypoxic-ischemic   encephalopathy, particularly given patient's clinical history of cardiac   arrest with prolonged resuscitation. Critical results were called by Dr. Imani Betts to [facility] on   5/12/2022 at 21:46. CTA CHEST W CONTRAST   Final Result   No evidence of pulmonary embolism or acute pulmonary abnormality. No evidence of aortic dissection or aneurysm. Endotracheal tube tip is in the right mainstem bronchus and should be   retracted 5 cm         XR CHEST PORTABLE   Final Result   1. Endotracheal tube extends into the right mainstem bronchus. Recommend   retraction as above. 2.  Satisfactory position of the enteric tube. 3.  Faint interstitial opacities identified bilaterally. Prominence of the   cardiac silhouette. XR ABDOMEN FOR NG/OG/NE TUBE PLACEMENT   Final Result   1. Endotracheal tube extends into the right mainstem bronchus. Recommend   retraction as above. 2.  Satisfactory position of the enteric tube. 3.  Faint interstitial opacities identified bilaterally. Prominence of the   cardiac silhouette.          MRI BRAIN WO CONTRAST (Results Pending)                   Electronically signed by Luis Peterson on 5/13/2022 at 10:00 AM

## 2022-05-13 NOTE — CONSULTS
[facility]  Internal Medicine Residency Program  CONSULT MICU    Patient:  Lynnell Favre 40 y.o. female MRN: [id number]     Date of Service: 2022    Hospital Day: 2      Chief complaint:   Chief Complaint   Patient presents with    Cardiac Arrest     Cardiac arrest, Hx of opiate OD. EMS arrived around 2135 and pt unresponsive, in asystole. Epi admin x3 and ROSC achieved after 15 min. Pt went into [facility] PTA and received another dose of epi. 2mg narcan. R humeral IO. History of Present Illness   Lynnell Favre is a 40 y.o. female with PMHx of polysubstance abuse (cannabis, opioids, fentanyl) who presented after having a cardiac arrest.  Patient was found by EMS unresponsive at around 9:35 PM on . Patient was in asystole at that time and ROSC was achieved 15 minutes however they lost pulse again after few minutes. Patient was then brought to the ED where CPR was continued. Unclear how many rounds of epinephrine the patient received. She was then subsequently intubated at the ED. Pt brought to the MICU early this AM. She does not withdraw to pain, pupils nonreactive, no cough/gag reflex, negative Babinski sign bilaterally. Labs showed hypokalemia, NAGMA with lactic acidosis 8.5, AST/ALT in the 100s, WBC 12, hemoglobin 10.8. UDS positive for cocaine, fentanyl, and cannabis. CT head showed diffuse cerebral edema concerning for hypoxic brain injury given recent cardiac arrest.  CXR and CTA chest unremarkable.        Unable to get in contact with partner/ family. Past Medical History:  No past medical history on file. Past Surgical History:        Procedure Laterality Date     SECTION      TUBAL LIGATION         Medications Prior to Admission:    Prior to Admission medications    Medication Sig Start Date End Date Taking? Authorizing Provider   ibuprofen (IBU) 800 MG tablet Take 1 tablet by mouth every 8 hours as needed for Pain Take with food.  19 Masha Wood, APRN - CNP   ondansetron (ZOFRAN ODT) 4 MG disintegrating tablet Take 1 tablet by mouth every 8 hours as needed for Nausea or Vomiting 6/18/19   Angel Garcia MD       Allergies:  Patient has no known allergies. Social History:   TOBACCO:   reports that she has been smoking cigarettes. She has been smoking about 1.00 pack per day. She has never used smokeless tobacco.  ETOH:   reports no history of alcohol use. OCCUPATION: unknown    Family History:   No family history on file. REVIEW OF SYSTEMS:    · Unable to obtain     Physical Exam   · Vitals: BP (!) 156/95   Pulse 124   Temp 102.4 °F (39.1 °C) (Esophageal)   Resp 22   Wt 166 lb (75.3 kg)   SpO2 99%   BMI 24.51 kg/m²   ·    ·      · General Appearance:  Intubated, unresponsive despite being off sedation   · Skin: warm and dry, no rash or erythema  · Head: normocephalic and atraumatic  · Eyes: pupils are nonreactive to light at this time   · ENT: tympanic membrane, external ear and ear canal normal bilaterally, nose without deformity, nasal mucosa and turbinates normal without polyps  · Neck: supple and non-tender without mass, no thyromegaly or thyroid nodules, no cervical lymphadenopathy  · Pulmonary/Chest: mechincal ventilation clear to auscultation bilaterally- no wheezes, rales or rhonchi, normal air movement, no respiratory distress  · Cardiovascular: normal rate, regular rhythm, normal S1 and S2, no murmurs, rubs, clicks, or gallops, distal pulses intact, no carotid bruits  · Abdomen: soft, non-tender, non-distended, normal bowel sounds, no masses or organomegaly  · Extremities: no cyanosis, clubbing or edema  · Musculoskeletal: normal range of motion, no joint swelling, deformity or tenderness  · Neurologic: Unresponsive despite being off sedation, no response to painful stimuli, no cough/gap/ corneal, pupils are nonreactive as well. Negative Babinski's b/l, Doll's eye reflex negative.      Lines     site day Art line   None    TLC R SC 5/13/2022   PICC None    Hemoaccess None    Oxygen:    Mechanical Ventilation:   AC/VC 18/350/8/40%  ABG:     Lab Results   Component Value Date    PH 7.441 05/13/2022    PCO2 31.9 05/13/2022    PO2 142.1 05/13/2022    HCO3 21.2 05/13/2022    BE -1.9 05/13/2022    THB 14.7 05/13/2022    O2SAT 98.9 05/13/2022     Labs and Imaging Studies   Basic Labs  CBC:   Lab Results   Component Value Date    WBC 26.7 05/13/2022    RBC 4.61 05/13/2022    HGB 13.9 05/13/2022    HCT 41.9 05/13/2022    MCV 90.9 05/13/2022    RDW 14.3 05/13/2022     05/13/2022     BMP:    Lab Results   Component Value Date     05/13/2022    K 4.7 05/13/2022     05/13/2022    CO2 23 05/13/2022    BUN 14 05/13/2022     U/A:  No components found for: Lucila Seek, USPGRAV, UPH, UPROTEIN, UGLUCOSE, UKETONE, UBILI, UBLOOD, UNITRITE, UUROBIL, ULEUKEST, USQEPI, URENEPI, UWBC, URBC, Synchari, UHYALINE  TSH:  No results found for: TSH  PT/INR:  No results found for: PTINR  HgBA1c:  No components found for: HGBA1C  VITAMIN B12: No components found for: B12  FOLATE:  No results found for: FOLATE  HIV:  No results found for: HIV    Imaging Studies:     CT HEAD WO CONTRAST    Result Date: 5/12/2022  EXAMINATION: CT OF THE HEAD WITHOUT CONTRAST  5/12/2022 9:21 pm TECHNIQUE: CT of the head was performed without the administration of intravenous contrast. Automated exposure control, iterative reconstruction, and/or weight based adjustment of the mA/kV was utilized to reduce the radiation dose to as low as reasonably achievable. COMPARISON: 10/22/2018 HISTORY: ORDERING SYSTEM PROVIDED HISTORY: Evaluate intracranial abnormality TECHNOLOGIST PROVIDED HISTORY: Has a \"code stroke\" or \"stroke alert\" been called? ->No Reason for exam:->Evaluate intracranial abnormality Decision Support Exception - unselect if not a suspected or confirmed emergency medical condition->Emergency Medical Condition (MA) What reading provider Confirmation of course of NG/OG/NE tube and location of tip of tube TECHNOLOGIST PROVIDED HISTORY: Reason for exam:->Confirmation of course of NG/OG/NE tube and location of tip of tube Portable? ->Yes What reading provider will be dictating this exam?->CRC FINDINGS: CHEST: Endotracheal tube extends into the right mainstem bronchus. Recommend retraction of 4-5 cm. There are ill-defined interstitial opacities bilaterally no obvious pleural effusion or pneumothorax. Cardiac silhouette is mildly prominent. Mild central pulmonary vascular congestion. Osseous and thoracic soft tissue structures demonstrate no acute findings. UPPER ABDOMEN: Enteric tube extends subdiaphragmatic with distal tip and side port projecting over the left upper quadrant. Partially imaged upper abdomen is unremarkable. 1.  Endotracheal tube extends into the right mainstem bronchus. Recommend retraction as above. 2.  Satisfactory position of the enteric tube. 3.  Faint interstitial opacities identified bilaterally. Prominence of the cardiac silhouette. CTA CHEST W CONTRAST    Result Date: 5/12/2022  EXAMINATION: CTA OF THE CHEST 5/12/2022 11:21 pm TECHNIQUE: CTA of the chest was performed after the administration of intravenous contrast.  Multiplanar reformatted images are provided for review. MIP images are provided for review. Automated exposure control, iterative reconstruction, and/or weight based adjustment of the mA/kV was utilized to reduce the radiation dose to as low as reasonably achievable. COMPARISON: None. HISTORY: ORDERING SYSTEM PROVIDED HISTORY: Arrest TECHNOLOGIST PROVIDED HISTORY: Reason for exam:->Arrest Decision Support Exception - unselect if not a suspected or confirmed emergency medical condition->Emergency Medical Condition (MA) What reading provider will be dictating this exam?->CRC FINDINGS: Pulmonary Arteries: Pulmonary arteries are adequately opacified for evaluation.   No evidence of intraluminal filling defect to suggest pulmonary embolism. Main pulmonary artery is normal in caliber. Mediastinum: No evidence of mediastinal lymphadenopathy. The heart and pericardium demonstrate no acute abnormality. There is no acute abnormality of the thoracic aorta. Lungs/pleura: The lungs are without acute process. No focal consolidation or pulmonary edema. No evidence of pleural effusion or pneumothorax. The endotracheal tube tip is in the right mainstem bronchus and should be retracted 5 cm Upper Abdomen: Limited images of the upper abdomen are unremarkable. NGT is in good position. Soft Tissues/Bones: No acute bone or soft tissue abnormality. No evidence of pulmonary embolism or acute pulmonary abnormality. No evidence of aortic dissection or aneurysm. Endotracheal tube tip is in the right mainstem bronchus and should be retracted 5 cm     XR ABDOMEN FOR NG/OG/NE TUBE PLACEMENT    Result Date: 5/12/2022  EXAMINATION: ONE SUPINE XRAY VIEW(S) OF THE ABDOMEN; ONE XRAY VIEW OF THE CHEST 5/12/2022 10:41 pm COMPARISON: None. HISTORY: ORDERING SYSTEM PROVIDED HISTORY: Confirmation of course of NG/OG/NE tube and location of tip of tube TECHNOLOGIST PROVIDED HISTORY: Reason for exam:->Confirmation of course of NG/OG/NE tube and location of tip of tube Portable? ->Yes What reading provider will be dictating this exam?->CRC FINDINGS: CHEST: Endotracheal tube extends into the right mainstem bronchus. Recommend retraction of 4-5 cm. There are ill-defined interstitial opacities bilaterally no obvious pleural effusion or pneumothorax. Cardiac silhouette is mildly prominent. Mild central pulmonary vascular congestion. Osseous and thoracic soft tissue structures demonstrate no acute findings. UPPER ABDOMEN: Enteric tube extends subdiaphragmatic with distal tip and side port projecting over the left upper quadrant. Partially imaged upper abdomen is unremarkable. 1.  Endotracheal tube extends into the right mainstem bronchus. Recommend retraction as above. 2.  Satisfactory position of the enteric tube. 3.  Faint interstitial opacities identified bilaterally. Prominence of the cardiac silhouette. Telemetry: sinus tachycardia     Resident's Assessment and Plan     Shilpa Thibodeaux is a 40 y.o. female with PMHx of polysubstance abuse (cannabis, opioids, fentanyl) who presented after having a cardiac arrest. Currently being managed for:     Hospital Day 1   MICU Day 1     Assessment:  1. Cardiac arrest likely 2/2 respiratory arrest after drug overdose - unknown downtime, CPR around 20 to 30 minutes total, unclear how many rounds of epi, UDS positive for cocaine/fentanyl/cannabis  2. Acute metabolic encephalopathy likely 2/2 #1 - CT head on admission showing diffuse brain edema concerning for anoxic brain injury  3. Leukocytosis possibly reactive, rule out infection, Trending up 12 --> 26  4. HAGMA 2/2 lactic acidosis  Improved   5. History of polysubstance abuse      Plan  · Start Keppra. · Started on 3% NS with goal Na of <155. Neuroprotective measures. Keep patient euvolemic. Keep euglycemic. Keep normotensive. Keep Normoxia and normocapnia. · Continue Zosyn and Vancomycin  · Thiamine daily . · Follow MRI brain and EEG  · Trend lactic acid  · Follow urine, blood, respiratory cultures  · Follow procalcitonin  · Partner wants full code- unclear who is POA. Consider Palliative.  F/u Na Q2 hourly   F/u BMP Q4 hourly   Neuro consult   Versed and Fentanyl for sedation. Daily sedation vacation.  [facility] for prophylaxis 2/2 blood from NG. Check H&H tonight. PCDs for ppx.      PT/OT evaluation: not indicated   DVT prophylaxis/ GI prophylaxis: PCDs/ppi   Disposition: MICU     Lakeshauncion Siemens, DO, PGY-2  Attending physician: Dr. Maura Roth  Department of Pulmonary, Critical Care and Critical access hospital[facility]  Department of Internal Medicine      During multidisciplinary team rounds Rashmi Brito is a 40 y.o. female was seen, examined and discussed. This is confirmation that I have personally seen and examined the patient and that the key elements of the encounter were performed by me (> 85 % time). The medications & laboratory data was discussed and adjusted where necessary. The radiographic images were reviewed or with radiologist or consultant if felt dis-concordant with the exam or history. The above findings were corroborated, plans confirmed and changes made if needed. Family is updated at the bedside as available. Key issues of the case were discussed among consultants. Critical Care time is documented if appropriate.       Ayana Gibson, DO, FACP, FCCP, [place],

## 2022-05-13 NOTE — PROGRESS NOTES
EEG study was ordered, but patient's hair to matted to do the study. Nurse notified and saw the hair.   Caleb Woodard

## 2022-05-13 NOTE — PLAN OF CARE
Problem: Respiratory - Adult  Goal: Achieves optimal ventilation and oxygenation  5/13/2022 1922 by Cr Carter RCP  Outcome: Progressing

## 2022-05-13 NOTE — H&P
[facility]  Internal Medicine Residency Program  History and Physical    Patient:  Melissa Mcallister 40 y.o. female MRN: [id number]     Date of Service: 2022    Hospital Day: 2      Chief complaint: had concerns including Cardiac Arrest (Cardiac arrest, Hx of opiate OD. EMS arrived around 5 and pt unresponsive, in asystole. Epi admin x3 and ROSC achieved after 15 min. Pt went into [facility] PTA and received another dose of epi. 2mg narcan. R humeral IO.). History of Present Illness   The patient is a 40 y.o. female with PMHx of polysubstance abuse (cannabis, opioids, fentanyl) who presented after having a cardiac arrest.  Patient was found by EMS unresponsive at around 9:35 PM on . Patient was in asystole at that time and ROSC was achieved 15 minutes however they lost pulse again after few minutes. Patient was then brought to the ED where CPR was continued. Unclear how many rounds of epinephrine the patient received. She was then subsequently intubated at the ED. Partner and son at bedside during exam.  Patient was on no sedation and did not withdraw to pain, pupils nonreactive, no cough/gag reflex, negative Babinski sign bilaterally. Labs showed hypokalemia, NAGMA with lactic acidosis 8.5, AST/ALT in the 100s, WBC 12, hemoglobin 10.8. UDS positive for cocaine, fentanyl, and cannabis. CT head showed diffuse cerebral edema concerning for hypoxic brain injury given recent cardiac arrest.  CXR and CTA chest unremarkable. Patient is to be admitted to the ICU for further management. Partner wishes the patient remain full code at this time. Past Medical History:  No past medical history on file. Past Surgical History:        Procedure Laterality Date     SECTION      TUBAL LIGATION         Medications Prior to Admission:    Prior to Admission medications    Medication Sig Start Date End Date Taking?  Authorizing Provider   ibuprofen (IBU) 800 MG tablet Take 1 tablet by mouth every 8 hours as needed for Pain Take with food. 8/16/19   Amarjit Maya ZENAIDA SosaN - CNP   ondansetron (ZOFRAN ODT) 4 MG disintegrating tablet Take 1 tablet by mouth every 8 hours as needed for Nausea or Vomiting 6/18/19   Carrol Herbert MD       Allergies:  Patient has no known allergies. Social History:   TOBACCO:   reports that she has been smoking cigarettes. She has been smoking about 1.00 pack per day. She has never used smokeless tobacco.  ETOH:   reports no history of alcohol use. Family History:   No family history on file.     REVIEW OF SYSTEMS:    · Unable to assess given patient's current condition    Physical Exam   · Vitals: BP (!) 158/121   Pulse 107   Resp 20   SpO2 100%     · General Appearance: Intubated, unresponsive despite being off sedation  · Skin: warm and dry, no rash or erythema  · Head: normocephalic and atraumatic  · Eyes: Pupils nonreactive to light  · Neck: supple and non-tender without mass, no thyromegaly or thyroid nodules, no cervical lymphadenopathy  · Pulmonary/Chest: clear to auscultation bilaterally- no wheezes, rales or rhonchi, normal air movement, no respiratory distress  · Cardiovascular: normal rate, regular rhythm, normal S1 and S2, no murmurs, rubs, clicks, or gallops, distal pulses intact, no carotid bruits  · Abdomen: soft, non-tender, non-distended, normal bowel sounds, no masses or organomegaly  · Extremities: no cyanosis, clubbing or edema  · Musculoskeletal: normal range of motion, no joint swelling, deformity or tenderness  · Neurologic: Unresponsive despite being off sedation, no response to painful stimuli, no cough/gag reflex, pupils nonreactive to light, negative Babinski sign bilaterally, doll's eye reflex negative    Labs and Imaging Studies   Basic Labs  Recent Labs     05/12/22  2218 05/12/22  2230   NA  --  145   K 2.94* 2.8*   CL  --  108*   CO2  --  17*   BUN  --  6   CREATININE  --  1.1*   GLUCOSE  --  295* CALCIUM  --  7.3*       Recent Labs     05/12/22  2230   WBC 12.0*   RBC 3.59   HGB 10.8*   HCT 34.7   MCV 96.7   MCH 30.1   MCHC 31.1*   RDW 14.1      MPV 10.6       Imaging Studies:     CT HEAD WO CONTRAST    Result Date: 5/12/2022  EXAMINATION: CT OF THE HEAD WITHOUT CONTRAST  5/12/2022 9:21 pm TECHNIQUE: CT of the head was performed without the administration of intravenous contrast. Automated exposure control, iterative reconstruction, and/or weight based adjustment of the mA/kV was utilized to reduce the radiation dose to as low as reasonably achievable. COMPARISON: 10/22/2018 HISTORY: ORDERING SYSTEM PROVIDED HISTORY: Evaluate intracranial abnormality TECHNOLOGIST PROVIDED HISTORY: Has a \"code stroke\" or \"stroke alert\" been called? ->No Reason for exam:->Evaluate intracranial abnormality Decision Support Exception - unselect if not a suspected or confirmed emergency medical condition->Emergency Medical Condition (MA) What reading provider will be dictating this exam?->CRC FINDINGS: BRAIN/VENTRICLES: There is diffuse cerebral and cerebellar edema with effacement of the sulci and basilar cisterns. There is also near complete effacement of the ventricles. There is loss of normal gray-white differentiation. No intracranial hemorrhage is identified. No midline shift is seen. ORBITS: The visualized portion of the orbits demonstrate no acute abnormality. SINUSES: The visualized paranasal sinuses and mastoid air cells demonstrate no acute abnormality. An endotracheal tube and an orogastric tube are noted. SOFT TISSUES/SKULL:  No acute abnormality of the visualized skull or soft tissues. Diffuse cerebral and cerebellar edema highly concerning for hypoxic-ischemic encephalopathy, particularly given patient's clinical history of cardiac arrest with prolonged resuscitation. Critical results were called by Dr. Yared Altman to [facility] on 5/12/2022 at 21:46.      XR CHEST PORTABLE    Result Date: 5/12/2022  EXAMINATION: ONE SUPINE XRAY VIEW(S) OF THE ABDOMEN; ONE XRAY VIEW OF THE CHEST 5/12/2022 10:41 pm COMPARISON: None. HISTORY: ORDERING SYSTEM PROVIDED HISTORY: Confirmation of course of NG/OG/NE tube and location of tip of tube TECHNOLOGIST PROVIDED HISTORY: Reason for exam:->Confirmation of course of NG/OG/NE tube and location of tip of tube Portable? ->Yes What reading provider will be dictating this exam?->CRC FINDINGS: CHEST: Endotracheal tube extends into the right mainstem bronchus. Recommend retraction of 4-5 cm. There are ill-defined interstitial opacities bilaterally no obvious pleural effusion or pneumothorax. Cardiac silhouette is mildly prominent. Mild central pulmonary vascular congestion. Osseous and thoracic soft tissue structures demonstrate no acute findings. UPPER ABDOMEN: Enteric tube extends subdiaphragmatic with distal tip and side port projecting over the left upper quadrant. Partially imaged upper abdomen is unremarkable. 1.  Endotracheal tube extends into the right mainstem bronchus. Recommend retraction as above. 2.  Satisfactory position of the enteric tube. 3.  Faint interstitial opacities identified bilaterally. Prominence of the cardiac silhouette. CTA CHEST W CONTRAST    Result Date: 5/12/2022  EXAMINATION: CTA OF THE CHEST 5/12/2022 11:21 pm TECHNIQUE: CTA of the chest was performed after the administration of intravenous contrast.  Multiplanar reformatted images are provided for review. MIP images are provided for review. Automated exposure control, iterative reconstruction, and/or weight based adjustment of the mA/kV was utilized to reduce the radiation dose to as low as reasonably achievable. COMPARISON: None.  HISTORY: ORDERING SYSTEM PROVIDED HISTORY: Arrest TECHNOLOGIST PROVIDED HISTORY: Reason for exam:->Arrest Decision Support Exception - unselect if not a suspected or confirmed emergency medical condition->Emergency Medical Condition (MA) What reading provider will be dictating this exam?->CRC FINDINGS: Pulmonary Arteries: Pulmonary arteries are adequately opacified for evaluation. No evidence of intraluminal filling defect to suggest pulmonary embolism. Main pulmonary artery is normal in caliber. Mediastinum: No evidence of mediastinal lymphadenopathy. The heart and pericardium demonstrate no acute abnormality. There is no acute abnormality of the thoracic aorta. Lungs/pleura: The lungs are without acute process. No focal consolidation or pulmonary edema. No evidence of pleural effusion or pneumothorax. The endotracheal tube tip is in the right mainstem bronchus and should be retracted 5 cm Upper Abdomen: Limited images of the upper abdomen are unremarkable. NGT is in good position. Soft Tissues/Bones: No acute bone or soft tissue abnormality. No evidence of pulmonary embolism or acute pulmonary abnormality. No evidence of aortic dissection or aneurysm. Endotracheal tube tip is in the right mainstem bronchus and should be retracted 5 cm     XR ABDOMEN FOR NG/OG/NE TUBE PLACEMENT    Result Date: 5/12/2022  EXAMINATION: ONE SUPINE XRAY VIEW(S) OF THE ABDOMEN; ONE XRAY VIEW OF THE CHEST 5/12/2022 10:41 pm COMPARISON: None. HISTORY: ORDERING SYSTEM PROVIDED HISTORY: Confirmation of course of NG/OG/NE tube and location of tip of tube TECHNOLOGIST PROVIDED HISTORY: Reason for exam:->Confirmation of course of NG/OG/NE tube and location of tip of tube Portable? ->Yes What reading provider will be dictating this exam?->CRC FINDINGS: CHEST: Endotracheal tube extends into the right mainstem bronchus. Recommend retraction of 4-5 cm. There are ill-defined interstitial opacities bilaterally no obvious pleural effusion or pneumothorax. Cardiac silhouette is mildly prominent. Mild central pulmonary vascular congestion. Osseous and thoracic soft tissue structures demonstrate no acute findings.  UPPER ABDOMEN: Enteric tube extends subdiaphragmatic with distal tip and side port projecting over the left upper quadrant. Partially imaged upper abdomen is unremarkable. 1.  Endotracheal tube extends into the right mainstem bronchus. Recommend retraction as above. 2.  Satisfactory position of the enteric tube. 3.  Faint interstitial opacities identified bilaterally. Prominence of the cardiac silhouette. Resident's Assessment and Plan     Zelalem Plant is a 40 y.o. female with PMHx of polysubstance abuse (cannabis, opioids, fentanyl) who presented after having a cardiac arrest.    Assessment:  1. Cardiac arrest likely 2/2 respiratory arrest after drug overdose - unknown downtime, CPR around 20 to 30 minutes total, unclear how many rounds of epi, UDS positive for cocaine/fentanyl/cannabis  2. Acute metabolic encephalopathy likely 2/2 #1 - CT head on admission showing diffuse brain edema concerning for anoxic brain injury  3. Leukocytosis possibly reactive, rule out infection  4. HAGMA 2/2 lactic acidosis  5.  History of polysubstance abuse    Plan:  · Continue ventilatory support  · Follow MRI brain and EEG  · Neurology consulted, appreciate recommendations  · Trend lactic acid  · Follow procalcitonin  · Follow urine, blood, respiratory cultures  · Updated partner and son at bedside; partner wishes patient to remain full code at this time until all family visits her      DVT prophylaxis/ GI prophylaxis: Lovenox/Protonix  Disposition: Admit to MICU      Silver Turner MD, [facility] PGY-2   Attending physician: Dr. Laurel Carballo  Case discussed with Dr. Carolyn Fraga

## 2022-05-13 NOTE — PROGRESS NOTES
Claire Mckeon 476  Internal Medicine Residency / [address]    Attending Physician Statement  I have discussed the case, including pertinent history and exam findings with the resident and the team.  I have seen and examined the patient and the key elements of the encounter have been performed by me. I agree with the assessment, plan and orders as documented by the resident. Principal Problem:    Cardiac arrest [facility])  Active Problems:    Polysubstance abuse (HCC)    Cocaine abuse ([facility])    Fentanyl poisoning of undetermined intent ([facility] [address]    KYA (acute kidney injury) ([facility] [address]    Neutrophilic leukocytosis    High anion gap metabolic acidosis    Hypokalemia    Transaminitis  Resolved Problems:    * No resolved hospital problems. *    Repeat stat CXR to verify ETT placement (right mainstem on CT and CXR). Follow lactic acidosis and monitor I/Os    Add Zosyn and Vancomycin for aspiration coverage. Cultures drawn. CT head reviewed with significant cerebral edema -- portends grim prognosis with myoclonic jerks on exam.    Right femoral TLC-- ABG drawn to verify arterial blood. Discussed with nurse and critical care team.    Remainder of medical problems as per resident note.       Nat Burns DO  Internal Medicine Residency Faculty

## 2022-05-13 NOTE — PROGRESS NOTES
Pharmacy Consultation Note  (Antibiotic Dosing and Monitoring)      Age/  Gender Height Weight IBW  Allergy Information   37 y.o./female   166 lb 3.6 oz (75.4 kg)     Patient height not recorded   Patient has no known allergies. Renal Function:  Recent Labs     05/12/22  2230 05/13/22  0423   BUN 6 9   CREATININE 1.1* 1.1*       Intake/Output Summary (Last 24 hours) at 5/13/2022 1030  Last data filed at 5/13/2022 0830  Gross per 24 hour   Intake --   Output 400 ml   Net -400 ml       Vancomycin Monitoring:  Trough:  No results for input(s): VANCOTROUGH in the last 72 hours. Random:  No results for input(s): VANCORANDOM in the last 72 hours. Vancomycin Administration Times:  Recent vancomycin administrations      No vancomycin IV orders with administrations found. Orders not given:          vancomycin 1500 mg in dextrose 5% 300 mL IVPB                Plan:  · Discussed with intensivist - will give vancomycin 1500 mg VI q24 x3 doses for now and sign off. Please re-consult if further assistance is needed.     Nina Hoffman, WilliamD, BCPS 5/13/2022 10:31 AM

## 2022-05-14 NOTE — PROGRESS NOTES
[address] [facility]  Department of Internal Medicine   Internal Medicine Residency   MICU Progress Note    Patient:  Billie Lyman 40 y.o. female  MRN: [id number]     Date of Service: 5/14/2022    Allergy: Patient has no known allergies. Subjective     No acute events overnight. Patient seen examined bedside this morning. She remains sedated with fentanyl and Versed. Patient unresponsive today. Versed was turned off to reexamine neurologic status. Objective     VS: /71   Pulse 114   Temp 99.9 °F (37.7 °C) (Esophageal)   Resp 14   Wt 166 lb (75.3 kg)   SpO2 97%   BMI 24.51 kg/m²           I & O - 24hr:     Intake/Output Summary (Last 24 hours) at 5/14/2022 1134  Last data filed at 5/14/2022 1100  Gross per 24 hour   Intake 1343.5 ml   Output 3345 ml   Net -2001.5 ml       Physical Exam:  · General Appearance: Intubated, sedated, unresponsive  · Neck: no adenopathy, no carotid bruit, no JVD, supple, symmetrical, trachea midline and thyroid not enlarged, symmetric, no tenderness/mass/nodules  · Lung: clear to auscultation bilaterally  · Heart: regular rate and rhythm, S1, S2 normal, no murmur, click, rub or gallop  · Abdomen: soft, non-tender; bowel sounds normal; no masses,  no organomegaly  · Extremities:  extremities normal, atraumatic, no cyanosis or edema  · Musculoskeletal: No joint swelling, no muscle tenderness. ROM normal in all joints of extremities.    · Neurologic: Mental status: Sedated, pupils nonreactive to light, no cough/gag, no response to painful stimuli    Lines     site day    Art line   None    TLC R SC 1   PICC None    Hemoaccess None      Mechanical Ventilation:   Mode: A/C    TV:350 ml RR: 18  PEEP 8cmH2O FiO2 40    ABG:     Lab Results   Component Value Date    PH 7.411 05/14/2022    PCO2 34.5 05/14/2022    PO2 105.0 05/14/2022    HCO3 21.4 05/14/2022    BE -2.5 05/14/2022    THB 13.5 05/14/2022    O2SAT 97.9 05/14/2022        Medications     Infusions: (Fluid, Sedation, Vasopressors)  IVF:    NS 3%  Vasopressors   None  Sedation   Fentanyl    Nutrition:   NPO    ATB:   Antibiotics  Days   Vancomycin 1   Zosyn 1           Labs     CBC with Differential:    Lab Results   Component Value Date    WBC 22.5 05/14/2022    RBC 4.24 05/14/2022    HGB 12.8 05/14/2022    HCT 38.7 05/14/2022     05/14/2022    MCV 91.3 05/14/2022    MCH 30.2 05/14/2022    MCHC 33.1 05/14/2022    RDW 14.7 05/14/2022    METASPCT 0.9 05/12/2022    LYMPHOPCT 1.7 05/14/2022    MONOPCT 2.6 05/14/2022    BASOPCT 0.1 05/14/2022    MONOSABS 0.68 05/14/2022    LYMPHSABS 0.45 05/14/2022    EOSABS 0.00 05/14/2022    BASOSABS 0.00 05/14/2022     CMP:    Lab Results   Component Value Date     05/14/2022    K 4.1 05/14/2022     05/14/2022    CO2 22 05/14/2022    BUN 13 05/14/2022    CREATININE 1.1 05/14/2022    GFRAA >60 05/14/2022    LABGLOM 56 05/14/2022    GLUCOSE 133 05/14/2022    PROT 4.9 05/12/2022    LABALBU 3.2 05/12/2022    CALCIUM 8.8 05/14/2022    BILITOT 0.3 05/12/2022    ALKPHOS 69 05/12/2022     05/12/2022     05/12/2022       Resident's Assessment and Plan     Nancy Burrell is a 40 y.o. female with PMHx of polysubstance abuse (cannabis, opioids, fentanyl) who presented after having a cardiac arrest.     Assessment:  1. Cardiac arrest likely 2/2 respiratory arrest after drug overdose - unknown downtime, CPR around 20 to 30 minutes total, unclear how many rounds of epi, UDS positive for cocaine/fentanyl/cannabis  2. Acute metabolic encephalopathy likely 2/2 #1 - CT head on admission showing diffuse brain edema concerning for anoxic brain injury  3. Leukocytosis possibly reactive, rule out infection  4. HAGMA 2/2 lactic acidosis  5.  History of polysubstance abuse     Plan:  · Stop all sedation today  · On NS 3% to maintain sodium less than 155, follow q2h  · Continue ventilatory support  · Follow MRI brain and EEG  · Keep pCO2 30-35; at goal  · Neurology consulted, appreciate recommendations  · Repeat blood cultures  · Palliative care consulted, appreciate recommendations      DVT prophylaxis: PCD  GI prophylaxis: PPI      Pal Jaimes MD, ASTRID PGY-2  Attending physician: Dr. Carmen Elias  Department of Pulmonary, Critical Care and Sleep Medicine  [address] [facility]  Department of Internal Medicine      During multidisciplinary team rounds Debbie Samson is a 40 y.o. female was seen, examined and discussed. This is confirmation that I have personally seen and examined the patient and that the key elements of the encounter were performed by me (> 85 % time). The medications & laboratory data was discussed and adjusted where necessary. The radiographic images were reviewed or with radiologist or consultant if felt dis-concordant with the exam or history. The above findings were corroborated, plans confirmed and changes made if needed. Family is updated at the bedside as available. Key issues of the case were discussed among consultants. Critical Care time is documented if appropriate.       Gigi Webber, DO, FACP, FCCP, [place],

## 2022-05-14 NOTE — PROGRESS NOTES
Claire Mckeon [facility]  Internal Medicine Residency Program  Progress Note - House Team     Patient:  Selmer Ormond 40 y.o. female MRN: [id number]     Date of Service: 5/14/2022     CC: Cardiac arrest       Subjective     Patient was seen and examined this morning at bedside and she remains in critical condition. She is not responsive to painful stimuli, reflexes are still absent. Sedation were turned off in the morning. Blood culture 1 bottle positive for gram-positive cocci    Objective     Physical Exam:  · Vitals: /66   Pulse 114   Temp 99.8 °F (37.7 °C) (Esophageal)   Resp 23   Wt 166 lb (75.3 kg)   SpO2 99%   BMI 24.51 kg/m²     · I & O - 24hr: I/O this shift:  · In: -   · Out: 665 [Urine:665]   · General Appearance: Intubated, on the vent, unresponsive  · HEENT:  Head: Normal, normocephalic, atraumatic. · Neck: no adenopathy, no carotid bruit, no JVD, supple, symmetrical, trachea midline and thyroid not enlarged, symmetric, no tenderness/mass/nodules  · Lung: clear to auscultation bilaterally  · Heart: regular rate and rhythm, S1, S2 normal, no murmur, click, rub or gallop  · Abdomen: soft, non-tender; bowel sounds normal; no masses,  no organomegaly  · Extremities:  extremities normal, atraumatic, no cyanosis or edema  · Musculokeletal: No joint swelling, no muscle tenderness. ROM normal in all joints of extremities.    · Neurologic: Mental status: Intubated on the vent, nonresponsive to pain, pupil not reactive to light, no gag or cough reflexes, no corneal reflexes  Subject  Pertinent Labs & Imaging Studies   isaias  CBC:   Lab Results   Component Value Date    WBC 22.5 05/14/2022    RBC 4.24 05/14/2022    HGB 12.8 05/14/2022    HCT 38.7 05/14/2022    MCV 91.3 05/14/2022    MCH 30.2 05/14/2022    MCHC 33.1 05/14/2022    RDW 14.7 05/14/2022     05/14/2022    MPV 11.1 05/14/2022     CBC with Differential:    Lab Results   Component Value Date    WBC 22.5 05/14/2022    RBC 4.24 05/14/2022    HGB 12.8 05/14/2022    HCT 38.7 05/14/2022     05/14/2022    MCV 91.3 05/14/2022    MCH 30.2 05/14/2022    MCHC 33.1 05/14/2022    RDW 14.7 05/14/2022    METASPCT 0.9 05/12/2022    LYMPHOPCT 1.7 05/14/2022    MONOPCT 2.6 05/14/2022    BASOPCT 0.1 05/14/2022    MONOSABS 0.68 05/14/2022    LYMPHSABS 0.45 05/14/2022    EOSABS 0.00 05/14/2022    BASOSABS 0.00 05/14/2022     WBC:    Lab Results   Component Value Date    WBC 22.5 05/14/2022     Platelets:    Lab Results   Component Value Date     05/14/2022     Hemoglobin/Hematocrit:    Lab Results   Component Value Date    HGB 12.8 05/14/2022    HCT 38.7 05/14/2022     CMP:    Lab Results   Component Value Date     05/14/2022    K 4.1 05/14/2022     05/14/2022    CO2 22 05/14/2022    BUN 13 05/14/2022    CREATININE 1.1 05/14/2022    GFRAA >60 05/14/2022    LABGLOM 56 05/14/2022    GLUCOSE 133 05/14/2022    PROT 4.9 05/12/2022    LABALBU 3.2 05/12/2022    CALCIUM 8.8 05/14/2022    BILITOT 0.3 05/12/2022    ALKPHOS 69 05/12/2022     05/12/2022     05/12/2022     BMP:    Lab Results   Component Value Date     05/14/2022    K 4.1 05/14/2022     05/14/2022    CO2 22 05/14/2022    BUN 13 05/14/2022    LABALBU 3.2 05/12/2022    CREATININE 1.1 05/14/2022    CALCIUM 8.8 05/14/2022    GFRAA >60 05/14/2022    LABGLOM 56 05/14/2022    GLUCOSE 133 05/14/2022     Sodium:    Lab Results   Component Value Date     05/14/2022     Potassium:    Lab Results   Component Value Date    K 4.1 05/14/2022     BUN/Creatinine:    Lab Results   Component Value Date    BUN 13 05/14/2022    CREATININE 1.1 05/14/2022     Hepatic Function Panel:    Lab Results   Component Value Date    ALKPHOS 69 05/12/2022     05/12/2022     05/12/2022    PROT 4.9 05/12/2022    BILITOT 0.3 05/12/2022    LABALBU 3.2 05/12/2022     Albumin:    Lab Results   Component Value Date    LABALBU 3.2 05/12/2022     Calcium:    Lab Results Component Value Date    CALCIUM 8.8 05/14/2022     Ionized Calcium:  No results found for: IONCA  Magnesium:    Lab Results   Component Value Date    MG 2.2 05/14/2022     Phosphorus:    Lab Results   Component Value Date    PHOS 3.4 05/14/2022     LDH:  No results found for: LDH  Uric Acid:  No results found for: LABURIC, URICACID  PT/INR:    Lab Results   Component Value Date    PROTIME 11.6 05/13/2022    INR 1.1 05/13/2022     Warfarin PT/INR:  No components found for: PTPATWAR, PTINRWAR  PTT:  No results found for: APTT, PTT[APTT}  Troponin:  No results found for: TROPONINI  Last 3 Troponin:  No results found for: TROPONINI  U/A:    Lab Results   Component Value Date    COLORU Yellow 05/12/2022    PROTEINU >=300 05/12/2022    PHUR 7.0 05/12/2022    WBCUA 1-3 05/12/2022    RBCUA 10-20 05/12/2022    MUCUS Present 06/17/2019    BACTERIA MANY 05/12/2022    CLARITYU SL CLOUDY 05/12/2022    SPECGRAV 1.020 05/12/2022    LEUKOCYTESUR Negative 05/12/2022    UROBILINOGEN 1.0 05/12/2022    BILIRUBINUR Negative 05/12/2022    BLOODU MODERATE 05/12/2022    GLUCOSEU 250 05/12/2022       XR CHEST PORTABLE   Final Result   Satisfactory line placements. No pneumothorax and no acute disease. CT HEAD WO CONTRAST   Final Result   Diffuse cerebral and cerebellar edema highly concerning for hypoxic-ischemic   encephalopathy, particularly given patient's clinical history of cardiac   arrest with prolonged resuscitation. Critical results were called by Dr. Wilkins [facility] to [place] on   5/12/2022 at 21:46. CTA CHEST W CONTRAST   Final Result   No evidence of pulmonary embolism or acute pulmonary abnormality. No evidence of aortic dissection or aneurysm. Endotracheal tube tip is in the right mainstem bronchus and should be   retracted 5 cm         XR CHEST PORTABLE   Final Result   1. Endotracheal tube extends into the right mainstem bronchus. Recommend   retraction as above.       2. Satisfactory position of the enteric tube. 3.  Faint interstitial opacities identified bilaterally. Prominence of the   cardiac silhouette. XR ABDOMEN FOR NG/OG/NE TUBE PLACEMENT   Final Result   1. Endotracheal tube extends into the right mainstem bronchus. Recommend   retraction as above. 2.  Satisfactory position of the enteric tube. 3.  Faint interstitial opacities identified bilaterally. Prominence of the   cardiac silhouette.          MRI BRAIN WO CONTRAST    (Results Pending)        Resident's Assessment and Plan     Assessment and Plan:    Cardiac arrest likely secondary to respiratory arrest after drug overdose  Acute metabolic encephalopathy likely secondary to #1,   Leukocytosis secondary to infection  HAGMA secondary to lactic acidosis  KYA secondary to hypoperfusion  History of polysubstance abuse    Plan  Stopped all sedation today  Neurology on board appreciate the recommendations  On normal saline 3%, maintain sodium less than 155  Check sodium every 2 hours  Follow-up brain MRI and EEG  Keep PCO2 between 30 and 35  Repeat blood cultures  Palliative care consulted, appreciate the inputs  Continue vancomycin and Zosyn    PT/OT evaluation: none  DVT prophylaxis/ GI prophylaxis: PCD/PPI  Disposition: continue current care     Bhavin Obrien MD, PGY-1  Attending physician: Dr. Jannette Robertson

## 2022-05-14 NOTE — PROGRESS NOTES
Frank Xiao is a 40 y.o. right handed female     Patient was brought to the emergency department on 5/12/2022 late evening. Reportedly had cardiac arrested 30 minutes prior to arrival.    Upon EMS arrival patient was reportedly in asystole and was in asystole for approximately 15 minutes before ROSC was achieved. However patient then again lost pulses with EMS and CPR continued in the emergency department. She did receive Narcan in the field for she has a history of polysubstance abuse and urine drug screen was positive for fentanyl and cocaine as well as cannabinoids on arrival to ED. CT of her head was obtained which demonstrated marked cerebral edema    She continues on sedation this morning    No witnessed seizure activity has been reported however she remains on Keppra 500 mg twice a day presumably prophylactically.     Allergies as of 05/12/2022    (No Known Allergies)     Objective:     /74   Pulse 115   Temp 99.5 °F (37.5 °C) (Axillary)   Resp 21   Wt 166 lb (75.3 kg)   SpO2 100%   BMI 24.51 kg/m²      General appearance: Not awake  Head: Normocephalic, without obvious abnormality, atraumatic  Extremities: no cyanosis or edema  Pulses: 2+ and symmetric  Skin: no rashes or lesions    Mental Status: Not awake-not following commands-remains on Versed and fentanyl    Cranial Nerves:  I: smell    II: visual acuity     II: visual fields  no threat   II: pupils  pupils not reactive but appears symmetrical   III,VII: ptosis    III,IV,VI: extraocular muscles   doll's are not present   V: mastication    V: facial light touch sensation     V,VII: corneal reflex   not present   VII: facial muscle function - upper     VII: facial muscle function - lower Normal   VIII: hearing    IX: soft palate elevation     IX,X: gag reflex  not present   XI: trapezius strength     XI: sternocleidomastoid strength    XI: neck extension strength     XII: tongue strength       No spontaneous movement    No grimacing or withdrawal to noxious stimulation    DTR:   No reflexes    No Babinski  No Tafoya's     Laboratory/Radiology:     CBC with Differential:    Lab Results   Component Value Date    WBC 22.5 05/14/2022    RBC 4.24 05/14/2022    HGB 12.8 05/14/2022    HCT 38.7 05/14/2022     05/14/2022    MCV 91.3 05/14/2022    MCH 30.2 05/14/2022    MCHC 33.1 05/14/2022    RDW 14.7 05/14/2022    METASPCT 0.9 05/12/2022    LYMPHOPCT 1.7 05/14/2022    MONOPCT 2.6 05/14/2022    BASOPCT 0.1 05/14/2022    MONOSABS 0.68 05/14/2022    LYMPHSABS 0.45 05/14/2022    EOSABS 0.00 05/14/2022    BASOSABS 0.00 05/14/2022     CMP:    Lab Results   Component Value Date     05/14/2022    K 3.8 05/14/2022     05/14/2022    CO2 24 05/14/2022    BUN 12 05/14/2022    CREATININE 1.2 05/14/2022    GFRAA >60 05/14/2022    LABGLOM 51 05/14/2022    GLUCOSE 138 05/14/2022    PROT 4.9 05/12/2022    LABALBU 3.2 05/12/2022    CALCIUM 8.8 05/14/2022    BILITOT 0.3 05/12/2022    ALKPHOS 69 05/12/2022     05/12/2022     05/12/2022       UDS:  Positive for cannabinoids, cocaine and fentanyl    CT Head:  Diffuse cerebral and cerebellar edema highly concerning for hypoxic-ischemic  encephalopathy, particularly given patient's clinical history of cardiac  arrest with prolonged resuscitation. I personally reviewed the patient's lab and imaging studies at this time. Assessment:     Patient with marked cerebral edema most likely related to anoxic encephalopathy status postcardiac arrest for significant period of time.   She reportedly arrested 30 minutes prior to arrival and only had pulses for approximately 10 minutes per EMS/ED report until ROSC was achieved in the emergency department    History of drug abuse with urine drug screen testing positive for cocaine, cannabinoids as well as fentanyl    On neuro exam at this time I find no cranial nerve or cerebral brain function however she was on Versed as well as fentanyl at the time of the examination. These were paused while neurology continued assessment however appreciated no change in her assessment. Plan:     Recommend holding Versed and fentanyl as well as other sedating medications at this time so has a more detailed assessment can be completed    Given her marked cerebral edema and current neurological assessment I fear poor neurological prognosis however cannot make that determination at this time given her recent cessation of sedation.       Anton Matthews, APRN - CNS  9:43 AM  5/14/2022

## 2022-05-14 NOTE — FLOWSHEET NOTE
1445 MRI screening completed,information given by Windy Barrett who states she is the patient's sister. MRI notified that the form is completed.

## 2022-05-14 NOTE — CONSULTS
Palliative Care Department  [phone]  Palliative Care Initial Consult  Provider ANY Orozco CNP    Praveen Balderas  [id number]  Hospital Day: 3  Date of Initial Consult: 5/14/2022  Referring Provider: Dr. Zabrina Bee was consulted for assistance with: Goals of care, CODE STATUS discussion and family support    HPI:   Praveen Balderas is a 40 y.o. with a medical history of polysubstance abuse (cannabis, opioids and fentanyl) who was admitted on 5/12/2022 from home with a CHIEF COMPLAINT of cardiac arrest. Patient found by EMS unresponsive. At that time patient was in asystole. ROSC was achieved in 15 minutes however they lost pulse again after few minutes. Patient brought to ED where CPR was continued. Unclear how many rounds of epi the patient received. Patient was subsequently intubated and admitted to ICU. CT head showed diffuse cerebral edema concerning for hypoxic brain injury. UDS positive for cocaine, fentanyl and cannabis. ASSESSMENT/PLAN:     Pertinent Hospital Diagnoses      Cardiac arrest likely 2/2 respiratory arrest after drug overdose   Acute metabolic encephalopathy likely 2/2 above    Palliative Care Encounter / Counseling Regarding Goals of Care  Please see detailed goals of care discussion as below   At this time, Praveen Balderas, Does Not have capacity for medical decision-making. Capacity is time limited and situation/question specific   During encounter Crystal was surrogate medical decision-maker   Outcome of goals of care meeting:   - Continue current medical treatment   Code status Full Code   Advanced Directives: no POA or living will in [facility]   Surrogate/Legal NOK:  o Tomichael Anderson [phone]      Spiritual assessment: no spiritual distress identified  Bereavement and grief: to be determined  Referrals to: none today    SUBJECTIVE:   Details of Conversation:    Chart reviewed. Patient seen intubated in ICU. Not no sedation.   Unresponsive to verbal or painful stimuli. Spoke with patient's cousin, [employer] on the phone. Edwina states Nurys's mother has not been involved in the patient's life since she was 4years old. Edwina contacted patient's mother yesterday and mother stated she did not want to make any decisions for her daughter. Also spoke with Nirmala Izaguirre on the phone. Nirmala Izaguirre is the father of Nurys's children. Children are ages 13 and 12. Edwina and Nirmala Izaguirre state they will work together on making decisions regarding Yaimas care, however [employer] is legally the next of kin and has the right to make medical decisions. At this time both agree to keep patient full code and continue all aggressive medical treatment. Emotional support given and all questions addressed. Both parties have my number in case they have any additional questions or concerns. Will follow for continued discussion regarding CODE STATUS and goals of care as well as support for the patient and families.     OBJECTIVE:   Prognosis: Poor    Physical Exam:  /66   Pulse 114   Temp 99.8 °F (37.7 °C) (Esophageal)   Resp 23   Ht 5' 9\" (1.753 m)   Wt 166 lb (75.3 kg)   SpO2 99%   BMI 24.51 kg/m²   Constitutional: Intubated, unresponsive  Eyes: no scleral icterus, normal lids, no discharge  ENMT:  Normocephalic, atraumatic, mucosa moist, EOMI  Neck:  trachea midline, no JVD  Lungs:  CTA bilaterally, no audible rhonchi or wheezes noted, respirations unlabored, no retractions  Heart[de-identified]  RRR, distant heart tones, no murmur, rub, or gallop noted during exam  Abd:  Soft, non tender, non distended, bowel sounds present  Ext: Unresponsive  Skin:  Cool and dry, no rashes on visible skin  Psych: non-anxious affect  Neuro: Unresponsive    Objective data reviewed: labs, images, records, medication use, vitals and chart    Discussed patient and the plan of care with the other IDT members: Palliative Medicine IDT Team    Time/Communication  Greater than 50% of time spent, total 50 minutes in counseling and coordination of care at the bedside regarding goals of care. Thank you for allowing Palliative Medicine to participate in the care of Chris Burkett.

## 2022-05-14 NOTE — PLAN OF CARE
Problem: Discharge Planning  Goal: Discharge to home or other facility with appropriate resources  Outcome: Progressing     Problem: Pain  Goal: Verbalizes/displays adequate comfort level or baseline comfort level  5/14/2022 0050 by Matt Page RN  Outcome: Progressing  5/13/2022 1253 by Glen Barnard RN  Outcome: Progressing     Problem: Skin/Tissue Integrity  Goal: Absence of new skin breakdown  Description: 1. Monitor for areas of redness and/or skin breakdown  2. Assess vascular access sites hourly  3. Every 4-6 hours minimum:  Change oxygen saturation probe site  4. Every 4-6 hours:  If on nasal continuous positive airway pressure, respiratory therapy assess nares and determine need for appliance change or resting period.   5/14/2022 0050 by Matt Page RN  Outcome: Progressing  5/13/2022 1253 by Glen Barnard RN  Outcome: Progressing     Problem: Safety - Adult  Goal: Free from fall injury  5/14/2022 0050 by Matt Page RN  Outcome: Progressing  5/13/2022 1253 by Glen Barnard RN  Outcome: Progressing     Problem: Respiratory - Adult  Goal: Achieves optimal ventilation and oxygenation  5/14/2022 0050 by Matt Page RN  Outcome: Progressing  5/13/2022 1922 by Lynne Tovar RCP  Outcome: Progressing  5/13/2022 1421 by Narendra Flores RCP  Outcome: Progressing

## 2022-05-14 NOTE — PROGRESS NOTES
Comprehensive Nutrition Assessment    Type and Reason for Visit:  Initial (vent)    Nutrition Recommendations/Plan:   1. Continue NPO, TF recs provided if needed for EN initiation when/if medically appropriate. 2. Will monitor for nutrition progression/provide updated TF recs as needed. Recommend: Standard formula with fiber (Jevity 1.5) + 1 protein mod @50ml/hr will provide: 1200ml TV, 1800kcal, 76g pro (1904 kcal , 103g pro with mod) 912ml Free water. Flush per critical care. Malnutrition Assessment:  Malnutrition Status:  Insufficient data (05/14/22 1342)    Context:  Acute Illness     Findings of the 6 clinical characteristics of malnutrition:  Energy Intake:  Mild decrease in energy intake (Comment)  Weight Loss:  Unable to assess (d/t lack of wt hx on file to assess)     Body Fat Loss:  Unable to assess     Muscle Mass Loss:  Unable to assess    Fluid Accumulation:  No significant fluid accumulation     Strength:  Not Performed    Nutrition Assessment:    Pt. admit d/t Cardiac arrest likely 2/2 respiratory arrest after drug overdose; unknown downtime. Pt. remains intubated w/ acute metabolic encephalopathy ; sedation off. Noted KYA/Leukocytosis /HAGMA 2/2 lactic acidosis. PMHx of polysubstance abuse. Will monitor for nutrition progression. Nutrition Related Findings:    Unresponsive, intubated, -I/O 665ml, no edema, MAP 78, hypoactive BS, OG tube, hypernatremia Wound Type: None       Current Nutrition Intake & Therapies:    Average Meal Intake: NPO  Average Supplements Intake: None Ordered  Diet NPO    Anthropometric Measures:  Height: 5' 9\" (175.3 cm)  Ideal Body Weight (IBW): 145 lbs (66 kg)    Admission Body Weight: 166 lb 3.6 oz (75.4 kg) (5/13 no method)  Current Body Weight: 166 lb 3.6 oz (75.4 kg) (5/13 no method), 114.6 % IBW.  Weight Source: Not Specified  Current BMI (kg/m2): 24.5  Usual Body Weight:  (MENDOZA d/t lack of wt hx on file to assess)                       BMI Categories: Normal Weight (BMI 18.5-24. 9)    Estimated Daily Nutrient Needs:  Energy Requirements Based On: Formula     Energy (kcal/day): EA7Q(3148) 1900-2000kcal  Weight Used for Protein Requirements: Current  Protein (g/day): 98-112g (1.3-1.5g/kgCBW)  Method Used for Fluid Requirements: Other (Comment)  Fluid (ml/day): per critical care management    Nutrition Diagnosis:   · Inadequate oral intake related to impaired respiratory function (s/p cardiac/respiratory arrest) as evidenced by NPO or clear liquid status due to medical condition,intubation      Nutrition Interventions:   Food and/or Nutrient Delivery: Continue NPO  Nutrition Education/Counseling: No recommendation at this time  Coordination of Nutrition Care: Continue to monitor while inpatient       Goals:     Goals: Initiate nutrition support,by next RD assessment       Nutrition Monitoring and Evaluation:   Behavioral-Environmental Outcomes: None Identified  Food/Nutrient Intake Outcomes: Diet Advancement/Tolerance  Physical Signs/Symptoms Outcomes: Biochemical Data,Hemodynamic Status,Nutrition Focused Physical Findings,Skin,Weight    Discharge Planning:     Too soon to determine     Paulette Stokes RD  Contact: ext [phone]

## 2022-05-15 NOTE — PROGRESS NOTES
Pt increasingly tachycardiac and hypertensive with increased urine output. Absent reflexes. Dr. Whittaker Frames informed. Will continue to monitor.

## 2022-05-15 NOTE — PROGRESS NOTES
[address] [facility]  Department of Internal Medicine   Internal Medicine Residency   MICU Progress Note    Patient:  Shilpa Thibodeaux 40 y.o. female  MRN: [id number]     Date of Service: 5/15/2022    Allergy: Patient has no known allergies. Subjective     · 24 hour change:   · UOP very high with clear urine (1L in 2h), Sodium rising, given DDAVP 1 mcg  · MRSA nares positive, started bactroban  · Gastroccult positive, having dark fluid suctioned through NG, Hgb 10.9  · Borderline BP, MAP 64-65, given 250 cc LR bolus, stable  · PCO2 was 43, increased RR to 20, repeat PCO2 35-36  · Sodium 151 this AM Phos 1.4, replaced, repeat ordered for 10AM. K 3.8 - getting K phos    Pt seen and examined this AM. All sedation off. Cough/gag/ corneal reflex not present. Doll's eyes. Objective     VS: /72   Pulse 78   Temp 96.3 °F (35.7 °C) (Esophageal)   Resp 20   Ht 5' 9\" (1.753 m)   Wt 167 lb (75.8 kg)   SpO2 99%   BMI 24.66 kg/m²           I & O - 24hr:     Intake/Output Summary (Last 24 hours) at 5/15/2022 0746  Last data filed at 5/15/2022 7624  Gross per 24 hour   Intake 1112.57 ml   Output 1815 ml   Net -702.43 ml       Physical Exam:  · General Appearance: Intubated, off all sedation, unresponsive  · Neck: no adenopathy, no carotid bruit, no JVD, supple, symmetrical, trachea midline and thyroid not enlarged, symmetric, no tenderness/mass/nodules  · Lung: clear to auscultation bilaterally  · Heart: regular rate and rhythm, S1, S2 normal, no murmur, click, rub or gallop  · Abdomen: soft, non-tender; bowel sounds normal; no masses,  no organomegaly  · Extremities:  extremities normal, atraumatic, no cyanosis or edema  · Musculoskeletal: No joint swelling, no muscle tenderness. ROM normal in all joints of extremities.    · Neurologic: Mental status: off sedation, pupils nonreactive to light, no cough/gag, no response to painful stimuli    Lines     site day    Art line   None    TLC R SC 3   PICC None Hemoaccess None      Mechanical Ventilation:   Mode: A/C    TV:350 ml RR: 20  PEEP 8cmH2O FiO2 40    ABG:     Lab Results   Component Value Date    PH 7.452 05/15/2022    PCO2 36.1 05/15/2022    PO2 70.2 05/15/2022    HCO3 24.6 05/15/2022    BE 0.9 05/15/2022    THB 11.8 05/15/2022    O2SAT 94.9 05/15/2022        Medications     Infusions: (Fluid, Sedation, Vasopressors)  IVF:    None   Vasopressors   None  Sedation   None    Nutrition:   NPO    ATB:   Antibiotics  Days   Vancomycin 2   Zosyn 2           Labs     CBC with Differential:    Lab Results   Component Value Date    WBC 21.0 05/15/2022    RBC 3.58 05/15/2022    HGB 10.8 05/15/2022    HCT 34.5 05/15/2022     05/15/2022    MCV 96.4 05/15/2022    MCH 30.2 05/15/2022    MCHC 31.3 05/15/2022    RDW 15.4 05/15/2022    METASPCT 0.9 05/12/2022    LYMPHOPCT 6.9 05/15/2022    MONOPCT 5.9 05/15/2022    BASOPCT 0.1 05/15/2022    MONOSABS 1.24 05/15/2022    LYMPHSABS 1.44 05/15/2022    EOSABS 0.01 05/15/2022    BASOSABS 0.02 05/15/2022     CMP:    Lab Results   Component Value Date     05/15/2022    K 3.8 05/15/2022     05/15/2022    CO2 23 05/15/2022    BUN 20 05/15/2022    CREATININE 1.2 05/15/2022    GFRAA >60 05/15/2022    LABGLOM 51 05/15/2022    GLUCOSE 157 05/15/2022    PROT 4.9 05/12/2022    LABALBU 3.2 05/12/2022    CALCIUM 9.0 05/15/2022    BILITOT 0.3 05/12/2022    ALKPHOS 69 05/12/2022     05/12/2022     05/12/2022       Resident's Assessment and Plan     Nivia Vick is a 40 y.o. female with PMHx of polysubstance abuse (cannabis, opioids, fentanyl) who presented after having a cardiac arrest.     Assessment:  1. Cardiac arrest likely 2/2 respiratory arrest after drug overdose - unknown downtime, CPR around 20 to 30 minutes total, unclear how many rounds of epi, UDS positive for cocaine/fentanyl/cannabis  2.  Acute metabolic encephalopathy likely 2/2 #1 - CT head on admission showing diffuse brain edema concerning for anoxic brain injury  3. Diabetes insipidus   4. Leukocytosis possibly reactive, rule out infection  5. HAGMA 2/2 lactic acidosis  6. History of polysubstance abuse     Plan:  · Poor prognosis. · MRI at 12 pm today. · Discussed with family regarding poor prognosis. Waiting for EEG and MRI. Family wants all testing done before any further decisions can be made. · NOK is Crystal- pt's biological cousin. Pt's children are 12 y/o and 13 y/o. Pt is not . · Check Uosm, Serum osm and Urine Na. If labs consistent with DI, start DDAVP scheduled. Continue to monitor UO closely. · Continue to treat infection (E.coli UTI, MRSA colonization and Blood Cx 1 of 2 positive for GPC in clusters)   · Continue ventilatory support- switch to PS. · Keep pCO2 30-35; at goal  · Neurology consulted, appreciate recommendations  · Repeat blood cultures  · Palliative care consulted, appreciate recommendations    DVT prophylaxis: PCD  GI prophylaxis: PPI  Disposition: poor prognosis. Cont. MICU mngt. Ceci Johnson DO, PGY-2  Attending physician: Dr. Zachary Terrazas  Department of Pulmonary, Critical Care and Sleep Medicine  [facility]  Department of Internal Medicine      During multidisciplinary team rounds Anyi Yeager is a 40 y.o. female was seen, examined and discussed. This is confirmation that I have personally seen and examined the patient and that the key elements of the encounter were performed by me (> 85 % time). The medications & laboratory data was discussed and adjusted where necessary. The radiographic images were reviewed or with radiologist or consultant if felt dis-concordant with the exam or history. The above findings were corroborated, plans confirmed and changes made if needed. Family is updated at the bedside as available. Key issues of the case were discussed among consultants.   Critical Care time is documented if appropriate.       Gigi Webber, DO, FACP, FCCP, Nena Alvarado,

## 2022-05-15 NOTE — PROGRESS NOTES
Luisa Macias is a 40 y.o. right handed female     Patient was brought to the emergency department on 5/12/2022 late evening. Reportedly had cardiac arrested 30 minutes prior to arrival.    Upon EMS arrival patient was reportedly in asystole and was in asystole for approximately 15 minutes before ROSC was achieved. However patient then again lost pulses with EMS and CPR continued in the emergency department. She did receive Narcan in the field for she has a history of polysubstance abuse and urine drug screen was positive for fentanyl and cocaine as well as cannabinoids on arrival to ED.     CT of her head was obtained which demonstrated marked cerebral edema    Sedation was stopped yesterday morning    No witnessed seizure activity has been reported however she remains on Keppra 500 mg twice a day presumably prophylactically  Discussed with medical staff at bedside    Allergies as of 05/12/2022    (No Known Allergies)     Objective:     /72   Pulse 78   Temp 96.3 °F (35.7 °C) (Esophageal)   Resp 20   Ht 5' 9\" (1.753 m)   Wt 167 lb (75.8 kg)   SpO2 99%   BMI 24.66 kg/m²      General appearance: Not awake  Head: Normocephalic, without obvious abnormality, atraumatic  Extremities: no cyanosis or edema  Pulses: 2+ and symmetric  Skin: no rashes or lesions    Mental Status: Not awake-not following commands    Cranial Nerves:  I: smell    II: visual acuity     II: visual fields  no threat   II: pupils  pupils not reactive but appears symmetrical   III,VII: ptosis    III,IV,VI: extraocular muscles   doll's are not present   V: mastication    V: facial light touch sensation     V,VII: corneal reflex   not present   VII: facial muscle function - upper     VII: facial muscle function - lower Normal   VIII: hearing    IX: soft palate elevation     IX,X: gag reflex  not present   XI: trapezius strength     XI: sternocleidomastoid strength    XI: neck extension strength     XII: tongue strength       No spontaneous movement    No grimacing or withdrawal to noxious stimulation    DTR:   No reflexes    No Babinski  No Tafoya's     Laboratory/Radiology:     CBC with Differential:    Lab Results   Component Value Date    WBC 21.0 05/15/2022    RBC 3.58 05/15/2022    HGB 10.8 05/15/2022    HCT 34.5 05/15/2022     05/15/2022    MCV 96.4 05/15/2022    MCH 30.2 05/15/2022    MCHC 31.3 05/15/2022    RDW 15.4 05/15/2022    METASPCT 0.9 05/12/2022    LYMPHOPCT 6.9 05/15/2022    MONOPCT 5.9 05/15/2022    BASOPCT 0.1 05/15/2022    MONOSABS 1.24 05/15/2022    LYMPHSABS 1.44 05/15/2022    EOSABS 0.01 05/15/2022    BASOSABS 0.02 05/15/2022     CMP:    Lab Results   Component Value Date     05/15/2022    K 3.8 05/15/2022     05/15/2022    CO2 23 05/15/2022    BUN 20 05/15/2022    CREATININE 1.2 05/15/2022    GFRAA >60 05/15/2022    LABGLOM 51 05/15/2022    GLUCOSE 157 05/15/2022    PROT 4.9 05/12/2022    LABALBU 3.2 05/12/2022    CALCIUM 9.0 05/15/2022    BILITOT 0.3 05/12/2022    ALKPHOS 69 05/12/2022     05/12/2022     05/12/2022       UDS:  Positive for cannabinoids, cocaine and fentanyl    CT Head:  Diffuse cerebral and cerebellar edema highly concerning for hypoxic-ischemic  encephalopathy, particularly given patient's clinical history of cardiac  arrest with prolonged resuscitation. I personally reviewed the patient's lab and imaging studies at this time. Assessment:     Patient with marked cerebral edema most likely related to anoxic encephalopathy status postcardiac arrest for significant period of time.   She reportedly arrested 30 minutes prior to arrival and only had pulses for approximately 10 minutes per EMS/ED report until ROSC was achieved in the emergency department    History of drug abuse with urine drug screen testing positive for cocaine, cannabinoids as well as fentanyl    On neuro exam at this time I find no cranial nerve or cerebral brain function now being off

## 2022-05-15 NOTE — PROGRESS NOTES
[facility]  Internal Medicine Residency / [address]    Attending Physician Statement  I have discussed the case, including pertinent history and exam findings with the resident and the team.  I have seen and examined the patient, reviewed meds and pertinent labs and the key elements of the encounter have been performed by me. I agree with the assessment, plan and orders as documented by the resident. No significant change in status, has dips in BP < 90 systolic, WBC 24,709 Hb 22.8, Na+ 151, and creatinine 1.2. initial blood culture one of two bottles GPC, await identification and repeat blood cultures pending. Neurology and palliative care input appreciated. Remainder of medical problems as per resident note.       Franchesca Montemayor,   Internal Medicine Residency Faculty

## 2022-05-15 NOTE — PROGRESS NOTES
[facility]  Internal Medicine Residency Program  Progress Note - House Team     Patient:  Nancy Burrell 40 y.o. female MRN: [id number]     Date of Service: 5/15/2022     CC: Cardiac arrest       Subjective     Patient was seen and examined this morning at bedside and she remains in critical condition. Overnight:   Urine output very high with clear urine (1L in 2h), Sodium rising, given DDAVP 1 mcg  MRSA nares positive, started bactroban  Gastroccult positive, having dark fluid suctioned through NG, Hgb 10.9  Borderline BP, MAP 64-65, given 250 cc LR bolus  PCO2 was 43, increased RR to 20, repeat PCO2 35-36  Phos 1.4, replaced, r  K 3.8   She is not responsive to painful stimuli, reflexes are still absent. Urine Cx growing E coli. Objective     Physical Exam:  · Vitals: BP 95/66   Pulse 77   Temp 96.3 °F (35.7 °C) (Esophageal)   Resp 20   Ht 5' 9\" (1.753 m)   Wt 167 lb (75.8 kg)   SpO2 98%   BMI 24.66 kg/m²     I & O - 24hr: I/O this shift: In: 529.4 [I.V.:84.8; IV Piggyback:444.6]  · Out: 345 [Urine:195; Emesis/NG output:150]   · General Appearance: Intubated, on the vent, unresponsive  · HEENT:  Head: Normal, normocephalic, atraumatic. · Neck: no adenopathy, no carotid bruit, no JVD, supple, symmetrical, trachea midline and thyroid not enlarged, symmetric, no tenderness/mass/nodules  · Lung: clear to auscultation bilaterally  · Heart: regular rate and rhythm, S1, S2 normal, no murmur, click, rub or gallop  · Abdomen: soft, non-tender; bowel sounds normal; no masses,  no organomegaly  · Extremities:  extremities normal, atraumatic, no cyanosis or edema  · Musculokeletal: No joint swelling, no muscle tenderness. ROM normal in all joints of extremities.    · Neurologic: Mental status: Intubated on the vent, nonresponsive to pain, pupil not reactive to light, no gag or cough reflexes, no corneal reflexes  Subject  Pertinent Labs & Imaging Studies   isaias  CBC:   Lab Results Component Value Date    WBC 21.0 05/15/2022    RBC 3.58 05/15/2022    HGB 10.8 05/15/2022    HCT 34.5 05/15/2022    MCV 96.4 05/15/2022    MCH 30.2 05/15/2022    MCHC 31.3 05/15/2022    RDW 15.4 05/15/2022     05/15/2022    MPV 11.4 05/15/2022     CBC with Differential:    Lab Results   Component Value Date    WBC 21.0 05/15/2022    RBC 3.58 05/15/2022    HGB 10.8 05/15/2022    HCT 34.5 05/15/2022     05/15/2022    MCV 96.4 05/15/2022    MCH 30.2 05/15/2022    MCHC 31.3 05/15/2022    RDW 15.4 05/15/2022    METASPCT 0.9 05/12/2022    LYMPHOPCT 6.9 05/15/2022    MONOPCT 5.9 05/15/2022    BASOPCT 0.1 05/15/2022    MONOSABS 1.24 05/15/2022    LYMPHSABS 1.44 05/15/2022    EOSABS 0.01 05/15/2022    BASOSABS 0.02 05/15/2022     WBC:    Lab Results   Component Value Date    WBC 21.0 05/15/2022     Platelets:    Lab Results   Component Value Date     05/15/2022     Hemoglobin/Hematocrit:    Lab Results   Component Value Date    HGB 10.8 05/15/2022    HCT 34.5 05/15/2022     CMP:    Lab Results   Component Value Date     05/15/2022    K 3.8 05/15/2022     05/15/2022    CO2 23 05/15/2022    BUN 20 05/15/2022    CREATININE 1.2 05/15/2022    GFRAA >60 05/15/2022    LABGLOM 51 05/15/2022    GLUCOSE 157 05/15/2022    PROT 4.9 05/12/2022    LABALBU 3.2 05/12/2022    CALCIUM 9.0 05/15/2022    BILITOT 0.3 05/12/2022    ALKPHOS 69 05/12/2022     05/12/2022     05/12/2022     BMP:    Lab Results   Component Value Date     05/15/2022    K 3.8 05/15/2022     05/15/2022    CO2 23 05/15/2022    BUN 20 05/15/2022    LABALBU 3.2 05/12/2022    CREATININE 1.2 05/15/2022    CALCIUM 9.0 05/15/2022    GFRAA >60 05/15/2022    LABGLOM 51 05/15/2022    GLUCOSE 157 05/15/2022     Sodium:    Lab Results   Component Value Date     05/15/2022     Potassium:    Lab Results   Component Value Date    K 3.8 05/15/2022     BUN/Creatinine:    Lab Results   Component Value Date    BUN 20 05/15/2022    CREATININE 1.2 05/15/2022     Hepatic Function Panel:    Lab Results   Component Value Date    ALKPHOS 69 05/12/2022     05/12/2022     05/12/2022    PROT 4.9 05/12/2022    BILITOT 0.3 05/12/2022    LABALBU 3.2 05/12/2022     Albumin:    Lab Results   Component Value Date    LABALBU 3.2 05/12/2022     Calcium:    Lab Results   Component Value Date    CALCIUM 9.0 05/15/2022     Ionized Calcium:  No results found for: IONCA  Magnesium:    Lab Results   Component Value Date    MG 2.5 05/15/2022     Phosphorus:    Lab Results   Component Value Date    PHOS 1.4 05/15/2022     LDH:  No results found for: LDH  Uric Acid:  No results found for: LABURIC, URICACID  PT/INR:    Lab Results   Component Value Date    PROTIME 11.6 05/13/2022    INR 1.1 05/13/2022     Warfarin PT/INR:  No components found for: PTPATWAR, PTINRWAR  PTT:  No results found for: APTT, PTT[APTT}  Troponin:  No results found for: TROPONINI  Last 3 Troponin:  No results found for: TROPONINI  U/A:    Lab Results   Component Value Date    COLORU Yellow 05/12/2022    PROTEINU >=300 05/12/2022    PHUR 7.0 05/12/2022    WBCUA 1-3 05/12/2022    RBCUA 10-20 05/12/2022    MUCUS Present 06/17/2019    BACTERIA MANY 05/12/2022    CLARITYU SL CLOUDY 05/12/2022    SPECGRAV 1.020 05/12/2022    LEUKOCYTESUR Negative 05/12/2022    UROBILINOGEN 1.0 05/12/2022    BILIRUBINUR Negative 05/12/2022    BLOODU MODERATE 05/12/2022    GLUCOSEU 250 05/12/2022       XR CHEST PORTABLE   Final Result   Satisfactory line placements. No pneumothorax and no acute disease. CT HEAD WO CONTRAST   Final Result   Diffuse cerebral and cerebellar edema highly concerning for hypoxic-ischemic   encephalopathy, particularly given patient's clinical history of cardiac   arrest with prolonged resuscitation. Critical results were called by Dr. Yared Altman to [place] on   5/12/2022 at 21:46.          CTA CHEST W CONTRAST   Final Result   No evidence of pulmonary embolism or acute pulmonary abnormality. No evidence of aortic dissection or aneurysm. Endotracheal tube tip is in the right mainstem bronchus and should be   retracted 5 cm         XR CHEST PORTABLE   Final Result   1. Endotracheal tube extends into the right mainstem bronchus. Recommend   retraction as above. 2.  Satisfactory position of the enteric tube. 3.  Faint interstitial opacities identified bilaterally. Prominence of the   cardiac silhouette. XR ABDOMEN FOR NG/OG/NE TUBE PLACEMENT   Final Result   1. Endotracheal tube extends into the right mainstem bronchus. Recommend   retraction as above. 2.  Satisfactory position of the enteric tube. 3.  Faint interstitial opacities identified bilaterally. Prominence of the   cardiac silhouette.          MRI BRAIN WO CONTRAST    (Results Pending)        Resident's Assessment and Plan     Assessment and Plan:    Cardiac arrest likely secondary to respiratory arrest after drug overdose  Acute metabolic encephalopathy likely secondary to #1,   Possible GI bleeding- positive Gastroccult,   Leukocytosis secondary to infection  HAGMA secondary to lactic acidosis, resolved  KYA secondary to hypoperfusion  History of polysubstance abuse    Plan  Neurology on board appreciate the recommendations  On normal saline 3%, maintain sodium less than 155  Check sodium every 2 hours  Follow-up brain MRI and EEG  Check H&H Q8 hr   Keep PCO2 between 30 and 35  Repeat blood cultures  Palliative care consulted, appreciate the inputs  Continue vancomycin and Zosyn    PT/OT evaluation: none  DVT prophylaxis/ GI prophylaxis: PCD/PPI  Disposition: continue current care     Ginny Rose MD, PGY-1  Attending physician: Dr. Brian Morgan

## 2022-05-15 NOTE — PLAN OF CARE
Notified by nurse for tachycardia (heart rate 120s) and hypertensive (-180's) with increased urine output 1 hour after returning from MRI. Nurses had given hydralazine 10 mg without any response. Patient's neuro exam remains the same, absent reflexes. Patient's MRI with very poor prognosis with diffuse anoxic brain injury with extensive cerebral edema and cerebellar tonsillar herniation into the upper cervical canal.  EEG pending. Patient is afebrile, no seizure-like activity, breathing with the ventilator. EKG performed showing sinus tachycardia. Patient's abnormal vitals likely related to cushing's triad due to [address] with hypertension (tachycardia likely related to hypertension, breathing with the vent). I called and discussed with Edwina [facility] with an update regarding the poor prognosis and imaging results. Edwina understands the patient's prognosis and wishes to change the CODE STATUS to DNR CCA and is leaning towards withdrawal of care, but will call the children first to see if they want to be present. All questions answered. I instructed the nurse to continue conservative measures for ICH including head elevation, reducing excessive flexion or rotation of the neck, avoiding restrictive neck taping, and minimizing stimuli that could induce Valsalva responses (such as endotracheal suctioning). After reassessment, patients vitals improving slightly with HR in the 110's and 's. ABG from this morning with pCO2 36 and mild alkalosis, RR increased this morning and will obtain repeat ABG and may benefit from hyperventilation (pCO2 goal 26-30). Serum osmolality today 317 mOsm/Kg, one dose of mannitol will be given at 1 g/kg, will repeat serum osmolality afterwards with goal <320 mOsm/Kg.        Electronically signed by Rosaura Ruiz MD on 5/15/2022 at 4:09 PM

## 2022-05-16 NOTE — PROGRESS NOTES
[facility]  Internal Medicine Residency Program  Progress Note - House Team     Patient:  Zelalem Maya 40 y.o. female MRN: [id number]     Date of Service: 5/16/2022     CC: Cardiac arrest    Days since admission: 4    Subjective     Overnight events: MRI showed diffuse anoxic brain injury with severe brain edema and cerebellar tonsillar herniation, concern for Cushing's triad overnight. Hydralazine 10 mg given, head elevated, mannitol 1 g/kg with goal osmolality less than 320 initiated. Patient was on 3% saline and sodium increased to 160 and then 162. D5 water started at 200 cc/h. And nephrology consulted for central DI. Spoke with family member Shannan Fang yesterday who changed CODE STATUS to DNR CCA and contacting children for further goals of care    No reflexes this a.m., no meaningful neurologic activity. Objective     Physical Exam:  · Vitals: BP (!) 86/54   Pulse 89   Temp 98.8 °F (37.1 °C) (Esophageal)   Resp 20   Ht 5' 9\" (1.753 m)   Wt 167 lb (75.8 kg)   SpO2 100%   BMI 24.66 kg/m²     I & O - 24hr:   Intake/Output Summary (Last 24 hours) at 5/16/2022 1335  Last data filed at 5/16/2022 1200  Gross per 24 hour   Intake 2353.08 ml   Output 4080 ml   Net -1726.92 ml   ·    · General Appearance: Lying in bed, eyes closed  · HEENT:  Head: Normal, normocephalic, atraumatic. · Neck: no adenopathy, no carotid bruit, no JVD, supple, symmetrical, trachea midline and thyroid not enlarged, symmetric, no tenderness/mass/nodules  · Lung: clear to auscultation bilaterally  · Heart: regular rate and rhythm, S1, S2 normal, no murmur, click, rub or gallop  · Abdomen: soft, non-tender; bowel sounds normal; no masses,  no organomegaly  · Extremities:  extremities normal, atraumatic, no cyanosis or edema  · Musculokeletal: No joint swelling, no muscle tenderness. ROM normal in all joints of extremities. · Neurologic: Corneal, pupillary, gag reflex absent.   Does not withdraw to pain in all extremities. Subject  Pertinent Information & Imaging Studies, Consults   isaias  CBC with Differential:    Lab Results   Component Value Date    WBC 17.3 05/16/2022    RBC 3.47 05/16/2022    HGB 10.5 05/16/2022    HCT 33.8 05/16/2022     05/16/2022    MCV 97.4 05/16/2022    MCH 30.3 05/16/2022    MCHC 31.1 05/16/2022    RDW 15.7 05/16/2022    METASPCT 0.9 05/12/2022    LYMPHOPCT 8.7 05/16/2022    MONOPCT 6.9 05/16/2022    BASOPCT 0.1 05/16/2022    MONOSABS 1.20 05/16/2022    LYMPHSABS 1.50 05/16/2022    EOSABS 0.01 05/16/2022    BASOSABS 0.02 05/16/2022     CMP:    Lab Results   Component Value Date     05/16/2022    K 3.5 05/16/2022     05/16/2022    CO2 28 05/16/2022    BUN 11 05/16/2022    CREATININE 1.2 05/16/2022    GFRAA >60 05/16/2022    LABGLOM 51 05/16/2022    GLUCOSE 144 05/16/2022    PROT 4.9 05/12/2022    LABALBU 3.2 05/12/2022    CALCIUM 9.0 05/16/2022    BILITOT 0.3 05/12/2022    ALKPHOS 69 05/12/2022     05/12/2022     05/12/2022       IMAGING:   Imaging Studies:    CT HEAD WO CONTRAST    Result Date: 5/12/2022  Diffuse cerebral and cerebellar edema highly concerning for hypoxic-ischemic encephalopathy, particularly given patient's clinical history of cardiac arrest with prolonged resuscitation. Critical results were called by Dr. Florence Blair to [place] on 5/12/2022 at 21:46. XR CHEST PORTABLE    Result Date: 5/13/2022  Satisfactory line placements. No pneumothorax and no acute disease. XR CHEST PORTABLE    Result Date: 5/12/2022  1. Endotracheal tube extends into the right mainstem bronchus. Recommend retraction as above. 2.  Satisfactory position of the enteric tube. 3.  Faint interstitial opacities identified bilaterally. Prominence of the cardiac silhouette. CTA CHEST W CONTRAST    Result Date: 5/12/2022  No evidence of pulmonary embolism or acute pulmonary abnormality. No evidence of aortic dissection or aneurysm.  Endotracheal tube tip is in the right mainstem bronchus and should be retracted 5 cm     XR ABDOMEN FOR NG/OG/NE TUBE PLACEMENT    Result Date: 5/12/2022  1. Endotracheal tube extends into the right mainstem bronchus. Recommend retraction as above. 2.  Satisfactory position of the enteric tube. 3.  Faint interstitial opacities identified bilaterally. Prominence of the cardiac silhouette. MRI BRAIN WO CONTRAST    Result Date: 5/15/2022  1. Diffuse areas of restricted diffusion involving the supratentorial and infratentorial compartments with diffuse sulcal effacement as well as effacement of the basilar cisterns. This is compatible with diffuse anoxic brain injury with extensive cerebral edema. 2. There is cerebellar tonsillar herniation into the upper cervical canal. These results were sent to the [facility] ([address] [place] [address]) on 5/15/2022 at 2:27 pm to be communicated to the referring/covering health care provider/office. PROCEDURES: -    FLUIDS: D5W at 200 cc/h      Notable Cultures:      Blood cultures   Blood Culture, Routine   Date Value Ref Range Status   05/14/2022 24 Hours no growth  Preliminary     Respiratory cultures No results found for: RESPCULTURE No results found for: LABGRAM  Urine   Urine Culture, Routine   Date Value Ref Range Status   05/13/2022 >100,000 CFU/ml  Final     Legionella No results found for: LABLEGI  C Diff PCR No results found for: CDIFPCR  Wound culture/abscess: No results for input(s): WNDABS in the last 72 hours. Tip culture:No results for input(s): CXCATHTIP in the last 72 hours. Antibiotic  Days  Day started                                  OXYGENATION: Intubated    DIET: N.p.o.        Resident's Assessment and Plan     Stuart Lucero is a 40 y.o. female with  has no past medical history on file. came here with CC   Chief Complaint   Patient presents with    Cardiac Arrest     Cardiac arrest, Hx of opiate OD. EMS arrived around 2135 and pt unresponsive, in asystole.  Epi admin x3 and ROSC achieved after 15 min. Pt went into [facility] shorlty PTA and received another dose of epi. 2mg narcan. R humeral IO. Days since admission: 4    Consults:   [facility] Nephrology   Palliative care   Critical care   Neurology    Assessment:  1. Cardiac arrest likely 2/2 respiratory arrest after drug overdose - unknown downtime, CPR around 20 to 30 minutes total, unclear how many rounds of epi, UDS positive for cocaine/fentanyl/cannabis  2. Acute metabolic encephalopathy likely 2/2 #1 - CT head on admission showing diffuse brain edema concerning for anoxic brain injury, MRI brain on 5/16 showed diffuse anoxic brain injury with extensive cerebral edema, cerebellar tonsillar herniation into upper cervical canal, reflexes absent on exam today, neurology following  3. Cerebellar tonsillar herniation- received mannitol, 3% saline, no ICP in place  4. Hypernatremia 2/2 3% saline and central diabetes insipidus-urine output 4 L in past 24 hours, on D5 water and DDAVP now from nephrology  5. Leukocytosis possibly reactive, rule out infection  6. HAGMA 2/2 lactic acidosis  7. History of polysubstance abuse     Plan:   Absence of reflexes, MRI findings concerning for diffuse anoxic brain injury, cerebral edema, cerebellar tonsillar herniation indicating poor prognosis   Neurology recommends brain death criteria   Monitor BMP and sodium every 4 hourly   Palliative care spoken to family regarding current goals of care. They know about brain death criteria and possible terminal extubation. They would like to talk to patient's children before any decisions. Currently patient is full code. PT/OT: -  DVT ppx: -  GI ppx: Protonix      Next of Kin/ POA:  Joslyn Weston Brother/Sister       Address:   Work phone:      Mobile phone: [phone]         Code Status:   Full    Disposition: No meaningful neurologic response, brain death criteria recommended by neurology, family discussing goals of care with palliative care, overall prognosis poor      Rasta Mejia MD, PGY-2  Attending physician: Dr. Saranya Brito       NOTE: This report was transcribed using voice recognition software. Every effort was made to ensure accuracy; however, inadvertent computerized transcription errors may be present.

## 2022-05-16 NOTE — DEATH NOTES
ADULT BRAIN DEATH PRONOUNCEMENT NOTE    Patient's Name: Ghulam Hazel   Patient's YOB: 1985  MRN Number: [id number]    Admitting Provider: Giselle Conrad DO  Attending Provider: Giselle Conrad DO     Number Examinations:  o One examination by a physician, no specified period of time after neurological insult   Prerequisites Guidelines  o Irreversible and identifiable cause of coma - including but not limited to, Traumatic Brain Injury and Anoxic Brain Injury  o Blood Alcohol Level (BAL) less than 0.08%  o Electrolyte levels or acid-base balance normal, intentionally abnormal, or irreversible abnormal because of known disease process   o Sedatives, Analgesics, and Other Central Nervous System Depressant Drugs - absent or 5 times the drugs half-life from last use (assuming normal hepatic and renal function, use of hypothermia may delay drug metabolism)  o Neuromuscular blockade (paralytics) - not used or off for sufficient period of time to be out of system and excluded as contributing factor. If neuromuscular blockade discontinued, test maximal ulnar nerve stimulation and need to have train of four out of four twitches before proceeding with exam.  o Metabolic intoxication - excluded as contributing factor based on history or laboratory examinations  o No spontaneous respiratory effort while on ventilator  o Core body temperature is 36oC or greater   o Systolic Blood Pressure (SBP) equal or greater than 100 mm Hg. If unable to meet SBP parameters with vasopressors, will need to perform ancillary tests after performing Neurological Exam and Apnea Exam, which indicates brain death.    Neurological Exam - all brain stem reflexes MUST be ABSENT to meet criteria for brain stem death   Apnea Exam -   o Need to perform only one Apnea exam  o Pre-ABG PaCO2 should be eucapnic (35 to 45 mmHg) or at patient's baseline PaCO2  o Must have no respiratory effort observed during apnea exam  o Post-ABG demonstrates a PaCO2 rises by at least 20 mm Hg above baseline OR is greater than 60 mm Hg (if patient does not have history of CO2 retention)  o Terminate Apnea Exam if meets positive criteria or the patient becomes hypoxic (SpO2 less than 85%) or unstable (SBP less than 90 mm Hg)  o If patient does not tolerate Apnea Exam then will need to proceed with Ancillary Test   Ancillary Tests - NOT REQUIRED UNLESS ONE OR MORE OF THE FOLLOWING CRITERIA PRESENT:   o Neurologic and/or Apnea Examinations attempted but patient could not tolerate examination(s)  o Any uncertainty about the Neurologic and/or Apnea Examinations  o Concern regarding potential confounding factors (such as pre-existing neuromuscular disease or diaphragm paralysis)     Results of Neurological Exam   Date/Time of Exam Date: 5/16/2022  Time: 1424 hours   Prerequisites:    Cause of Coma Anoxic Brain Injury   BAL <10   Sedatives, Analgesics, CNS Depressants - Has been off all CNS depressant medications for at least 5 half lives   Neuromuscular blockade Absent    Metabolic intoxication Absent    Spontaneous respiratory effort while on ventilator Absent    Core body temperature 37.1      Neurological Exam:    Response to pain stimuli sternal rub or supraorbital pressure Absent    Yawning Absent    Posturing Absent    Pupillary reaction with no local eye trauma or mydriatics Absent    Corneal reflex Absent    Gag reflex Absent    Cough reflex (when suctioning trach tube) Absent    Oculovestibular reflex Absent    Oculocephalic reflex (Doll's eyes) Absent     Provider completing Exam  Electronically signed by Mikayla Hurt MD on 5/16/22 at 2:26 PM EDT     Apnea Test Date: 5/16/2022  Time: 1545  Pre PaCO2: 39 mmHg  Pre pH: 7.449  Post PaCO2: 70 mmHg  Post pH: 7.22  Results: apneic    Provider completing Apnea Exam: Electronically signed by Mikayla Hurt MD on 5/16/22 at 4:02 PM EDT     Ancillary Tests:   Not Indicated    Signature and Pronouncement of Brain Death   I certify that Brain Death Neurological Exam and Apnea Test confirms irreversible cessation of function of the brain and brainstem.   The patient is declared brain dead on 5/16/22  at 16:00PM.     Electronically signed by Radha Dozier MD on 5/16/22 at 4:04 PM EDT  Examiner's Specialty: Critical Care

## 2022-05-16 NOTE — PROGRESS NOTES
[address] [facility]  Department of Internal Medicine   Internal Medicine Residency   MICU Progress Note    Patient:  Shilpa Thibodeaux 40 y.o. female  MRN: [id number]     Date of Service: 5/16/2022    Allergy: Patient has no known allergies. Subjective     24 hour change:   · Developing central diabetes insipidus. Nephrology consulted overnight. · Received mannitol and DDAVP  · Patient becoming hypernatremic as high as Na 169, started on D5W 225 cc/h by nephrology  · Family discussed possible withdrawal of care however wants to wait for the patient's children to see her prior to making a decision    Pt seen and examined this AM. All sedation off. Cough/gag/ corneal reflex not present. Objective     VS: /61   Pulse 87   Temp 98.6 °F (37 °C) (Esophageal)   Resp 20   Ht 5' 9\" (1.753 m)   Wt 167 lb (75.8 kg)   SpO2 100%   BMI 24.66 kg/m²           I & O - 24hr:     Intake/Output Summary (Last 24 hours) at 5/16/2022 0850  Last data filed at 5/16/2022 0600  Gross per 24 hour   Intake 2353.08 ml   Output 4060 ml   Net -1706.92 ml       Physical Exam:  · General Appearance: Intubated, off all sedation, unresponsive  · Neck: no adenopathy, no carotid bruit, no JVD, supple, symmetrical, trachea midline and thyroid not enlarged, symmetric, no tenderness/mass/nodules  · Lung: clear to auscultation bilaterally  · Heart: regular rate and rhythm, S1, S2 normal, no murmur, click, rub or gallop  · Abdomen: soft, non-tender; bowel sounds normal; no masses,  no organomegaly  · Extremities:  extremities normal, atraumatic, no cyanosis or edema  · Musculoskeletal: No joint swelling, no muscle tenderness. ROM normal in all joints of extremities.    · Neurologic: Mental status: off sedation, pupils nonreactive to light, no cough/gag, no response to painful stimuli    Lines     site day    Art line   None    TLC R SC 3   PICC None    Hemoaccess None      Mechanical Ventilation:   Mode: A/C    TV:350 ml RR: 20  PEEP 8cmH2O FiO2 40    ABG:     Lab Results   Component Value Date    PH 7.449 05/16/2022    PCO2 39.0 05/16/2022    PO2 73.7 05/16/2022    HCO3 26.4 05/16/2022    BE 2.4 05/16/2022    THB 11.3 05/16/2022    O2SAT 95.4 05/16/2022        Medications     Infusions: (Fluid, Sedation, Vasopressors)  IVF:    None   Vasopressors   None  Sedation   None    Nutrition:   NPO    ATB:   Antibiotics  Days   Vancomycin 3   Zosyn 3           Labs     CBC with Differential:    Lab Results   Component Value Date    WBC 17.3 05/16/2022    RBC 3.47 05/16/2022    HGB 10.5 05/16/2022    HCT 33.8 05/16/2022     05/16/2022    MCV 97.4 05/16/2022    MCH 30.3 05/16/2022    MCHC 31.1 05/16/2022    RDW 15.7 05/16/2022    METASPCT 0.9 05/12/2022    LYMPHOPCT 8.7 05/16/2022    MONOPCT 6.9 05/16/2022    BASOPCT 0.1 05/16/2022    MONOSABS 1.20 05/16/2022    LYMPHSABS 1.50 05/16/2022    EOSABS 0.01 05/16/2022    BASOSABS 0.02 05/16/2022     CMP:    Lab Results   Component Value Date     05/16/2022    K 3.2 05/16/2022     05/16/2022    CO2 29 05/16/2022    BUN 12 05/16/2022    CREATININE 1.3 05/16/2022    GFRAA 56 05/16/2022    LABGLOM 46 05/16/2022    GLUCOSE 177 05/16/2022    PROT 4.9 05/12/2022    LABALBU 3.2 05/12/2022    CALCIUM 9.3 05/16/2022    BILITOT 0.3 05/12/2022    ALKPHOS 69 05/12/2022     05/12/2022     05/12/2022       Resident's Assessment and Plan     Dinorah Hinojosa is a 40 y.o. female with PMHx of polysubstance abuse (cannabis, opioids, fentanyl) who presented after having a cardiac arrest.     Assessment:  1. Cardiac arrest likely 2/2 respiratory arrest after drug overdose - unknown downtime, CPR around 20 to 30 minutes total, unclear how many rounds of epi, UDS positive for cocaine/fentanyl/cannabis  2.  Acute metabolic encephalopathy likely 2/2 #1 - CT head on admission showing diffuse brain edema concerning for anoxic brain injury; MRI brain showed anoxic brain injury with cerebellar tonsillar herniation  3. Central diabetes insipidus with hypernatremia  4. Leukocytosis 2/2 staph pneumonia and E. coli UTI  5. HAGMA 2/2 lactic acidosis, resolved  6. History of polysubstance abuse     Plan:  · Poor prognosis. Will perform brain death protocol today. · Continue D5W 200 cc/hr and DDAVP 1 mcg TID per Nephrology  · Continue Zosyn  · Keep pCO2 30-35; at goal  · Neurology, Nephrology and Palliative care following, patient recommendations  · Contacted POLINDA Bland. She and her family (including patient's children) will have a family meeting tonight to decide on withdrawal of care. She said she will call the MICU once that decision is made. · NOK is Crystal- pt's biological cousin. Pt's children are 12 y/o and 11 y/o. Pt is not . DVT prophylaxis: PCD  GI prophylaxis: PPI  Disposition: poor prognosis. Cont. MICU mngt. Ave Beckwith MD ASTRID PGY-2  Attending physician: Dr. Khang Oglesby    I personally saw, examined and provided care for the patient. Radiographs, labs and medication list were reviewed by me independently. I spoke with bedside nursing, therapists and consultants. Critical care services and times documented are independent of procedures and multidisciplinary rounds with Residents. Additionally comprehensive, multidisciplinary rounds were conducted with the MICU team. The case was discussed in detail and plans for care were established. Review of Residents documentation was conducted and revisions were made as appropriate. I agree with the above documented exam, problem list and plan of care with the following additions:    Brain death exam and apnea test completed today  Declared brain dead 16:00PM.  [facility] aware  Will assist with workup for organ donation    40 minutes of CCT spent with the patient, reviewing the chart including imaging studies, and discussing the case with other health care professionals. This time excludes procedures.      Mahad Lamas MD

## 2022-05-16 NOTE — PROGRESS NOTES
[facility]  Internal Medicine Residency / [address]    Attending Physician Statement  I have discussed the case, including pertinent history and exam findings with the resident and the team.  I have seen and examined the patient, reviewed meds and pertinent labs and the key elements of the encounter have been performed by me. I agree with the assessment, plan and orders as documented by the resident. Neurology input and recommendations noted and appreciated. MRI report reviewed with med team on rounds. Patient with significant hypernatremia, likely central DI, Nephrology input and management appreciated. Remainder of medical problems as per resident note.       Aditi Jaimes, DO  Internal Medicine Residency Faculty

## 2022-05-16 NOTE — PROGRESS NOTES
Witnessed consent provided via telephone by Wood He (Aunt) and biological mother Geraldyne Boxer for organ donation with [facility]). Tangela December stating she has relinquished decision making rights to Israel Angel. Charge nurse and providers aware.

## 2022-05-16 NOTE — PROGRESS NOTES
Ciara Rod is a 40 y.o. right handed female     Patient was brought to the emergency department on 5/12/2022 late evening. Reportedly had cardiac arrested 30 minutes prior to arrival.    Upon EMS arrival patient was reportedly in asystole and was in asystole for approximately 15 minutes before ROSC was achieved. However patient then again lost pulses with EMS and CPR continued in the emergency department. She did receive Narcan in the field for she has a history of polysubstance abuse and urine drug screen was positive for fentanyl and cocaine as well as cannabinoids on arrival to ED.     CT of her head was obtained which demonstrated marked cerebral edema  MRI obtained yesterday consistent with extensive cerebral edema     Sedation was stopped yesterday morning    No witnessed seizure activity has been reported however she remains on Keppra 500 mg twice a day presumably prophylactically    Allergies as of 05/12/2022    (No Known Allergies)     Objective:     /63   Pulse 97   Temp 98.6 °F (37 °C) (Esophageal)   Resp 20   Ht 5' 9\" (1.753 m)   Wt 167 lb (75.8 kg)   SpO2 99%   BMI 24.66 kg/m²      General appearance: Not awake  Head: Normocephalic, without obvious abnormality, atraumatic  Extremities: no cyanosis or edema  Pulses: 2+ and symmetric  Skin: no rashes or lesions    Mental Status: Not awake-not following commands    Cranial Nerves:  I: smell    II: visual acuity     II: visual fields  no threat   II: pupils  pupils not reactive but appears symmetrical   III,VII: ptosis    III,IV,VI: extraocular muscles   doll's are not present   V: mastication    V: facial light touch sensation     V,VII: corneal reflex   not present   VII: facial muscle function - upper     VII: facial muscle function - lower Normal   VIII: hearing    IX: soft palate elevation     IX,X: gag reflex  not present   XI: trapezius strength     XI: sternocleidomastoid strength    XI: neck extension strength     XII: tongue strength       No spontaneous movement  No grimacing or withdrawal to noxious stimulation    DTR:   No reflexes    No Babinski  No Tafoya's     Laboratory/Radiology:     CBC with Differential:    Lab Results   Component Value Date    WBC 17.3 05/16/2022    RBC 3.47 05/16/2022    HGB 10.5 05/16/2022    HCT 33.8 05/16/2022     05/16/2022    MCV 97.4 05/16/2022    MCH 30.3 05/16/2022    MCHC 31.1 05/16/2022    RDW 15.7 05/16/2022    METASPCT 0.9 05/12/2022    LYMPHOPCT 8.7 05/16/2022    MONOPCT 6.9 05/16/2022    BASOPCT 0.1 05/16/2022    MONOSABS 1.20 05/16/2022    LYMPHSABS 1.50 05/16/2022    EOSABS 0.01 05/16/2022    BASOSABS 0.02 05/16/2022     CMP:    Lab Results   Component Value Date     05/16/2022    K 3.2 05/16/2022     05/16/2022    CO2 29 05/16/2022    BUN 12 05/16/2022    CREATININE 1.3 05/16/2022    GFRAA 56 05/16/2022    LABGLOM 46 05/16/2022    GLUCOSE 177 05/16/2022    PROT 4.9 05/12/2022    LABALBU 3.2 05/12/2022    CALCIUM 9.3 05/16/2022    BILITOT 0.3 05/12/2022    ALKPHOS 69 05/12/2022     05/12/2022     05/12/2022       UDS:  Positive for cannabinoids, cocaine and fentanyl    CT Head:  Diffuse cerebral and cerebellar edema highly concerning for hypoxic-ischemic  encephalopathy, particularly given patient's clinical history of cardiac  arrest with prolonged resuscitation. MRI Brain:  1. Diffuse areas of restricted diffusion involving the supratentorial and  infratentorial compartments with diffuse sulcal effacement as well as  effacement of the basilar cisterns.  This is compatible with diffuse anoxic  brain injury with extensive cerebral edema. I personally reviewed the patient's lab and imaging studies at this time. Assessment:     Patient with marked cerebral edema most likely related to anoxic encephalopathy status postcardiac arrest for significant period of time.   She reportedly arrested 30 minutes prior to arrival and only had pulses for approximately 10 minutes per EMS/ED report until ROSC was achieved in the emergency department    History of drug abuse with urine drug screen testing positive for cocaine, cannabinoids as well as fentanyl    On neuro exam at this time I find no cranial nerve or cerebral brain function now being off sedation for 48 hours    Plan:     I appreciate no brainstem or cortical function at this time would recommend brain death criteria    ANY Castillo - CNS  10:24 AM  5/16/2022

## 2022-05-16 NOTE — CARE COORDINATION
5/16:  Pt presented to the ER for cardiac arrest.  Pt was reportedly in asystole for 15 minutes. PMH substance abuse. UDS + cocaine, fentanyl & cannabis. Ct Head showed diffuse cerebral edema concerning for hypoxic brain injury. Neurology following. Pt was intubated & transferred to [facility]. Pt remains intubated on Iv Fluids, Iv thiamine, Iv Keppra, Iv Protonix & Iv Zosynl. Needs are unclear. Pt's code status changed to [facility]. Pt is in ISO/MRSA- contact. MRI on 5/16 Cerebellar tonsillar herniation. Palliative following & talking with family. Sw/CM will continue to follow.  Electronically signed by Ruddy Foster RN on 5/16/2022 at 2:42 PM

## 2022-05-16 NOTE — PROGRESS NOTES
Palliative Care Department  [phone]  Palliative Care Initial Consult  Provider ANY Grossman CNP    Hetal Mcleod  [id number]  Hospital Day: 5  Date of Initial Consult: 5/14/2022  Referring Provider: Dr. Antwon Mojica was consulted for assistance with: Goals of care, CODE STATUS discussion and family support    HPI:   Hetal Mcleod is a 40 y.o. with a medical history of polysubstance abuse (cannabis, opioids and fentanyl) who was admitted on 5/12/2022 from home with a CHIEF COMPLAINT of cardiac arrest. Patient found by EMS unresponsive. At that time patient was in asystole. ROSC was achieved in 15 minutes however they lost pulse again after few minutes. Patient brought to ED where CPR was continued. Unclear how many rounds of epi the patient received. Patient was subsequently intubated and admitted to ICU. CT head showed diffuse cerebral edema concerning for hypoxic brain injury. UDS positive for cocaine, fentanyl and cannabis. ASSESSMENT/PLAN:     Pertinent Hospital Diagnoses      Cardiac arrest likely 2/2 respiratory arrest after drug overdose   Acute metabolic encephalopathy likely 2/2 above    Palliative Care Encounter / Counseling Regarding Goals of Care  Please see detailed goals of care discussion as below   At this time, Hetal Mcleod, Does Not have capacity for medical decision-making. Capacity is time limited and situation/question specific   During encounter Crystal was surrogate medical decision-maker   Outcome of goals of care meeting:   - Continue current medical treatment   Code status Full Code   Advanced Directives: no POA or living will [facility]   Surrogate/Legal NOK:  deysi Mercedes [phone]      Spiritual assessment: no spiritual distress identified  Bereavement and grief: to be determined  Referrals to: none today    SUBJECTIVE:   Details of Conversation:    Chart reviewed. Patient seen intubated in ICU. Not no sedation.   Unresponsive to verbal or painful stimuli. Spoke with patient's cousin, [address] and significant other, Shay on the phone. Family updated on patient's current condition. Discussion regarding brain death and terminal extubation. Edwina and Shay tearful on the phone. Stating they need to talk to patient's children. Emotional support given and all questions addressed. Both parties have my number in case they have any additional questions or concerns. Will follow for continued discussion regarding CODE STATUS and goals of care as well as support for the patient and families. OBJECTIVE:   Prognosis: Poor    Physical Exam:  BP (!) 86/54   Pulse 89   Temp 98.8 °F (37.1 °C) (Esophageal)   Resp 20   Ht 5' 9\" (1.753 m)   Wt 167 lb (75.8 kg)   SpO2 100%   BMI 24.66 kg/m²   Constitutional: Intubated, unresponsive  Eyes: no scleral icterus, normal lids, no discharge  ENMT:  Normocephalic, atraumatic, mucosa moist, EOMI  Neck:  trachea midline, no JVD  Lungs:  CTA bilaterally, no audible rhonchi or wheezes noted, respirations unlabored, no retractions  Heart[de-identified]  RRR, distant heart tones, no murmur, rub, or gallop noted during exam  Abd:  Soft, non tender, non distended, bowel sounds present  Ext: Unresponsive  Skin:  Cool and dry, no rashes on visible skin  Psych: non-anxious affect  Neuro: Unresponsive    Objective data reviewed: labs, images, records, medication use, vitals and chart    Discussed patient and the plan of care with the other IDT members: Palliative Medicine IDT Team    Time/Communication  Greater than 50% of time spent, total 15 minutes in counseling and coordination of care at the bedside regarding goals of care. Thank you for allowing Palliative Medicine to participate in the care of Anthony Goncalves.

## 2022-05-16 NOTE — CONSULTS
[facility] Nephrology Consult       Patient's Name:  Melissa Payton  Date of Service:  2022  Requesting    No primary care provider on file. Reason for Consult:             Hypernatremia     Chief Complaint:                   Cardiac arrest     Information obtained from: Chart and staff     History of Present Illness: 40 yrs old WF     Found at home Comatose , EMS arrival no pulse , asystole , CPR given   In er lost pulses , CPR again , intubated , had evident severe anoxic brain injury   By History and CT/MRI  brain findings , Cerebellar tonsillar herniation . Toxic urine screen positive for - cannabinoid , cocaine and fentanyl     Renal consult for hypernatremia , which started since , progressed and worse   Post mannitol , received DDAVP     Patient remains comatose , vent dependent , on D5W , keppra , and empiric AB         No past medical history on file. Past Surgical History:   Procedure Laterality Date     SECTION      TUBAL LIGATION           Social History     Socioeconomic History    Marital status: Single     Spouse name: Not on file    Number of children: Not on file    Years of education: Not on file    Highest education level: Not on file   Occupational History    Not on file   Tobacco Use    Smoking status: Current Every Day Smoker     Packs/day: 1.00     Types: Cigarettes    Smokeless tobacco: Never Used   Vaping Use    Vaping Use: Never used   Substance and Sexual Activity    Alcohol use: No    Drug use: No    Sexual activity: Not on file   Other Topics Concern    Not on file   Social History Narrative    Not on file     Social Determinants of Health     Financial Resource Strain:     Difficulty of Paying Living Expenses: Not on file   Food Insecurity:     Worried About Running Out of Food in the Last Year: Not on file    [place] of Food in the Last Year: Not on file   Transportation Needs:     Lack of Transportation (Medical):  Not on file    Lack of Transportation (Non-Medical): Not on file   Physical Activity:     Days of Exercise per Week: Not on file    Minutes of Exercise per Session: Not on file   Stress:     Feeling of Stress : Not on file   Social Connections:     Frequency of Communication with Friends and Family: Not on file    Frequency of Social Gatherings with Friends and Family: Not on file    Attends Protestant Services: Not on file    Active Member of [address] [place] [address] or Organizations: Not on file    Attends Club or Organization Meetings: Not on file    Marital Status: Not on file   Intimate Partner Violence:     Fear of Current or Ex-Partner: Not on file    Emotionally Abused: Not on file    Physically Abused: Not on file    Sexually Abused: Not on file   Housing Stability:     Unable to Pay for Housing in the Last Year: Not on file    Number of [place] in the Last Year: Not on file    Unstable Housing in the Last Year: Not on file       Family History  No family history on file. Scheduled Meds:   desmopressin PF  1 mcg IntraVENous BID    potassium chloride  40 mEq IntraVENous Once    mupirocin   Nasal BID    sodium chloride flush  5-40 mL IntraVENous 2 times per day    chlorhexidine  15 mL Mouth/Throat BID    piperacillin-tazobactam  4,500 mg IntraVENous Q8H    pantoprazole  40 mg IntraVENous BID    levetiracetam  500 mg IntraVENous Q12H    vancomycin  1,500 mg IntraVENous Q24H       Continuous Infusions:  [unfilled]    PRN meds  sodium chloride flush, sodium chloride, polyethylene glycol, acetaminophen **OR** acetaminophen, glucose, glucagon (rDNA), dextrose, dextrose, fentanNYL, hydrALAZINE, perflutren lipid microspheres    Allergies:  Patient has no known allergies. Review of Systems     Pertinent positives and negatives as in HPI. Other systems reviewed and were negative.      LAST 3 CMP  Recent Labs     05/14/22  0500 05/14/22  0700 05/15/22  0409 05/15/22  1600 05/15/22  0929 05/15/22  1216 05/15/22  2130 05/15/22  2325 05/16/22  0052 05/16/22  0305 05/16/22  0454 05/16/22  0643   *   < > 151*   < > 157*  155*   < > 162*   < > 169* 166*  --  161*   K 3.8   < > 3.8   < > 3.9   < > 3.5  --  3.4*  --   --  3.2*   *   < > 119*   < > 123*   < > 129*  --  135*  --   --  126*   CO2 24   < > 23   < > 24   < > 26  --  25  --   --  29   BUN 12   < > 20   < > 19   < > 13  --  13  --   --  12   CREATININE 1.2*   < > 1.2*   < > 1.1*   < > 1.3*  --  1.3*  --   --  1.3*   GLUCOSE 138*   < > 157*   < > 169*   < > 162*  --  211*  --   --  177*   CALCIUM 8.8   < > 9.0   < > 9.1   < > 9.7  --  9.8  --   --  9.3   MG 2.2  --  2.5  --   --   --   --   --   --   --  2.5  --    PHOS 3.4   < > 1.4*  --  2.0*  --   --   --   --   --  2.2*  --     < > = values in this interval not displayed. LAST 3 CBC:  Recent Labs     05/14/22  0500 05/14/22  0500 05/14/22  2002 05/15/22  0409 05/16/22 0454   WBC 22.5*  --   --  21.0* 17.3*   RBC 4.24  --   --  3.58 3.47*   HGB 12.8   < > 10.9* 10.8* 10.5*   HCT 38.7   < > 33.9* 34.5 33.8*     --   --  148 135    < > = values in this interval not displayed.          Intake/Output Summary (Last 24 hours) at 5/16/2022 0937  Last data filed at 5/16/2022 0600  Gross per 24 hour   Intake 2353.08 ml   Output 4030 ml   Net -1676.92 ml     Patient Vitals for the past 24 hrs:   BP Temp Temp src Pulse Resp SpO2   05/16/22 0922 -- -- -- 88 20 100 %   05/16/22 0830 101/61 -- -- 87 20 100 %   05/16/22 0815 107/62 -- -- 89 20 100 %   05/16/22 0800 100/65 98.6 °F (37 °C) Esophageal 89 20 100 %   05/16/22 0745 105/70 -- -- 90 20 100 %   05/16/22 0730 (!) 91/52 -- -- 90 20 100 %   05/16/22 0715 106/67 -- -- 89 20 100 %   05/16/22 0700 (!) 92/56 -- -- 90 20 99 %   05/16/22 0600 93/60 98.6 °F (37 °C) Esophageal 90 20 100 %   05/16/22 0500 98/61 99 °F (37.2 °C) Esophageal 90 20 100 %   05/16/22 0400 100/65 -- -- 92 20 100 %   05/16/22 0300 100/63 -- -- 93 20 100 %   05/16/22 0200 92/67 -- -- 95 20 100 %   05/16/22 0100 95/69 99.3 °F (37.4 °C) Esophageal 99 20 100 %   05/16/22 0029 -- -- -- 102 20 99 %   05/16/22 0000 96/68 99.7 °F (37.6 °C) Esophageal 103 20 98 %   05/15/22 2300 101/65 99.5 °F (37.5 °C) Esophageal 104 20 --   05/15/22 2200 95/68 -- -- 104 20 --   05/15/22 2100 109/73 -- -- 106 20 100 %   05/15/22 2023 -- -- -- 108 20 99 %   05/15/22 2000 110/77 99.7 °F (37.6 °C) Esophageal 109 20 98 %   05/15/22 1830 127/76 -- -- 109 20 96 %   05/15/22 1815 125/76 -- -- 109 20 95 %   05/15/22 1800 134/80 -- -- 109 20 96 %   05/15/22 1745 (!) 149/85 -- -- 111 20 99 %   05/15/22 1730 (!) 141/86 -- -- 118 20 93 %   05/15/22 1715 (!) 150/102 -- -- 118 20 98 %   05/15/22 1700 (!) 148/91 -- -- 118 20 94 %   05/15/22 1645 (!) 155/100 -- -- 117 20 98 %   05/15/22 1630 (!) 152/96 -- -- 117 20 97 %   05/15/22 1615 (!) 161/99 -- -- 117 20 98 %   05/15/22 1600 (!) 143/87 99 °F (37.2 °C) Esophageal 118 20 96 %   05/15/22 1549 -- -- -- 119 20 97 %   05/15/22 1545 (!) 152/98 -- -- 120 20 98 %   05/15/22 1530 (!) 158/104 -- -- 120 20 97 %   05/15/22 1515 (!) 163/104 -- -- 121 22 98 %   05/15/22 1500 (!) 183/111 -- -- 120 20 97 %   05/15/22 1435 (!) 182/116 -- -- -- -- --   05/15/22 1420 (!) 141/91 -- -- 104 20 97 %   05/15/22 1400 (!) 182/121 97.5 °F (36.4 °C) Esophageal 111 20 98 %   05/15/22 1355 -- -- -- 113 20 99 %   05/15/22 1300 125/79 -- -- 95 20 100 %   05/15/22 1219 -- -- -- 90 20 100 %   05/15/22 1200 108/69 98.6 °F (37 °C) Esophageal 89 20 100 %   05/15/22 1100 113/69 -- -- 89 20 100 %   05/15/22 1000 106/65 -- -- 87 20 99 %   05/15/22 0944 -- -- -- (!) 8 20 99 %       General appearance:  Appears stated age  Skin: color, texture, turgor normal. No rashes or lesions. Neck: supple, no masses, no JVD, No carotid bruits, No thyromegaly  Lungs: respirations , diminished air entry bases   Heart RRR. pmi not laterally displaced. No S3 or S4, no rub  Abdomen:  Soft, ND, NT. Bowel sounds present. No HSM.  No epigastric bruit, no increased Ao pulsation,  Extremities: warm to touch. No LE edema or cyanosis. No fem bruit. 2+ upstrokes and equal bilaterally. PT/Pedal 2+ equal bilat  Neuro: Absent brain stem reflexes .     Assessment/Plan    1) Hypernatremia - possible central DI , in the setting of severe anoxic brain injury , cerebellar herniation       And post mannitol , low urine osmolality suggest DI , continue IV D5W , DDAVP , u/o 4 lit       Should improve with DDAVP     2) Substance abuse     3) S/P CR arrest , prolong asystole , Severe anoxic brain injury , cerebellar herniation , absent       Brain stem reflexes , possible brain dead -- further workup to confirm same in progress     4) Mild renal injury , non oliguric , hypokalemia - replace as needed          Continue present care , issues d/w staff nurse , will follow       Thank you for allowing us to participate in the care of Thomas Wright MD  5/16/2022  9:37 AM

## 2022-05-16 NOTE — PLAN OF CARE
Problem: Discharge Planning  Goal: Discharge to home or other facility with appropriate resources  Outcome: Not Progressing     Problem: Pain  Goal: Verbalizes/displays adequate comfort level or baseline comfort level  Outcome: Not Progressing     Problem: Skin/Tissue Integrity  Goal: Absence of new skin breakdown  Description: 1. Monitor for areas of redness and/or skin breakdown  2. Assess vascular access sites hourly  3. Every 4-6 hours minimum:  Change oxygen saturation probe site  4. Every 4-6 hours:  If on nasal continuous positive airway pressure, respiratory therapy assess nares and determine need for appliance change or resting period.   Outcome: Progressing     Problem: Safety - Adult  Goal: Free from fall injury  Outcome: Progressing     Problem: Respiratory - Adult  Goal: Achieves optimal ventilation and oxygenation  5/16/2022 0218 by Charlene Elliott RN  Outcome: Not Progressing  5/15/2022 2308 by Edilma Ward RCP  Outcome: Progressing     Problem: ABCDS Injury Assessment  Goal: Absence of physical injury  Outcome: Progressing     Problem: Nutrition Deficit:  Goal: Optimize nutritional status  Outcome: Not Progressing

## 2022-05-17 NOTE — PROCEDURES
Bronchoscopy Procedure Note      Date of Procedure: 5/17/2022    Pre-op Diagnosis: transplant evaluation    Post-op Diagnosis: same    Bronchoscopist: Dianne Luis MD    Assistants - bronchoscopy nurses     Anesthesia: none    Procedure: Flexible fiberoptic bronchoscopy with washing    Estimated Blood Loss: Minimal    Complications: None    Procedure: The bronchoscope was introduced through the ETT and advanced throughout the tracheobronchial tree. The trachea was of normal caliber and the liam was normal.  There were no endobronchial lesions. There were thick clear secretions in the right and left basilar segments. A washing was obtained from the LLL and sent for bacterial, fungal, and AFB cultures. A washing was sent from the RLL and send for covid culture. The scope was withdrawn at the conclusion of the procedure.     Start time: 14:04PM  End time: 14:15PM      Dianne Luis MD   5/17/2022 2:17 PM

## 2022-05-17 NOTE — PLAN OF CARE
Family authorized organ donation on 5/16/22. Care transferred over to [address] [place] [address]. Dr. Alverto Campa aware.     Electronically signed by Blima Dubin, MD on 5/17/2022 at 2:32 PM

## 2022-05-17 NOTE — PROCEDURES
Preprocedure diagnosis: possible TV vegetation    Procedures: LUIS DANIEL    Primary procedure  Written informed consent was obtained, the LUIS DANIEL was performed under stable fasting condition. The patient was set up for monitoring of surface EKG and pulse oximetry continuously. Blood pressure was monitored and with automatic cuff measurements. Supplemental oxygen was administered. The procedure was performed under anesthesia by anesthesiology. After ensuring adequate anesthesia, LUIS DANIEL probe was intubated without difficulty. Result: No echocardiographic evidence of endocarditis. Complication: None    Patient was monitored until awake and vitals remained stable. Yanni Handy M.D.   [facility] cardiology

## 2022-05-17 NOTE — PROGRESS NOTES
Family authorized organ donation 5/16/22 (Form in chart). They have elected for Nurys's code status to be Full Code, ok with CPR if indicated for the purpose of organ donation.

## 2022-05-17 NOTE — PLAN OF CARE
Problem: Discharge Planning  Goal: Discharge to home or other facility with appropriate resources  Outcome: Not Progressing     Problem: Pain  Goal: Verbalizes/displays adequate comfort level or baseline comfort level  5/17/2022 0514 by Brooklynn Garcia RN  Outcome: Not Progressing     Problem: Skin/Tissue Integrity  Goal: Absence of new skin breakdown  Description: 1. Monitor for areas of redness and/or skin breakdown  2. Assess vascular access sites hourly  3. Every 4-6 hours minimum:  Change oxygen saturation probe site  4. Every 4-6 hours:  If on nasal continuous positive airway pressure, respiratory therapy assess nares and determine need for appliance change or resting period.   5/17/2022 0514 by Brooklynn Garcia RN  Outcome: Not Progressing     Problem: Safety - Adult  Goal: Free from fall injury  5/17/2022 0514 by Brooklynn Garcia RN  Outcome: Not Progressing     Problem: Respiratory - Adult  Goal: Achieves optimal ventilation and oxygenation  5/17/2022 0514 by Brooklynn Garcia RN  Outcome: Not Progressing

## 2022-05-17 NOTE — PROCEDURES
Arterial line placement    Procedure: Left radial arterial line placement. Indications: Continuous monitoring of blood pressure in a patient with hypotension +/- shock, on Levophed. Discussed with Attending on Call, Dr. Luci Xavier. Okay to proceed with arterial line placement. Anesthesia: None     Consent: The family members were counseled regarding the procedure, its indications, risks, potential complications and alternatives, and any questions were answered. Consent was obtained to proceed. Technique: Time Out: Immediately prior to the procedure a \"timeout\" was called to verify the correct patient and procedure. Procedure was done using strict aseptic technique. Gordon's test was performed and was normal. left radial site was cleaned with chloraprep and draped. Radial artery was identified. Radial arterial line was inserted, a good blood flow was obtained, after which guidewire was inserted all the way with no resistance. Then the canula was inserted and needle with guidewire was withdrawn. Pulsatile bright red blood flow was observed. The canula was connected to BP monitoring apparatus and a good quality waveform was noted. Then the canula was secured with 2 stay sutures of 3-0 silk after Lidocaine infiltration, following which dressing was applied. Number of sticks: 2    Number of Kits used: 2     Complications: No immediate complication. Estimated blood loss: About 1 ml. Comment: Patient tolerated the procedure well.      Modesto Durant DO PGY-2  5/16/2022 11:28 PM    Attending: Dr. Luci Xavier

## 2022-05-17 NOTE — PROGRESS NOTES
Progress Note  5/17/2022 8:01 AM  Subjective:   Admit Date: 5/12/2022  PCP: No primary care provider on file. Interval History: Patient examined remains intubated , no overall change     Diet: No diet orders on file    Data:   Scheduled Meds:   sodium chloride flush  10 mL IntraVENous 2 times per day    cefepime  2,000 mg IntraVENous Q12H    methylPREDNISolone  1,000 mg IntraVENous Q8H    albuterol  2.5 mg Nebulization Q4H    rocuronium  0.6 mg/kg IntraVENous Once     Continuous Infusions:   insulin 7.2 Units/hr (05/17/22 0700)    dextrose      0.2% sodium chloride infusion 100 mL/hr (05/17/22 0700)    norepinephrine 1 mcg/min (05/17/22 0615)    sodium chloride      sodium chloride Stopped (05/17/22 0541)     PRN Meds:perflutren lipid microspheres, glucose, glucagon (rDNA), dextrose, dextrose, perflutren lipid microspheres, sodium chloride flush, sodium chloride  I/O last 3 completed shifts: In: 8359.6 [I.V.:6440.2; IV Piggyback:1919.4]  Out: 7813 [Urine:2431; Emesis/NG output:400]  No intake/output data recorded. Intake/Output Summary (Last 24 hours) at 5/17/2022 0801  Last data filed at 5/17/2022 0700  Gross per 24 hour   Intake 6586.13 ml   Output 871 ml   Net 5715.13 ml     CBC:   Recent Labs     05/16/22  0454 05/16/22 1937 05/17/22 0219   WBC 17.3* 14.8* 15.3*   HGB 10.5* 9.1* 8.7*    125* 119*     BMP:    Recent Labs     05/16/22  2323 05/17/22 0219 05/17/22 0626   * 148* 147*   K 3.5 3.2* 3.3*   * 113* 111*   CO2 24 22 21*   BUN 10 11 12   CREATININE 1.1* 1.1* 1.0   GLUCOSE 163* 227* 269*     Hepatic:   Recent Labs     05/16/22 1937 05/17/22 0219   AST 44* 42*   ALT 50* 51*   BILITOT 0.9 1.1   ALKPHOS 92 77     Troponin: No results for input(s): TROPONINI in the last 72 hours. BNP: No results for input(s): BNP in the last 72 hours. Lipids: No results for input(s): CHOL, HDL in the last 72 hours.     Invalid input(s): LDLCALCU  ABGs: No results found for: PHART, PO2ART, JBA2KKB  INR:   Recent Labs     05/16/22  1937 05/17/22  0219   INR 1.4 1.4       -----------------------------------------------------------------  RAD: CT HEAD WO CONTRAST    Result Date: 5/12/2022  EXAMINATION: CT OF THE HEAD WITHOUT CONTRAST  5/12/2022 9:21 pm TECHNIQUE: CT of the head was performed without the administration of intravenous contrast. Automated exposure control, iterative reconstruction, and/or weight based adjustment of the mA/kV was utilized to reduce the radiation dose to as low as reasonably achievable. COMPARISON: 10/22/2018 HISTORY: ORDERING SYSTEM PROVIDED HISTORY: Evaluate intracranial abnormality TECHNOLOGIST PROVIDED HISTORY: Has a \"code stroke\" or \"stroke alert\" been called? ->No Reason for exam:->Evaluate intracranial abnormality Decision Support Exception - unselect if not a suspected or confirmed emergency medical condition->Emergency Medical Condition (MA) What reading provider will be dictating this exam?->CRC FINDINGS: BRAIN/VENTRICLES: There is diffuse cerebral and cerebellar edema with effacement of the sulci and basilar cisterns. There is also near complete effacement of the ventricles. There is loss of normal gray-white differentiation. No intracranial hemorrhage is identified. No midline shift is seen. ORBITS: The visualized portion of the orbits demonstrate no acute abnormality. SINUSES: The visualized paranasal sinuses and mastoid air cells demonstrate no acute abnormality. An endotracheal tube and an orogastric tube are noted. SOFT TISSUES/SKULL:  No acute abnormality of the visualized skull or soft tissues. Diffuse cerebral and cerebellar edema highly concerning for hypoxic-ischemic encephalopathy, particularly given patient's clinical history of cardiac arrest with prolonged resuscitation. Critical results were called by Dr. Lety Lawler to [facility] on 5/12/2022 at 21:46.      XR CHEST PORTABLE    Result Date: 5/13/2022  EXAMINATION: ONE XRAY VIEW the cardiac silhouette. CTA CHEST W CONTRAST    Result Date: 5/12/2022  EXAMINATION: CTA OF THE CHEST 5/12/2022 11:21 pm TECHNIQUE: CTA of the chest was performed after the administration of intravenous contrast.  Multiplanar reformatted images are provided for review. MIP images are provided for review. Automated exposure control, iterative reconstruction, and/or weight based adjustment of the mA/kV was utilized to reduce the radiation dose to as low as reasonably achievable. COMPARISON: None. HISTORY: ORDERING SYSTEM PROVIDED HISTORY: Arrest TECHNOLOGIST PROVIDED HISTORY: Reason for exam:->Arrest Decision Support Exception - unselect if not a suspected or confirmed emergency medical condition->Emergency Medical Condition (MA) What reading provider will be dictating this exam?->CRC FINDINGS: Pulmonary Arteries: Pulmonary arteries are adequately opacified for evaluation. No evidence of intraluminal filling defect to suggest pulmonary embolism. Main pulmonary artery is normal in caliber. Mediastinum: No evidence of mediastinal lymphadenopathy. The heart and pericardium demonstrate no acute abnormality. There is no acute abnormality of the thoracic aorta. Lungs/pleura: The lungs are without acute process. No focal consolidation or pulmonary edema. No evidence of pleural effusion or pneumothorax. The endotracheal tube tip is in the right mainstem bronchus and should be retracted 5 cm Upper Abdomen: Limited images of the upper abdomen are unremarkable. NGT is in good position. Soft Tissues/Bones: No acute bone or soft tissue abnormality. No evidence of pulmonary embolism or acute pulmonary abnormality. No evidence of aortic dissection or aneurysm.  Endotracheal tube tip is in the right mainstem bronchus and should be retracted 5 cm     XR ABDOMEN FOR NG/OG/NE TUBE PLACEMENT    Result Date: 5/12/2022  EXAMINATION: ONE SUPINE XRAY VIEW(S) OF THE ABDOMEN; ONE XRAY VIEW OF THE CHEST 5/12/2022 10:41 pm COMPARISON: None. HISTORY: ORDERING SYSTEM PROVIDED HISTORY: Confirmation of course of NG/OG/NE tube and location of tip of tube TECHNOLOGIST PROVIDED HISTORY: Reason for exam:->Confirmation of course of NG/OG/NE tube and location of tip of tube Portable? ->Yes What reading provider will be dictating this exam?->CRC FINDINGS: CHEST: Endotracheal tube extends into the right mainstem bronchus. Recommend retraction of 4-5 cm. There are ill-defined interstitial opacities bilaterally no obvious pleural effusion or pneumothorax. Cardiac silhouette is mildly prominent. Mild central pulmonary vascular congestion. Osseous and thoracic soft tissue structures demonstrate no acute findings. UPPER ABDOMEN: Enteric tube extends subdiaphragmatic with distal tip and side port projecting over the left upper quadrant. Partially imaged upper abdomen is unremarkable. 1.  Endotracheal tube extends into the right mainstem bronchus. Recommend retraction as above. 2.  Satisfactory position of the enteric tube. 3.  Faint interstitial opacities identified bilaterally. Prominence of the cardiac silhouette. Objective:   Vitals: BP (!) 111/57   Pulse 88   Temp 97.9 °F (36.6 °C)   Resp 16   Ht 5' 9\" (1.753 m)   Wt 167 lb (75.8 kg)   SpO2 92%   BMI 24.66 kg/m²   General appearance: appears stated age   Skin:  No rashes or lesions  HEENT: Head: Normocephalic, no lesions, without obvious abnormality.   Neck: no adenopathy, no carotid bruit, no JVD, supple, symmetrical, trachea midline and thyroid not enlarged, symmetric, no tenderness/mass/nodules  Lungs: diminished breath sounds bibasilar  Heart: regular rate and rhythm, S1, S2 normal, no murmur, click, rub or gallop  Abdomen: soft, non-tender; bowel sounds normal; no masses,  no organomegaly  Extremities: edema ++  Neurologic: Mental status: comatose     Assessment:   Patient Active Problem List:     Cardiac arrest ([address]     Polysubstance abuse ([address]     Cocaine abuse ([address] Fentanyl poisoning of undetermined intent ([facility]     KYA (acute kidney injury) ([facility] [address] [facility]     Neutrophilic leukocytosis     High anion gap metabolic acidosis     Hypokalemia     Transaminitis    Plan:     Assessment/Plan     1) Hypernatremia - possible central DI , in the setting of severe anoxic brain injury , cerebellar herniation       And post mannitol , low urine osmolality suggest DI , continue IV D5W , DDAVP , u/o 4 lit       Should improve with DDAVP      2) Substance abuse      3) S/P CR arrest , prolong asystole , Severe anoxic brain injury , cerebellar herniation , absent       Brain stem reflexes , possible brain dead -- Neurology comment s noted      4) Mild renal injury , non oliguric , hypokalemia - replace as needed        KYA resolving , hypernatremia better on current fluids , low k being replaced , corrected calcium for albumin 9.2 , lactic acidosis , urine c/s E coli , Blood c/s staph coag negative , MRSA Nasal   For organ donation  ([facility])          Thank you for allowing us to participate in the care of Mark Jimenez MD

## 2022-05-17 NOTE — PROGRESS NOTES
[facility]  Internal Medicine Residency Program  Progress Note - House Team 1    Patient:  Anthony Goncalves 40 y.o. female MRN: [id number]     Date of Service: 5/17/2022     CC: cardiac arrest  Overnight events: Patient was declared brain dead yesterday at 1600. [facility] on board for organ donation. Subjective     Patient is seen and examined this morning. Intubated, ventilated, stable vital signs, on 1 mcg/min of Levophed. No response to pain, no gag or cough reflex. Objective     Physical Exam:  · Vitals: BP (!) 111/57   Pulse 91   Temp 99 °F (37.2 °C) (Esophageal)   Resp 16   Ht 5' 9\" (1.753 m)   Wt 167 lb (75.8 kg)   SpO2 99%   BMI 24.66 kg/m²     · I & O - 24hr: I/O this shift:  · In: 7.2 [I.V.:7.2]  · Out: 465 [Urine:465]   · General Appearance: intubated, ventilated  · HEENT:  Head: Normal, normocephalic, atraumatic. · Neck: no adenopathy, no carotid bruit, no JVD, supple, symmetrical, trachea midline and thyroid not enlarged, symmetric, no tenderness/mass/nodules  · Lung: clear to auscultation bilaterally  · Heart: regular rate and rhythm, S1, S2 normal, no murmur, click, rub or gallop  · Abdomen: soft, non-tender; bowel sounds normal; no masses,  no organomegaly  · Extremities:  extremities normal, atraumatic, no cyanosis or edema  · Musculokeletal: No joint swelling, no muscle tenderness. ROM normal in all joints of extremities.    · Neurologic: Mental status:intubated, ventilated  Subject  Pertinent Labs & Imaging Studies   isaias  CBC with Differential:    Lab Results   Component Value Date    WBC 15.3 05/17/2022    RBC 2.93 05/17/2022    HGB 8.7 05/17/2022    HCT 27.7 05/17/2022     05/17/2022    MCV 94.5 05/17/2022    MCH 29.7 05/17/2022    MCHC 31.4 05/17/2022    RDW 15.0 05/17/2022    METASPCT 0.9 05/12/2022    LYMPHOPCT 2.9 05/17/2022    MONOPCT 2.0 05/17/2022    BASOPCT 0.1 05/17/2022    MONOSABS 0.30 05/17/2022    LYMPHSABS 0.44 05/17/2022    EOSABS 0.01 05/17/2022    BASOSABS 0.02 05/17/2022     CMP:    Lab Results   Component Value Date     05/17/2022    K 3.3 05/17/2022     05/17/2022    CO2 21 05/17/2022    BUN 12 05/17/2022    CREATININE 1.0 05/17/2022    GFRAA >60 05/17/2022    LABGLOM >60 05/17/2022    GLUCOSE 269 05/17/2022    PROT 5.4 05/17/2022    LABALBU 2.9 05/17/2022    CALCIUM 8.2 05/17/2022    BILITOT 1.1 05/17/2022    ALKPHOS 77 05/17/2022    AST 42 05/17/2022    ALT 51 05/17/2022     Ionized Calcium:  No results found for: IONCA  Magnesium:    Lab Results   Component Value Date    MG 2.1 05/17/2022     Phosphorus:    Lab Results   Component Value Date    PHOS 5.2 05/17/2022     PT/INR:    Lab Results   Component Value Date    PROTIME 15.5 05/17/2022    INR 1.4 05/17/2022     PTT:    Lab Results   Component Value Date    APTT 27.6 05/17/2022   [APTT}  Troponin:  No results found for: TROPONINI  ABG:    Lab Results   Component Value Date    PH 7.295 05/17/2022    PCO2 48.1 05/17/2022    PO2 150.6 05/17/2022    HCO3 22.9 05/17/2022    BE -3.6 05/17/2022    O2SAT 98.7 05/17/2022       Resident's Assessment and Plan     Assessment:  1. Brain death pronouncement 5/16/22, family authorized for organ donation  2. Anoxic brain injury with cerebellar tonsillar herniation s/p arrest  3. s/p Cardiac arrest 2/2 drug overdose  4. Possible aspiration pneumonia. On cefepime  5. 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 E. Coli UTI. On cefepime  7. KYA, non oliguric  8. HAGMA 2/2 lactic acidosis, resolved  9.  Hx polysubstance abuse    Plan:  -Dontrell Crowe on board, for organ donation  -continue D5W, DDAVP per Nephro  -replace electrolytes as needed  -keep normotensive, normothermic, normoglycemic      PT/OT evaluation:  DVT prophylaxis/ GI prophylaxis:   Disposition: Migel Healy MD MD / DO, PGY-1  Attending physician: Dr. Carissa Lewis

## 2022-05-17 NOTE — PROGRESS NOTES
[facility]  Internal Medicine Residency / [address]    Attending Physician Statement  I have discussed the case, including pertinent history and exam findings with the resident and the team.  I have seen and examined the patient, reviewed meds and pertinent labs and the key elements of the encounter have been performed by me. I agree with the assessment, plan and orders as documented by the resident. Patient with declared brain death, and family authorized organ donation. Remainder of medical problems as per resident note.       Lorenzo Britton DO  Internal Medicine Residency Faculty

## 2022-05-17 NOTE — CARE COORDINATION
5/17:  Update CM Note:  Pt presented to the ER for cardiac arrest.  Pt was reportedly in asystole for 15 minutes.  PMH substance abuse.  UDS + cocaine, fentanyl & cannabis.  Ct Head showed diffuse cerebral edema concerning for hypoxic brain injury. Neurology following. Pt was intubated & transferred to [facility].  Pt remains intubated on 2% NS, Iv Insulin gtt, Iv Fluids, Iv Solumedrol, IV Maxipime, v thiamine, Iv Keppra, Iv Protonix & Iv Zosyn.  Needs are unclear.  Pt's code status changed to [facility]. Pt is in ISO/MRSA- contact. MRI on 5/16 Cerebellar tonsillar herniation. Palliative following & talking with family. LB following. Sw/CM will continue to follow.   Electronically signed by Don Coyne RN on 5/17/2022 at 4:39 PM

## 2022-05-18 NOTE — PLAN OF CARE
Problem: Discharge Planning  Goal: Discharge to home or other facility with appropriate resources  Outcome: Not Progressing  Flowsheets (Taken 5/17/2022 2000)  Discharge to home or other facility with appropriate resources: Identify barriers to discharge with patient and caregiver     Problem: Pain  Goal: Verbalizes/displays adequate comfort level or baseline comfort level  Outcome: Not Progressing  Flowsheets (Taken 5/17/2022 2000)  Verbalizes/displays adequate comfort level or baseline comfort level: Encourage patient to monitor pain and request assistance     Problem: Skin/Tissue Integrity  Goal: Absence of new skin breakdown  Description: 1. Monitor for areas of redness and/or skin breakdown  2. Assess vascular access sites hourly  3. Every 4-6 hours minimum:  Change oxygen saturation probe site  4. Every 4-6 hours:  If on nasal continuous positive airway pressure, respiratory therapy assess nares and determine need for appliance change or resting period.   Outcome: Not Progressing     Problem: Safety - Adult  Goal: Free from fall injury  Outcome: Not Progressing  Flowsheets (Taken 5/17/2022 2000)  Free From Fall Injury: Instruct family/caregiver on patient safety     Problem: Respiratory - Adult  Goal: Achieves optimal ventilation and oxygenation  Outcome: Not Progressing  Flowsheets (Taken 5/17/2022 2000)  Achieves optimal ventilation and oxygenation: Assess for changes in respiratory status     Problem: ABCDS Injury Assessment  Goal: Absence of physical injury  Outcome: Not Progressing  Flowsheets (Taken 5/17/2022 2000)  Absence of Physical Injury: Implement safety measures based on patient assessment     Problem: Nutrition Deficit:  Goal: Optimize nutritional status  Outcome: Not Progressing

## 2022-05-18 NOTE — CARE COORDINATION
5/18:  Update CM Note:  Pt presented to the ER for cardiac arrest.  Pt was reportedly in asystole for 15 minutes.  PMH substance abuse.  UDS + cocaine, fentanyl & cannabis.  Ct Head showed diffuse cerebral edema concerning for hypoxic brain injury. Neurology following. Pt is pending OR for [facility]. Pt is in ISO/MRSA- contact. Parveen Expose 5/16 Cerebellar tonsillar herniation.  Palliative following & talking with family. Sw/CM will continue to follow.  Electronically signed by Goran Aguayo RN on 5/18/2022 at 2:36 PM

## 2022-05-18 NOTE — PROGRESS NOTES
[facility]  Internal Medicine Residency / [address]    Attending Physician Statement  I have discussed the case, including pertinent history and exam findings with the resident and the team.  I have seen and examined the patient, reviewed meds and pertinent labs and the key elements of the encounter have been performed by me. I agree with the assessment, plan and orders as documented by the resident. Continue management per organ donation team.       Remainder of medical problems as per resident note.       Felicita Ramirez DO  Internal Medicine Residency Faculty

## 2022-05-18 NOTE — PLAN OF CARE
Problem: Discharge Planning  Goal: Discharge to home or other facility with appropriate resources  5/18/2022 0839 by Julissa Sales RN  Outcome: Not Progressing  5/17/2022 2050 by Katelyn Cueto RN  Outcome: Not Progressing  Flowsheets (Taken 5/17/2022 2000)  Discharge to home or other facility with appropriate resources: Identify barriers to discharge with patient and caregiver     Problem: Pain  Goal: Verbalizes/displays adequate comfort level or baseline comfort level  5/18/2022 0839 by Julissa Sales RN  Outcome: Not Progressing  5/17/2022 2050 by Katelyn Cueto RN  Outcome: Not Progressing  Flowsheets (Taken 5/17/2022 2000)  Verbalizes/displays adequate comfort level or baseline comfort level: Encourage patient to monitor pain and request assistance     Problem: Nutrition Deficit:  Goal: Optimize nutritional status  5/17/2022 2050 by Katelyn Cueto, RN  Outcome: Not Progressing

## 2022-05-18 NOTE — PROGRESS NOTES
Progress Note  5/18/2022 9:56 AM  Subjective:   Admit Date: 5/12/2022  PCP: No primary care provider on file. Interval History: Patient remains the same , on vent     Diet: No diet orders on file    Data:   Scheduled Meds:   sodium chloride flush  10 mL IntraVENous 2 times per day    cefepime  2,000 mg IntraVENous Q12H    methylPREDNISolone  1,000 mg IntraVENous Q8H    albuterol  2.5 mg Nebulization Q4H     Continuous Infusions:   insulin 5.4 Units/hr (05/18/22 0700)    dextrose      0.2% sodium chloride infusion 100 mL/hr (05/18/22 0700)    vasopressin (Septic Shock) infusion 0.004 Units/min (05/17/22 1814)    norepinephrine 1 mcg/min (05/18/22 0618)    sodium chloride      sodium chloride Stopped (05/17/22 0541)     PRN Meds:perflutren lipid microspheres, glucose, glucagon (rDNA), dextrose, dextrose, perflutren lipid microspheres, sodium chloride flush, sodium chloride  I/O last 3 completed shifts: In: 7906.2 [I.V.:6167.8; IV Piggyback:1738.4]  Out: 5050 [ZCLBD:5508; Emesis/NG output:400]  I/O this shift:  In: -   Out: 40 [Urine:40]    Intake/Output Summary (Last 24 hours) at 5/18/2022 0956  Last data filed at 5/18/2022 0837  Gross per 24 hour   Intake 3477.6 ml   Output 3532 ml   Net -54.4 ml     CBC:   Recent Labs     05/17/22  1100 05/17/22  1858 05/18/22  0306   WBC 17.1* 20.9* 23.3*   HGB 8.4* 8.7* 8.1*   * 127* 139     BMP:    Recent Labs     05/17/22  2320 05/18/22  0306 05/18/22  0604   * 149* 154*   K 3.3* 3.5 3.3*   * 117* 119*   CO2 23 22 22   BUN 13 14 14   CREATININE 0.9 1.0 1.0   GLUCOSE 151* 168* 166*     Hepatic:   Recent Labs     05/17/22  1100 05/17/22  1858 05/18/22  0306   AST 30 23 17   ALT 52* 49* 41*   BILITOT 0.5 0.5 0.5   ALKPHOS 82 88 79     Troponin: No results for input(s): TROPONINI in the last 72 hours. BNP: No results for input(s): BNP in the last 72 hours. Lipids: No results for input(s): CHOL, HDL in the last 72 hours.     Invalid input(s): LDLCALCU  ABGs: No results found for: PHART, PO2ART, IFX6QFA  INR:   Recent Labs     05/17/22  1100 05/17/22  1858 05/18/22  0306   INR 1.4 1.4 1.4       -----------------------------------------------------------------  RAD: CT HEAD WO CONTRAST    Result Date: 5/12/2022  EXAMINATION: CT OF THE HEAD WITHOUT CONTRAST  5/12/2022 9:21 pm TECHNIQUE: CT of the head was performed without the administration of intravenous contrast. Automated exposure control, iterative reconstruction, and/or weight based adjustment of the mA/kV was utilized to reduce the radiation dose to as low as reasonably achievable. COMPARISON: 10/22/2018 HISTORY: ORDERING SYSTEM PROVIDED HISTORY: Evaluate intracranial abnormality TECHNOLOGIST PROVIDED HISTORY: Has a \"code stroke\" or \"stroke alert\" been called? ->No Reason for exam:->Evaluate intracranial abnormality Decision Support Exception - unselect if not a suspected or confirmed emergency medical condition->Emergency Medical Condition (MA) What reading provider will be dictating this exam?->CRC FINDINGS: BRAIN/VENTRICLES: There is diffuse cerebral and cerebellar edema with effacement of the sulci and basilar cisterns. There is also near complete effacement of the ventricles. There is loss of normal gray-white differentiation. No intracranial hemorrhage is identified. No midline shift is seen. ORBITS: The visualized portion of the orbits demonstrate no acute abnormality. SINUSES: The visualized paranasal sinuses and mastoid air cells demonstrate no acute abnormality. An endotracheal tube and an orogastric tube are noted. SOFT TISSUES/SKULL:  No acute abnormality of the visualized skull or soft tissues. Diffuse cerebral and cerebellar edema highly concerning for hypoxic-ischemic encephalopathy, particularly given patient's clinical history of cardiac arrest with prolonged resuscitation. Critical results were called by Dr. Nithin Flowers to [facility] on 5/12/2022 at 21:46.      XR CHEST PORTABLE    Result Date: 5/13/2022  EXAMINATION: ONE XRAY VIEW OF THE CHEST 5/13/2022 11:32 am COMPARISON: None. HISTORY: ORDERING SYSTEM PROVIDED HISTORY: et tube placement TECHNOLOGIST PROVIDED HISTORY: Reason for exam:->et tube placement What reading provider will be dictating this exam?->CRC FINDINGS: Single AP semi upright chest demonstrates lungs to be clear and there is no evidence of a pneumothorax. There is a right-sided subclavian catheter in position. The endotracheal tube tip is in good position approximately 4 cm above the liam. The orogastric tube is in satisfactory position approximately 10 cm below the EG junction in the body of the stomach. The cardiac silhouette is unremarkable. Satisfactory line placements. No pneumothorax and no acute disease. XR CHEST PORTABLE    Result Date: 5/12/2022  EXAMINATION: ONE SUPINE XRAY VIEW(S) OF THE ABDOMEN; ONE XRAY VIEW OF THE CHEST 5/12/2022 10:41 pm COMPARISON: None. HISTORY: ORDERING SYSTEM PROVIDED HISTORY: Confirmation of course of NG/OG/NE tube and location of tip of tube TECHNOLOGIST PROVIDED HISTORY: Reason for exam:->Confirmation of course of NG/OG/NE tube and location of tip of tube Portable? ->Yes What reading provider will be dictating this exam?->CRC FINDINGS: CHEST: Endotracheal tube extends into the right mainstem bronchus. Recommend retraction of 4-5 cm. There are ill-defined interstitial opacities bilaterally no obvious pleural effusion or pneumothorax. Cardiac silhouette is mildly prominent. Mild central pulmonary vascular congestion. Osseous and thoracic soft tissue structures demonstrate no acute findings. UPPER ABDOMEN: Enteric tube extends subdiaphragmatic with distal tip and side port projecting over the left upper quadrant. Partially imaged upper abdomen is unremarkable. 1.  Endotracheal tube extends into the right mainstem bronchus. Recommend retraction as above. 2.  Satisfactory position of the enteric tube.  3.  Faint interstitial opacities identified bilaterally. Prominence of the cardiac silhouette. CTA CHEST W CONTRAST    Result Date: 5/12/2022  EXAMINATION: CTA OF THE CHEST 5/12/2022 11:21 pm TECHNIQUE: CTA of the chest was performed after the administration of intravenous contrast.  Multiplanar reformatted images are provided for review. MIP images are provided for review. Automated exposure control, iterative reconstruction, and/or weight based adjustment of the mA/kV was utilized to reduce the radiation dose to as low as reasonably achievable. COMPARISON: None. HISTORY: ORDERING SYSTEM PROVIDED HISTORY: Arrest TECHNOLOGIST PROVIDED HISTORY: Reason for exam:->Arrest Decision Support Exception - unselect if not a suspected or confirmed emergency medical condition->Emergency Medical Condition (MA) What reading provider will be dictating this exam?->CRC FINDINGS: Pulmonary Arteries: Pulmonary arteries are adequately opacified for evaluation. No evidence of intraluminal filling defect to suggest pulmonary embolism. Main pulmonary artery is normal in caliber. Mediastinum: No evidence of mediastinal lymphadenopathy. The heart and pericardium demonstrate no acute abnormality. There is no acute abnormality of the thoracic aorta. Lungs/pleura: The lungs are without acute process. No focal consolidation or pulmonary edema. No evidence of pleural effusion or pneumothorax. The endotracheal tube tip is in the right mainstem bronchus and should be retracted 5 cm Upper Abdomen: Limited images of the upper abdomen are unremarkable. NGT is in good position. Soft Tissues/Bones: No acute bone or soft tissue abnormality. No evidence of pulmonary embolism or acute pulmonary abnormality. No evidence of aortic dissection or aneurysm.  Endotracheal tube tip is in the right mainstem bronchus and should be retracted 5 cm     XR ABDOMEN FOR NG/OG/NE TUBE PLACEMENT    Result Date: 5/12/2022  EXAMINATION: ONE SUPINE XRAY VIEW(S) OF THE ABDOMEN; ONE XRAY VIEW OF THE CHEST 5/12/2022 10:41 pm COMPARISON: None. HISTORY: ORDERING SYSTEM PROVIDED HISTORY: Confirmation of course of NG/OG/NE tube and location of tip of tube TECHNOLOGIST PROVIDED HISTORY: Reason for exam:->Confirmation of course of NG/OG/NE tube and location of tip of tube Portable? ->Yes What reading provider will be dictating this exam?->CRC FINDINGS: CHEST: Endotracheal tube extends into the right mainstem bronchus. Recommend retraction of 4-5 cm. There are ill-defined interstitial opacities bilaterally no obvious pleural effusion or pneumothorax. Cardiac silhouette is mildly prominent. Mild central pulmonary vascular congestion. Osseous and thoracic soft tissue structures demonstrate no acute findings. UPPER ABDOMEN: Enteric tube extends subdiaphragmatic with distal tip and side port projecting over the left upper quadrant. Partially imaged upper abdomen is unremarkable. 1.  Endotracheal tube extends into the right mainstem bronchus. Recommend retraction as above. 2.  Satisfactory position of the enteric tube. 3.  Faint interstitial opacities identified bilaterally. Prominence of the cardiac silhouette.        Objective:   Vitals: BP (!) 108/52   Pulse 93   Temp 98 °F (36.7 °C)   Resp 12   Ht 5' 9\" (1.753 m)   Wt 167 lb (75.8 kg)   SpO2 100%   BMI 24.66 kg/m²   General appearance: appears stated age   Skin:  No rashes or lesions  HEENT: absent brain stem reflexes   Neck: no adenopathy, no carotid bruit, no JVD, supple, symmetrical, trachea midline and thyroid not enlarged, symmetric, no tenderness/mass/nodules  Lungs: diminished breath sounds bibasilar  Heart: regular rate and rhythm, S1, S2 normal, no murmur, click, rub or gallop  Abdomen: soft, non-tender; bowel sounds normal; no masses,  no organomegaly  Extremities: extremities normal, atraumatic, no cyanosis or edema  Neurologic: Mental status: Declared brain dead 5/16 /22    Assessment:   Patient Active Problem List:     Cardiac arrest ([facility]     Polysubstance abuse ([facility]     Cocaine abuse ([facility]     Fentanyl poisoning of undetermined intent ([facility]     KYA (acute kidney injury) ([facility]     Neutrophilic leukocytosis     High anion gap metabolic acidosis     Hypokalemia     Transaminitis    Plan:   Assessment/Plan     1) Hypernatremia - possible central DI , in the setting of severe anoxic brain injury , cerebellar herniation       And post mannitol , low urine osmolality suggest DI , continue IVf  , u/o 3792 ,increase IVF rate            2) Substance abuse      3) S/P CR arrest , prolong asystole , Severe anoxic brain injury , cerebellar herniation , absent       Brain stem reflexes , possible brain dead -- Neurology comment s noted      4) Mild renal injury , non oliguric , hypokalemia - replace as needed        KYA resolved , hypernatremia will increase  current fluids , low k being replaced , corrected calcium for albumin 9.2 , lactic acidosis better  , urine c/s E coli , Blood c/s staph coag negative , MRSA Nasal   For organ donation  ([facility]), Brain dead ( 5/16/22)            Thank you for allowing us to participate in the care Liseth Bui MD

## 2022-05-18 NOTE — PROGRESS NOTES
Internal Medicine   Resident Progress Note    Patient seen and examined in the AM.   No change in physical exam findings from yesterday. Patient post apnea test (done on 5/16/2022 at 1600) showing irreversible cessation of function of the brain and brain stem. All current care under [facility]. Kim Gowers, MD  05/18/22  PGY-3  Internal Medicine.

## 2022-05-19 NOTE — ANESTHESIA PRE PROCEDURE
Department of Anesthesiology  Preprocedure Note       Name:  Chris Burkett   Age:  40 y.o.  :  1985                                          MRN:  [id number]         Date:  2022      Surgeon: Dallin Rondon):  Juan    Procedure: Procedure(s):  ORGAN PROCUREMENT,HEART,LUNGS,LIVER, KIDNEY    Medications prior to admission:   Prior to Admission medications    Medication Sig Start Date End Date Taking? Authorizing Provider   ibuprofen (IBU) 800 MG tablet Take 1 tablet by mouth every 8 hours as needed for Pain Take with food.  19   ANY Torres - CNP   ondansetron (ZOFRAN ODT) 4 MG disintegrating tablet Take 1 tablet by mouth every 8 hours as needed for Nausea or Vomiting 19   Ginger Rose MD       Current medications:    Current Facility-Administered Medications   Medication Dose Route Frequency Provider Last Rate Last Admin    perflutren lipid microspheres (DEFINITY) injection 1.65 mg  1.5 mL IntraVENous ONCE PRN [facility]        insulin regular (HUMULIN R;NOVOLIN R) 100 Units in sodium chloride 0.9 % 100 mL infusion  0.5 Units/hr IntraVENous Continuous [facility] 4.6 mL/hr at 22 0854 4.56 Units/hr at 22 0854    glucose chewable tablet 16 g  4 tablet Oral PRN [facility]        glucagon (rDNA) injection 1 mg  1 mg IntraMUSCular PRN [facility]        dextrose 5 % solution  100 mL/hr IntraVENous PRN [facility]        0.2% sodium chloride infusion  100 mL/hr IntraVENous Continuous [facility] 100 mL/hr at 22 0842 100 mL/hr at 22 0842    dextrose 50 % IV solution  25 g IntraVENous PRN Barby Tom.,         vasopressin 20 Units in dextrose 5 % 100 mL infusion  0.004 Units/min IntraVENous Continuous [facility] 1.8 mL/hr at 22 0842 0.006 Units/min at 22 0842    norepinephrine (LEVOPHED) 16 mg in dextrose 5% 250 mL infusion  1-100 mcg/min IntraVENous Continuous Magaly Barrett MD   Stopped at 22 1230    perflutren lipid microspheres (DEFINITY) injection 1.65 mg  1.5 mL IntraVENous ONCE PRN [facility]        sodium chloride flush 0.9 % injection 10 mL  10 mL IntraVENous 2 times per day [facility]   10 mL at 05/19/22 0810    sodium chloride flush 0.9 % injection 10 mL  10 mL IntraVENous PRN [facility]        0.9 % sodium chloride infusion   IntraVENous PRN [facility]        cefepime (MAXIPIME) 2000 mg IVPB minibag  2,000 mg IntraVENous Q12H [facility] 12.5 mL/hr at 05/19/22 0810 2,000 mg at 05/19/22 0810    methylPREDNISolone sodium (SOLU-MEDROL) 1,000 mg in sodium chloride 0.9 % 250 mL IVPB  1,000 mg IntraVENous Q8H [facility]   Stopped at 05/19/22 0535    albuterol (PROVENTIL) nebulizer solution 2.5 mg  2.5 mg Nebulization Q4H [facility]   2.5 mg at 05/19/22 0847    0.45 % sodium chloride infusion   IntraVENous Continuous [facility]   Stopped at 05/17/22 0541       Allergies:  No Known Allergies    Problem List:    Patient Active Problem List   Diagnosis Code    Cardiac arrest ([facility] I46.9    Polysubstance abuse ([facility] F19.10    Cocaine abuse ([facility] F14.10    Fentanyl poisoning of undetermined intent ([facility] T40.414A    KYA (acute kidney injury) ([facility] H14.6    Neutrophilic leukocytosis U92.4    High anion gap metabolic acidosis Q51.6    Hypokalemia E87.6    Transaminitis R74.01       Past Medical History:  No past medical history on file. Past Surgical History:        Procedure Laterality Date    BRONCHOSCOPY N/A 5/17/2022    BRONCHOSCOPY DIAGNOSTIC OR CELL [address] ONLY performed by Carmen Smith MD at [address] [place] [address] Ave         Social History:    Social History     Tobacco Use    Smoking status: Current Every Day Smoker     Packs/day: 1.00     Types: Cigarettes    Smokeless tobacco: Never Used   Substance Use Topics    Alcohol use:  No                                Ready to quit: Not Answered  Counseling given: Not Answered      Vital Signs (Current):   Vitals:    05/19/22 4025 05/19/22 0848 05/19/22 5844 05/19/22 0854   BP:       Pulse: 93  94 98   Resp: (!) 6 (!) 6 (!) 6 (!) 6   Temp:       TempSrc:       SpO2:  98% 98%    Weight:       Height:                                                  BP Readings from Last 3 Encounters:   05/19/22 (!) 150/73   04/24/21 (!) 144/91   08/16/19 (!) 142/91       NPO Status:                                                                                 ECG 5/17/22:  Normal sinus rhythm  Normal ECG  When compared with ECG of 16-MAY-2022 23:38,  Significant changes have occurred  Confirmed by [employer] [phone]) on 5/18/2022 12:17:43 PM    Echo 5/17/22: Findings      Left Ventricle   Normal left ventricle size and systolic function. Ejection fraction is visually estimated at 60-65%. No regional wall motion abnormalities seen. Normal left ventricle wall thickness. Right Ventricle   Normal right ventricular size and function. Left Atrium   Normal sized left atrium. Agitated saline injected for shunt evaluation. No evidence of patent foramen ovale on bubble study. No evidence of thrombus within left atrium or appendage. Right Atrium   Normal right atrium size. Mitral Valve   Normal mitral valve structure and function. No evidence of mitral valve stenosis. Physiologic and/or trace mitral regurgitation is present. No vegetation noted on mitral valve. Tricuspid Valve   The tricuspid valve appears structurally normal.   Physiologic and/or trace tricuspid regurgitation. RVSP is 26 mmHg. Normal estimated PA systolic pressure. No tricuspid valve vegetation or masses visualized. Aortic Valve   Structurally normal aortic valve. The aortic valve is trileaflet with good leaflet separation. No hemodynamically significant aortic stenosis is present. No evidence of aortic valve regurgitation. No evidence of mass or vegetation noted on the aortic valve.       Pulmonic Valve   Pulmonic valve is structurally normal.   Physiologic and/or trace pulmonic regurgitation present. Pericardial Effusion   No evidence for hemodynamically significant pericardial effusion. Pleural Effusion   No evidence of pleural effusion. Aorta   Normal aortic root and ascending aorta. Miscellaneous   Inferior Vena Cava not well visualized. Conclusions      Summary   Normal left ventricle size and systolic function. Ejection fraction is visually estimated at 60-65%. No regional wall motion abnormalities seen. Normal left ventricle wall thickness. No evidence of patent foramen ovale on bubble study. No evidence of thrombus within left atrium or appendage. Normal right ventricular size and function. No significant valvular abnormalities. RVSP is 26 mmHg. Normal estimated PA systolic pressure. No evidence for hemodynamically significant pericardial effusion. No echocardiographic evidence of endocarditis.       Signature      ----------------------------------------------------------------   Electronically signed by Tomi Brewster MD(Interpreting   physician) on 05/17/2022 12:45 PM   ----------------------------------------------------------------    MRI Brain 5/15/22:  FINDINGS:   INTRACRANIAL STRUCTURES/VENTRICLES: There is diffuse restricted diffusion   involving the cortex as well as the deep gray nuclei bilaterally as well as   the posterior fossa.  There is diffuse sulcal effacement as well as   effacement of the basilar cisterns.  There is cerebellar tonsillar herniation   into the upper cervical spinal canal.  There is a slight like appearance of   the ventricular system.  No midline shift.  No convincing acute intracranial   hemorrhage seen.  No gross abnormality seen of the sella.  Abnormal T2 signal   is seen within the right jacobo.       ORBITS: The visualized portion of the orbits demonstrate no acute abnormality.       SINUSES: Fluid is seen in the nasopharynx.  There is minimal scattered   mucosal thickening of the paranasal sinuses.  Trace bilateral mastoid   effusions.       BONES/SOFT TISSUES: The bone marrow signal intensity appears normal. The soft   tissues demonstrate no acute abnormality.         CXR 5/18/22:  FINDINGS:   EKG leads overlie the chest.  Support tubes and lines remain in place.  Heart   size is normal.  No pneumothorax.  Improving aeration of the right infrahilar   region.  No other significant change. CT Head 5/12/22:  FINDINGS:   BRAIN/VENTRICLES: There is diffuse cerebral and cerebellar edema with   effacement of the sulci and basilar cisterns.  There is also near complete   effacement of the ventricles.  There is loss of normal gray-white   differentiation.  No intracranial hemorrhage is identified.  No midline shift   is seen.       ORBITS: The visualized portion of the orbits demonstrate no acute abnormality.       SINUSES: The visualized paranasal sinuses and mastoid air cells demonstrate   no acute abnormality.  An endotracheal tube and an orogastric tube are noted.       SOFT TISSUES/SKULL:  No acute abnormality of the visualized skull or soft   tissues. BMI:   Wt Readings from Last 3 Encounters:   05/15/22 167 lb (75.8 kg)   04/24/21 198 lb (89.8 kg)   06/17/19 180 lb (81.6 kg)     Body mass index is 24.66 kg/m².     CBC:   Lab Results   Component Value Date    WBC 14.2 05/19/2022    RBC 4.24 05/19/2022    HGB 12.7 05/19/2022    HCT 38.5 05/19/2022    MCV 90.8 05/19/2022    RDW 14.7 05/19/2022     05/19/2022       CMP:   Lab Results   Component Value Date     05/19/2022    K 3.6 05/19/2022     05/19/2022    CO2 23 05/19/2022    BUN 24 05/19/2022    CREATININE 0.8 05/19/2022    GFRAA >60 05/19/2022    LABGLOM >60 05/19/2022    GLUCOSE 156 05/19/2022    PROT 5.7 05/19/2022    CALCIUM 9.2 05/19/2022    BILITOT 0.7 05/19/2022    ALKPHOS 92 05/19/2022    AST 12 05/19/2022    ALT 31 05/19/2022       POC Tests: No results for input(s): POCGLU, POCNA, MARCIA, POCKIANA, POCBUN, Jonah Meyer in the last 72 hours. Coags:   Lab Results   Component Value Date    PROTIME 17.3 05/19/2022    INR 1.5 05/19/2022    APTT 27.0 05/19/2022       HCG (If Applicable): No results found for: PREGTESTUR, PREGSERUM, HCG, HCGQUANT     ABGs: No results found for: PHART, PO2ART, OLC3YEC, EIN0GSI, BEART, D3YCTHDS     Type & Screen (If Applicable):  No results found for: LABABO, LABRH    Drug/Infectious Status (If Applicable):  No results found for: HIV, HEPCAB    COVID-19 Screening (If Applicable):   Lab Results   Component Value Date    COVID19 Not Detected 05/16/2022           Anesthesia Evaluation  Patient summary reviewed and Nursing notes reviewed no history of anesthetic complications:   Airway: Mallampati: Unable to assess / NA       Comment: Unable to assess-patient intubated   Dental:      Comment: Unable to assess-patient intubated    Pulmonary: breath sounds clear to auscultation  (+) current smoker          Patient did not smoke on day of surgery. Cardiovascular:          ECG reviewed  Rhythm: regular  Rate: normal  Echocardiogram reviewed         Beta Blocker:  Not on Beta Blocker      ROS comment: Cardiac arrest related to overdose     Neuro/Psych:                ROS comment: Brain death pronounced 5/16/22 GI/Hepatic/Renal:   (+) renal disease: ARF,          ROS comment: Transaminitis  . Endo/Other:    (+) electrolyte abnormalities (Hypokalemia), .                  ROS comment: Polysubstance abuse, Neutrophilic leukocytosis Abdominal:             Vascular: negative vascular ROS. Other Findings:           Anesthesia Plan      general     ASA 6         arterial line        Plan discussed with CRNA. Patient currently on the following infusions:  0.2% NaCl @100mL/hr  0.45% NaCl @ 199mL/hr  RHI @ 4.56units/hr  Norepi @ 1mcg/min  Vaso@ 0.006units/min     Sierra Castro RN   5/19/2022      [facility] case.     Wili Duffy DO  Staff Anesthesiologist  May 19, 2022  9:49 AM

## 2022-05-19 NOTE — ANESTHESIA POSTPROCEDURE EVALUATION
Department of Anesthesiology  Postprocedure Note    Patient: eDbbie Samson  MRN: [id number]  YOB: 1985  Date of evaluation: 5/19/2022  Time:  9:50 AM     Procedure Summary     Date: 05/19/22 Room / Location: [facility] OR 12 / [facility]    Anesthesia Start: 2780 Anesthesia Stop:     Procedure: [address], KIDNEY (N/A ) Diagnosis: [facility])    Surgeons: Barrie Tinsley Responsible Provider: Lorie Barrett DO    Anesthesia Type: general ASA Status: 6          Anesthesia Type: No value filed. Taryn Phase I:      Taryn Phase II:      Last vitals: Reviewed and per EMR flowsheets.        Anesthesia Post Evaluation    Comments: Organ harvest

## 2022-05-19 NOTE — CARE COORDINATION
5/19:  Update CM Note:  Pt presented to the ER for cardiac arrest.  Pt was reportedly in asystole for 15 minutes.  PMH substance abuse.  UDS + cocaine, fentanyl & cannabis.  Ct Head showed diffuse cerebral edema concerning for hypoxic brain injury. Neurology following. Pt is OR for [facility] this am.  Pt is in [facility]- contact. August Sample 5/16 Cerebellar tonsillar herniation.  Palliative following & talking with family. Sw/CM will continue to follow.   Electronically signed by Rosenda Vincent RN on 5/19/2022 at 9:34 AM

## 2022-05-20 LAB
CULTURE, RESPIRATORY: ABNORMAL
CULTURE, RESPIRATORY: ABNORMAL
ORGANISM: ABNORMAL
SMEAR, RESPIRATORY: ABNORMAL

## 2022-05-21 NOTE — DISCHARGE SUMMARY
cardiac arrest.  CXR and CTA chest unremarkable. Patient was admitted the the ICU for management. 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 From 5/16/2022 onwards, Savannah Morris took over care for the patient. Consults:   Nephrology  Palliative  Critical care  Neurology    Significant Diagnostic Studies:  As mentioned in the hospital course        Discharge Exam:  Brain Death protocol on 5/16/2022 at 1600.          Alena Pinto M.D., PGY-3    Internal medicine resident    Attending Physician: Dr. Valeria Duarte,     5/21/2022 12:38 PM

## 2022-06-20 LAB
FUNGUS (MYCOLOGY) CULTURE: NORMAL
FUNGUS STAIN: NORMAL

## 2022-07-05 LAB
AFB CULTURE (MYCOBACTERIA): NORMAL
AFB SMEAR: NORMAL

## (undated) DEVICE — PADDLE INTERN DEFIB 12 ADLT

## (undated) DEVICE — TOWEL,OR,DSP,ST,BLUE,DLX,10/PK,8PK/CS: Brand: MEDLINE

## (undated) DEVICE — YANKAUER,POOLE TIP,STERILE,50/CS: Brand: MEDLINE

## (undated) DEVICE — CLIP LIG M BLU TI HRT SHP WIRE HORZ 600 PER BX

## (undated) DEVICE — STRYKER PERFORMANCE SERIES STERNUM BLADE: Brand: STRYKER PERFORMANCE SERIES

## (undated) DEVICE — SET SURG BASIN OPEN HEART NO  1 REUSABLE

## (undated) DEVICE — DECANTER BAG 9": Brand: MEDLINE INDUSTRIES, INC.

## (undated) DEVICE — RETRACTOR BALFOUR LIFEBANC

## (undated) DEVICE — SOLUTION,WATER,IRRIGATION,500ML,STERILE: Brand: MEDLINE

## (undated) DEVICE — PAD GEN USE BORDERED ADH 14IN 2IN AND 12IN 4IN GZ UNIV ST

## (undated) DEVICE — ADAPTER TBNG DIA15MM SWVL FBROPT BRONCHSCP TERM 2 AXIS PEEP

## (undated) DEVICE — BAG PRSS INFUS 1000ML 2 PRSS SFTY VLV RIG HNGR SLIP EASILY

## (undated) DEVICE — AGENT HEMSTAT W4XL8IN OXIDIZED REGENERATED CELOS ABSRB

## (undated) DEVICE — APPLICATOR PREP 26ML 0.7% IOD POVACRYLEX 74% ISO ALC ST

## (undated) DEVICE — ALCOHOL RUBBING ISO 16OZ 70%

## (undated) DEVICE — BRONCHOSCOPY PACK: Brand: MEDLINE INDUSTRIES, INC.

## (undated) DEVICE — MAGNETIC INSTR DRAPE 20X16: Brand: MEDLINE INDUSTRIES, INC.

## (undated) DEVICE — SINGLE USE SUCTION VALVE MAJ-209: Brand: SINGLE USE SUCTION VALVE (STERILE)

## (undated) DEVICE — SOLUTION IRRIG 500ML 0.9% SOD CHL USP POUR PLAS BTL

## (undated) DEVICE — SINGLE USE BIOPSY VALVE MAJ-210: Brand: SINGLE USE BIOPSY VALVE (STERILE)

## (undated) DEVICE — COVER,TABLE,60X90,STERILE: Brand: MEDLINE

## (undated) DEVICE — GOWN,BREATHABLE SLV,AURORA,XLG,STRL: Brand: MEDLINE

## (undated) DEVICE — CLIP INT SM TI EZ LD LIG SYS WECK HORZ

## (undated) DEVICE — SYRINGE MED 10ML POLYPR LUERSLIP TIP FLAT TOP W/O SFTY DISP

## (undated) DEVICE — PENCIL ES L3M BTTN SWCH HOLSTER W/ BLDE ELECTRD EDGE

## (undated) DEVICE — WAX SURG 2.5GM HEMSTAT BNE BEESWAX PARAFFIN ISO PALMITATE

## (undated) DEVICE — Device

## (undated) DEVICE — CLIP INT M L GRN TI TRNSVRS GRV CHEVRON SHP W/ PRECIS TIP

## (undated) DEVICE — SYRINGE MED 10ML LUERLOCK TIP W/O SFTY DISP

## (undated) DEVICE — YANKAUER,OPEN TIP,W/O VENT,STERILE: Brand: MEDLINE INDUSTRIES, INC.

## (undated) DEVICE — E-Z CLEAN, NON-STICK, PTFE COATED, ELECTROSURGICAL BLADE ELECTRODE, 6.5 INCH (16.5 CM): Brand: MEGADYNE

## (undated) DEVICE — DOUBLE  SWIVEL ELBOW 15M - DOUBLE FLIP TOP CAP WITH SEAL - 22M/15F: Brand: DOUBLE  SWIVEL ELBOW 15M - DOUBLE FLIP TOP CAP WITH SEAL - 22M/15F

## (undated) DEVICE — APPLIER CLP L L13IN TI MULT RNG HNDL 20 CLP STR LIGACLP